# Patient Record
Sex: FEMALE | Race: WHITE | NOT HISPANIC OR LATINO | Employment: UNEMPLOYED | URBAN - NONMETROPOLITAN AREA
[De-identification: names, ages, dates, MRNs, and addresses within clinical notes are randomized per-mention and may not be internally consistent; named-entity substitution may affect disease eponyms.]

---

## 2018-05-24 ENCOUNTER — TRANSCRIBE ORDERS (OUTPATIENT)
Dept: ADMINISTRATIVE | Facility: HOSPITAL | Age: 73
End: 2018-05-24

## 2018-05-24 DIAGNOSIS — R80.9 PROTEINURIA, UNSPECIFIED TYPE: ICD-10-CM

## 2018-05-24 DIAGNOSIS — R06.09 DYSPNEA ON EXERTION: Primary | ICD-10-CM

## 2019-10-02 ENCOUNTER — APPOINTMENT (OUTPATIENT)
Dept: GENERAL RADIOLOGY | Facility: HOSPITAL | Age: 74
End: 2019-10-02

## 2019-10-02 ENCOUNTER — HOSPITAL ENCOUNTER (INPATIENT)
Facility: HOSPITAL | Age: 74
LOS: 9 days | Discharge: HOME | End: 2019-10-11
Attending: FAMILY MEDICINE | Admitting: INTERNAL MEDICINE

## 2019-10-02 ENCOUNTER — APPOINTMENT (OUTPATIENT)
Dept: CT IMAGING | Facility: HOSPITAL | Age: 74
End: 2019-10-02

## 2019-10-02 DIAGNOSIS — R00.1 SYMPTOMATIC BRADYCARDIA: Primary | ICD-10-CM

## 2019-10-02 DIAGNOSIS — R55 SYNCOPE, UNSPECIFIED SYNCOPE TYPE: ICD-10-CM

## 2019-10-02 DIAGNOSIS — R53.1 WEAKNESS: ICD-10-CM

## 2019-10-02 PROBLEM — E83.42 HYPOMAGNESEMIA: Status: ACTIVE | Noted: 2019-10-02

## 2019-10-02 LAB
ALBUMIN SERPL-MCNC: 4 G/DL (ref 3.5–5.2)
ALBUMIN/GLOB SERPL: 1.5 G/DL
ALP SERPL-CCNC: 59 U/L (ref 39–117)
ALT SERPL W P-5'-P-CCNC: 6 U/L (ref 1–33)
ANION GAP SERPL CALCULATED.3IONS-SCNC: 14.1 MMOL/L (ref 5–15)
AST SERPL-CCNC: 10 U/L (ref 1–32)
BASOPHILS # BLD AUTO: 0.02 10*3/MM3 (ref 0–0.2)
BASOPHILS NFR BLD AUTO: 0.2 % (ref 0–1.5)
BILIRUB SERPL-MCNC: 0.3 MG/DL (ref 0.2–1.2)
BUN BLD-MCNC: 11 MG/DL (ref 8–23)
BUN/CREAT SERPL: 13.1 (ref 7–25)
CALCIUM SPEC-SCNC: 9.5 MG/DL (ref 8.6–10.5)
CHLORIDE SERPL-SCNC: 97 MMOL/L (ref 98–107)
CO2 SERPL-SCNC: 24.9 MMOL/L (ref 22–29)
CREAT BLD-MCNC: 0.84 MG/DL (ref 0.57–1)
DEPRECATED RDW RBC AUTO: 43.1 FL (ref 37–54)
EOSINOPHIL # BLD AUTO: 0.09 10*3/MM3 (ref 0–0.4)
EOSINOPHIL NFR BLD AUTO: 1 % (ref 0.3–6.2)
ERYTHROCYTE [DISTWIDTH] IN BLOOD BY AUTOMATED COUNT: 14.1 % (ref 12.3–15.4)
GFR SERPL CREATININE-BSD FRML MDRD: 66 ML/MIN/1.73
GLOBULIN UR ELPH-MCNC: 2.7 GM/DL
GLUCOSE BLD-MCNC: 138 MG/DL (ref 65–99)
GLUCOSE BLDC GLUCOMTR-MCNC: 132 MG/DL (ref 70–130)
HBA1C MFR BLD: 5.9 % (ref 4.8–5.6)
HCT VFR BLD AUTO: 38.1 % (ref 34–46.6)
HGB BLD-MCNC: 12.2 G/DL (ref 12–15.9)
HOLD SPECIMEN: NORMAL
HOLD SPECIMEN: NORMAL
IMM GRANULOCYTES # BLD AUTO: 0.01 10*3/MM3 (ref 0–0.05)
IMM GRANULOCYTES NFR BLD AUTO: 0.1 % (ref 0–0.5)
LYMPHOCYTES # BLD AUTO: 2.58 10*3/MM3 (ref 0.7–3.1)
LYMPHOCYTES NFR BLD AUTO: 27.3 % (ref 19.6–45.3)
MAGNESIUM SERPL-MCNC: 1.4 MG/DL (ref 1.6–2.4)
MCH RBC QN AUTO: 27.2 PG (ref 26.6–33)
MCHC RBC AUTO-ENTMCNC: 32 G/DL (ref 31.5–35.7)
MCV RBC AUTO: 84.9 FL (ref 79–97)
MONOCYTES # BLD AUTO: 0.63 10*3/MM3 (ref 0.1–0.9)
MONOCYTES NFR BLD AUTO: 6.7 % (ref 5–12)
NEUTROPHILS # BLD AUTO: 6.13 10*3/MM3 (ref 1.7–7)
NEUTROPHILS NFR BLD AUTO: 64.7 % (ref 42.7–76)
PLATELET # BLD AUTO: 234 10*3/MM3 (ref 140–450)
PMV BLD AUTO: 10.3 FL (ref 6–12)
POTASSIUM BLD-SCNC: 3.9 MMOL/L (ref 3.5–5.2)
PROT SERPL-MCNC: 6.7 G/DL (ref 6–8.5)
RBC # BLD AUTO: 4.49 10*6/MM3 (ref 3.77–5.28)
SODIUM BLD-SCNC: 136 MMOL/L (ref 136–145)
TROPONIN T SERPL-MCNC: <0.01 NG/ML (ref 0–0.03)
TROPONIN T SERPL-MCNC: <0.01 NG/ML (ref 0–0.03)
TSH SERPL DL<=0.05 MIU/L-ACNC: 1.7 UIU/ML (ref 0.27–4.2)
WBC NRBC COR # BLD: 9.46 10*3/MM3 (ref 3.4–10.8)
WHOLE BLOOD HOLD SPECIMEN: NORMAL
WHOLE BLOOD HOLD SPECIMEN: NORMAL

## 2019-10-02 PROCEDURE — 36415 COLL VENOUS BLD VENIPUNCTURE: CPT

## 2019-10-02 PROCEDURE — 83036 HEMOGLOBIN GLYCOSYLATED A1C: CPT | Performed by: PHYSICIAN ASSISTANT

## 2019-10-02 PROCEDURE — 70450 CT HEAD/BRAIN W/O DYE: CPT | Performed by: RADIOLOGY

## 2019-10-02 PROCEDURE — 25010000002 HYDRALAZINE PER 20 MG: Performed by: PHYSICIAN ASSISTANT

## 2019-10-02 PROCEDURE — 25010000002 HEPARIN (PORCINE) PER 1000 UNITS: Performed by: HOSPITALIST

## 2019-10-02 PROCEDURE — 84484 ASSAY OF TROPONIN QUANT: CPT | Performed by: FAMILY MEDICINE

## 2019-10-02 PROCEDURE — 82962 GLUCOSE BLOOD TEST: CPT

## 2019-10-02 PROCEDURE — 99223 1ST HOSP IP/OBS HIGH 75: CPT | Performed by: PHYSICIAN ASSISTANT

## 2019-10-02 PROCEDURE — 84443 ASSAY THYROID STIM HORMONE: CPT | Performed by: HOSPITALIST

## 2019-10-02 PROCEDURE — 80053 COMPREHEN METABOLIC PANEL: CPT | Performed by: FAMILY MEDICINE

## 2019-10-02 PROCEDURE — 93005 ELECTROCARDIOGRAM TRACING: CPT | Performed by: FAMILY MEDICINE

## 2019-10-02 PROCEDURE — 85025 COMPLETE CBC W/AUTO DIFF WBC: CPT | Performed by: FAMILY MEDICINE

## 2019-10-02 PROCEDURE — 71045 X-RAY EXAM CHEST 1 VIEW: CPT

## 2019-10-02 PROCEDURE — 99285 EMERGENCY DEPT VISIT HI MDM: CPT

## 2019-10-02 PROCEDURE — 70450 CT HEAD/BRAIN W/O DYE: CPT

## 2019-10-02 PROCEDURE — 94799 UNLISTED PULMONARY SVC/PX: CPT

## 2019-10-02 PROCEDURE — 25010000002 MAGNESIUM SULFATE IN D5W 1G/100ML (PREMIX) 1-5 GM/100ML-% SOLUTION: Performed by: HOSPITALIST

## 2019-10-02 PROCEDURE — 71045 X-RAY EXAM CHEST 1 VIEW: CPT | Performed by: RADIOLOGY

## 2019-10-02 PROCEDURE — 84484 ASSAY OF TROPONIN QUANT: CPT | Performed by: HOSPITALIST

## 2019-10-02 PROCEDURE — 83735 ASSAY OF MAGNESIUM: CPT | Performed by: FAMILY MEDICINE

## 2019-10-02 RX ORDER — MAGNESIUM SULFATE 1 G/100ML
1 INJECTION INTRAVENOUS AS NEEDED
Status: DISCONTINUED | OUTPATIENT
Start: 2019-10-02 | End: 2019-10-11 | Stop reason: HOSPADM

## 2019-10-02 RX ORDER — TRAZODONE HYDROCHLORIDE 50 MG/1
50 TABLET ORAL NIGHTLY
Status: ON HOLD | COMMUNITY
End: 2019-10-11 | Stop reason: SDUPTHER

## 2019-10-02 RX ORDER — METOPROLOL TARTRATE 50 MG/1
50 TABLET, FILM COATED ORAL 2 TIMES DAILY
Status: CANCELLED | OUTPATIENT
Start: 2019-10-02

## 2019-10-02 RX ORDER — NICOTINE POLACRILEX 4 MG
15 LOZENGE BUCCAL
Status: DISCONTINUED | OUTPATIENT
Start: 2019-10-02 | End: 2019-10-11 | Stop reason: HOSPADM

## 2019-10-02 RX ORDER — SODIUM CHLORIDE 9 MG/ML
75 INJECTION, SOLUTION INTRAVENOUS CONTINUOUS
Status: DISCONTINUED | OUTPATIENT
Start: 2019-10-02 | End: 2019-10-07

## 2019-10-02 RX ORDER — AMLODIPINE BESYLATE 10 MG/1
10 TABLET ORAL DAILY
Status: ON HOLD | COMMUNITY
End: 2019-10-11 | Stop reason: SDUPTHER

## 2019-10-02 RX ORDER — DEXTROSE MONOHYDRATE 25 G/50ML
25 INJECTION, SOLUTION INTRAVENOUS
Status: DISCONTINUED | OUTPATIENT
Start: 2019-10-02 | End: 2019-10-11 | Stop reason: HOSPADM

## 2019-10-02 RX ORDER — SODIUM CHLORIDE 0.9 % (FLUSH) 0.9 %
10 SYRINGE (ML) INJECTION EVERY 12 HOURS SCHEDULED
Status: DISCONTINUED | OUTPATIENT
Start: 2019-10-02 | End: 2019-10-11 | Stop reason: HOSPADM

## 2019-10-02 RX ORDER — HEPARIN SODIUM 5000 [USP'U]/ML
5000 INJECTION, SOLUTION INTRAVENOUS; SUBCUTANEOUS EVERY 12 HOURS SCHEDULED
Status: DISCONTINUED | OUTPATIENT
Start: 2019-10-02 | End: 2019-10-11 | Stop reason: HOSPADM

## 2019-10-02 RX ORDER — AMLODIPINE BESYLATE 10 MG/1
10 TABLET ORAL DAILY
Status: CANCELLED | OUTPATIENT
Start: 2019-10-03

## 2019-10-02 RX ORDER — ASPIRIN 81 MG/1
81 TABLET ORAL DAILY
Status: CANCELLED | OUTPATIENT
Start: 2019-10-03

## 2019-10-02 RX ORDER — CLONIDINE HYDROCHLORIDE 0.1 MG/1
0.3 TABLET ORAL 3 TIMES DAILY PRN
Status: CANCELLED | OUTPATIENT
Start: 2019-10-02

## 2019-10-02 RX ORDER — ASPIRIN 81 MG/1
81 TABLET ORAL DAILY
Status: ON HOLD | COMMUNITY
End: 2019-10-11 | Stop reason: SDUPTHER

## 2019-10-02 RX ORDER — MAGNESIUM SULFATE 1 G/100ML
1 INJECTION INTRAVENOUS
Status: DISPENSED | OUTPATIENT
Start: 2019-10-02 | End: 2019-10-02

## 2019-10-02 RX ORDER — BUSPIRONE HYDROCHLORIDE 5 MG/1
5 TABLET ORAL 2 TIMES DAILY
Status: ON HOLD | COMMUNITY
End: 2019-10-11 | Stop reason: SDUPTHER

## 2019-10-02 RX ORDER — SODIUM CHLORIDE 0.9 % (FLUSH) 0.9 %
10 SYRINGE (ML) INJECTION AS NEEDED
Status: DISCONTINUED | OUTPATIENT
Start: 2019-10-02 | End: 2019-10-11 | Stop reason: HOSPADM

## 2019-10-02 RX ORDER — HYDRALAZINE HYDROCHLORIDE 20 MG/ML
10 INJECTION INTRAMUSCULAR; INTRAVENOUS EVERY 6 HOURS PRN
Status: DISCONTINUED | OUTPATIENT
Start: 2019-10-02 | End: 2019-10-10

## 2019-10-02 RX ORDER — PAROXETINE HYDROCHLORIDE 20 MG/1
20 TABLET, FILM COATED ORAL EVERY MORNING
Status: ON HOLD | COMMUNITY
End: 2019-10-11 | Stop reason: SDUPTHER

## 2019-10-02 RX ORDER — HYDRALAZINE HYDROCHLORIDE 20 MG/ML
10 INJECTION INTRAMUSCULAR; INTRAVENOUS ONCE
Status: DISCONTINUED | OUTPATIENT
Start: 2019-10-02 | End: 2019-10-02

## 2019-10-02 RX ORDER — MAGNESIUM SULFATE HEPTAHYDRATE 40 MG/ML
4 INJECTION, SOLUTION INTRAVENOUS AS NEEDED
Status: DISCONTINUED | OUTPATIENT
Start: 2019-10-02 | End: 2019-10-11 | Stop reason: HOSPADM

## 2019-10-02 RX ORDER — METOPROLOL TARTRATE 50 MG/1
50 TABLET, FILM COATED ORAL 2 TIMES DAILY
COMMUNITY
End: 2019-10-11 | Stop reason: HOSPADM

## 2019-10-02 RX ORDER — MAGNESIUM SULFATE HEPTAHYDRATE 40 MG/ML
2 INJECTION, SOLUTION INTRAVENOUS AS NEEDED
Status: DISCONTINUED | OUTPATIENT
Start: 2019-10-02 | End: 2019-10-11 | Stop reason: HOSPADM

## 2019-10-02 RX ORDER — CLONIDINE HYDROCHLORIDE 0.3 MG/1
0.3 TABLET ORAL 3 TIMES DAILY PRN
COMMUNITY
End: 2019-10-11 | Stop reason: HOSPADM

## 2019-10-02 RX ADMIN — HYDRALAZINE HYDROCHLORIDE 10 MG: 20 INJECTION INTRAMUSCULAR; INTRAVENOUS at 21:30

## 2019-10-02 RX ADMIN — MAGNESIUM SULFATE IN DEXTROSE 1 G: 10 INJECTION, SOLUTION INTRAVENOUS at 23:59

## 2019-10-02 RX ADMIN — SODIUM CHLORIDE 75 ML/HR: 9 INJECTION, SOLUTION INTRAVENOUS at 19:56

## 2019-10-02 RX ADMIN — HEPARIN SODIUM 5000 UNITS: 5000 INJECTION INTRAVENOUS; SUBCUTANEOUS at 22:52

## 2019-10-02 RX ADMIN — MAGNESIUM GLUCONATE 500 MG ORAL TABLET 400 MG: 500 TABLET ORAL at 18:42

## 2019-10-02 RX ADMIN — MAGNESIUM SULFATE IN DEXTROSE 1 G: 10 INJECTION, SOLUTION INTRAVENOUS at 22:52

## 2019-10-02 RX ADMIN — MAGNESIUM SULFATE IN DEXTROSE 1 G: 10 INJECTION, SOLUTION INTRAVENOUS at 19:56

## 2019-10-02 NOTE — ED NOTES
Patient has been dizzy today with a history of High blood pressure. Patient gets dizzy and weak if she sits upright. Patient on monitor and laying in bed with blankets applied. Patient has even and unlabored respirations. POC agreed to by family and no questions at this time.     Jose Fatima, RN  10/02/19 [id number]

## 2019-10-02 NOTE — ED PROVIDER NOTES
Subjective   74-year-old female with a history of hypertension diabetes presents the emergency room complaints of weakness and dizziness for 1 year.  Patient states she had intermittent episodes of dizziness.  She states symptoms come and go in the room.  She states that there are no exacerbating factors but she does have relieving factors with laying flat.  She states that her last episode occurred today.  She states that dizziness is described as being off balance and weak.  She denies focal weakness or numbness.  She denies chest pain but does report heart palpitations when dizziness occurs.  She states she went to her family doctor today for routine evaluation and was noted to be bradycardic she therefore instructed to come to the emergency room in light of her symptoms.        Dizziness   Quality:  Lightheadedness and imbalance  Severity:  Moderate  Duration:  12 months  Timing:  Intermittent  Progression:  Waxing and waning  Chronicity:  Recurrent  Relieved by:  Lying down  Worsened by:  Nothing  Associated symptoms: nausea, palpitations and weakness    Associated symptoms: no blood in stool, no chest pain, no diarrhea, no headaches, no hearing loss, no shortness of breath and no vomiting        Review of Systems   Constitutional: Negative for chills, fatigue and fever.   HENT: Negative for hearing loss.    Respiratory: Negative for cough, shortness of breath and wheezing.    Cardiovascular: Positive for palpitations. Negative for chest pain.   Gastrointestinal: Positive for nausea. Negative for blood in stool, diarrhea and vomiting.   Genitourinary: Negative for flank pain.   Neurological: Positive for dizziness and weakness. Negative for headaches.   All other systems reviewed and are negative.      No past medical history on file.    No Known Allergies    No past surgical history on file.    No family history on file.    Social History     Socioeconomic History   • Marital status: Unknown     Spouse name:  Not on file   • Number of children: Not on file   • Years of education: Not on file   • Highest education level: Not on file           Objective   Physical Exam   Constitutional: She is oriented to person, place, and time. No distress.   HENT:   Head: Normocephalic and atraumatic.   Mouth/Throat: Oropharynx is clear and moist.   Eyes: Conjunctivae and EOM are normal. Pupils are equal, round, and reactive to light.   Neck: Neck supple. No JVD present.   No carotid bruit.  No thyromegaly.   Cardiovascular:   No murmur heard.  Bradycardic.  2+ radial pulses.  2+ dorsalis pedis pulses   Pulmonary/Chest: Effort normal and breath sounds normal. She has no wheezes. She has no rales.   No accessory muscle use.  Symmetric chest wall movement.   Abdominal: Soft. Bowel sounds are normal. There is no tenderness. There is no rebound and no guarding.   No abdominal bruit   Musculoskeletal: She exhibits no edema.   Neurological: She is alert and oriented to person, place, and time.   No nystagmus.  No dysarthria.  Symmetric sensation light touch proximal and distal.   Skin: Skin is warm. Capillary refill takes less than 2 seconds.   Psychiatric: She has a normal mood and affect.   Nursing note and vitals reviewed.      Procedures           ED Course  ED Course as of Oct 02 1943   Wed Oct 02, 2019   1753 Patient is noted to have low magnesium 1.4.  Have ordered oral replacement.  [BB]   1827 Patient neurovascular intact.  Awaiting CT scan of head.  We will continue to monitor patient.  [BB]   1921 Patient currently stable.  Patient CT scan of head is unremarkable.  Have spoken to hospitalist who is agreeable to the patient  [BB]   1933 EKG shows ST depression inferior lateral leads.  [BB]   1942 Have spoken to Dr. Ac and made aware of findings  [BB]      ED Course User Index  [BB] Yoandy Tong MD                  MDM  Number of Diagnoses or Management Options  Symptomatic bradycardia: new and requires workup     Amount  and/or Complexity of Data Reviewed  Clinical lab tests: reviewed and ordered  Tests in the radiology section of CPT®: reviewed and ordered  Discuss the patient with other providers: yes    Risk of Complications, Morbidity, and/or Mortality  Presenting problems: moderate  Diagnostic procedures: moderate  Management options: moderate        Final diagnoses:   None              Yoandy Tong MD  10/02/19 1923       Yoandy Tong MD  10/02/19 1943

## 2019-10-02 NOTE — ED NOTES
Patient resting on stretcher, family member in room with patient. Patient updated regarding wait times and POC. Patient verbalizes understanding and agreeable to POC. No further concerns voiced at this time. Patient feels less dizzy as long as she is not sitting upright. Will continue to monitor.      Jose Fatima, RN  10/02/19 [id number]

## 2019-10-03 ENCOUNTER — APPOINTMENT (OUTPATIENT)
Dept: ULTRASOUND IMAGING | Facility: HOSPITAL | Age: 74
End: 2019-10-03

## 2019-10-03 LAB
ALBUMIN SERPL-MCNC: 3.78 G/DL (ref 3.5–5.2)
ALBUMIN/GLOB SERPL: 1.3 G/DL
ALP SERPL-CCNC: 62 U/L (ref 39–117)
ALT SERPL W P-5'-P-CCNC: 6 U/L (ref 1–33)
ANION GAP SERPL CALCULATED.3IONS-SCNC: 13.6 MMOL/L (ref 5–15)
AST SERPL-CCNC: 9 U/L (ref 1–32)
BASOPHILS # BLD AUTO: 0.02 10*3/MM3 (ref 0–0.2)
BASOPHILS NFR BLD AUTO: 0.2 % (ref 0–1.5)
BILIRUB SERPL-MCNC: 0.2 MG/DL (ref 0.2–1.2)
BUN BLD-MCNC: 12 MG/DL (ref 8–23)
BUN/CREAT SERPL: 16 (ref 7–25)
CALCIUM SPEC-SCNC: 9.4 MG/DL (ref 8.6–10.5)
CHLORIDE SERPL-SCNC: 96 MMOL/L (ref 98–107)
CO2 SERPL-SCNC: 26.4 MMOL/L (ref 22–29)
CREAT BLD-MCNC: 0.75 MG/DL (ref 0.57–1)
D DIMER PPP FEU-MCNC: 0.44 MCGFEU/ML (ref 0–0.5)
DEPRECATED RDW RBC AUTO: 42.2 FL (ref 37–54)
EOSINOPHIL # BLD AUTO: 0.16 10*3/MM3 (ref 0–0.4)
EOSINOPHIL NFR BLD AUTO: 1.9 % (ref 0.3–6.2)
ERYTHROCYTE [DISTWIDTH] IN BLOOD BY AUTOMATED COUNT: 13.9 % (ref 12.3–15.4)
GFR SERPL CREATININE-BSD FRML MDRD: 76 ML/MIN/1.73
GLOBULIN UR ELPH-MCNC: 2.8 GM/DL
GLUCOSE BLD-MCNC: 131 MG/DL (ref 65–99)
GLUCOSE BLDC GLUCOMTR-MCNC: 106 MG/DL (ref 70–130)
GLUCOSE BLDC GLUCOMTR-MCNC: 107 MG/DL (ref 70–130)
GLUCOSE BLDC GLUCOMTR-MCNC: 121 MG/DL (ref 70–130)
GLUCOSE BLDC GLUCOMTR-MCNC: 141 MG/DL (ref 70–130)
HCT VFR BLD AUTO: 36.3 % (ref 34–46.6)
HGB BLD-MCNC: 11.7 G/DL (ref 12–15.9)
IMM GRANULOCYTES # BLD AUTO: 0.01 10*3/MM3 (ref 0–0.05)
IMM GRANULOCYTES NFR BLD AUTO: 0.1 % (ref 0–0.5)
LYMPHOCYTES # BLD AUTO: 3.11 10*3/MM3 (ref 0.7–3.1)
LYMPHOCYTES NFR BLD AUTO: 36.8 % (ref 19.6–45.3)
MAGNESIUM SERPL-MCNC: 2 MG/DL (ref 1.6–2.4)
MCH RBC QN AUTO: 27.2 PG (ref 26.6–33)
MCHC RBC AUTO-ENTMCNC: 32.2 G/DL (ref 31.5–35.7)
MCV RBC AUTO: 84.4 FL (ref 79–97)
MONOCYTES # BLD AUTO: 0.81 10*3/MM3 (ref 0.1–0.9)
MONOCYTES NFR BLD AUTO: 9.6 % (ref 5–12)
NEUTROPHILS # BLD AUTO: 4.34 10*3/MM3 (ref 1.7–7)
NEUTROPHILS NFR BLD AUTO: 51.4 % (ref 42.7–76)
PLATELET # BLD AUTO: 230 10*3/MM3 (ref 140–450)
PMV BLD AUTO: 10.4 FL (ref 6–12)
POTASSIUM BLD-SCNC: 3.5 MMOL/L (ref 3.5–5.2)
PROT SERPL-MCNC: 6.6 G/DL (ref 6–8.5)
RBC # BLD AUTO: 4.3 10*6/MM3 (ref 3.77–5.28)
SODIUM BLD-SCNC: 136 MMOL/L (ref 136–145)
TROPONIN T SERPL-MCNC: <0.01 NG/ML (ref 0–0.03)
WBC NRBC COR # BLD: 8.45 10*3/MM3 (ref 3.4–10.8)

## 2019-10-03 PROCEDURE — 93880 EXTRACRANIAL BILAT STUDY: CPT

## 2019-10-03 PROCEDURE — 80053 COMPREHEN METABOLIC PANEL: CPT | Performed by: HOSPITALIST

## 2019-10-03 PROCEDURE — 94799 UNLISTED PULMONARY SVC/PX: CPT

## 2019-10-03 PROCEDURE — 25010000002 HYDRALAZINE PER 20 MG: Performed by: PHYSICIAN ASSISTANT

## 2019-10-03 PROCEDURE — 90674 CCIIV4 VAC NO PRSV 0.5 ML IM: CPT | Performed by: HOSPITALIST

## 2019-10-03 PROCEDURE — 82962 GLUCOSE BLOOD TEST: CPT

## 2019-10-03 PROCEDURE — G0008 ADMIN INFLUENZA VIRUS VAC: HCPCS | Performed by: HOSPITALIST

## 2019-10-03 PROCEDURE — 85379 FIBRIN DEGRADATION QUANT: CPT | Performed by: PHYSICIAN ASSISTANT

## 2019-10-03 PROCEDURE — 85025 COMPLETE CBC W/AUTO DIFF WBC: CPT | Performed by: HOSPITALIST

## 2019-10-03 PROCEDURE — 83735 ASSAY OF MAGNESIUM: CPT | Performed by: HOSPITALIST

## 2019-10-03 PROCEDURE — 84484 ASSAY OF TROPONIN QUANT: CPT | Performed by: HOSPITALIST

## 2019-10-03 PROCEDURE — 25010000002 HEPARIN (PORCINE) PER 1000 UNITS: Performed by: HOSPITALIST

## 2019-10-03 PROCEDURE — 93880 EXTRACRANIAL BILAT STUDY: CPT | Performed by: RADIOLOGY

## 2019-10-03 PROCEDURE — 99232 SBSQ HOSP IP/OBS MODERATE 35: CPT | Performed by: FAMILY MEDICINE

## 2019-10-03 PROCEDURE — 25010000002 INFLUENZA VAC SUBUNIT QUAD 0.5 ML SUSPENSION PREFILLED SYRINGE: Performed by: HOSPITALIST

## 2019-10-03 RX ORDER — PAROXETINE HYDROCHLORIDE 20 MG/1
20 TABLET, FILM COATED ORAL DAILY
Status: DISCONTINUED | OUTPATIENT
Start: 2019-10-03 | End: 2019-10-11 | Stop reason: HOSPADM

## 2019-10-03 RX ORDER — ATORVASTATIN CALCIUM 20 MG/1
20 TABLET, FILM COATED ORAL NIGHTLY
Status: DISCONTINUED | OUTPATIENT
Start: 2019-10-03 | End: 2019-10-11 | Stop reason: HOSPADM

## 2019-10-03 RX ORDER — MIDODRINE HYDROCHLORIDE 2.5 MG/1
2.5 TABLET ORAL
Status: DISCONTINUED | OUTPATIENT
Start: 2019-10-03 | End: 2019-10-05

## 2019-10-03 RX ORDER — AMLODIPINE BESYLATE 5 MG/1
2.5 TABLET ORAL
Status: DISCONTINUED | OUTPATIENT
Start: 2019-10-04 | End: 2019-10-06

## 2019-10-03 RX ORDER — BUSPIRONE HYDROCHLORIDE 5 MG/1
5 TABLET ORAL 2 TIMES DAILY
Status: DISCONTINUED | OUTPATIENT
Start: 2019-10-03 | End: 2019-10-11 | Stop reason: HOSPADM

## 2019-10-03 RX ORDER — AMLODIPINE BESYLATE 10 MG/1
10 TABLET ORAL
Status: DISCONTINUED | OUTPATIENT
Start: 2019-10-03 | End: 2019-10-03

## 2019-10-03 RX ORDER — ASPIRIN 81 MG/1
81 TABLET ORAL DAILY
Status: DISCONTINUED | OUTPATIENT
Start: 2019-10-03 | End: 2019-10-11 | Stop reason: HOSPADM

## 2019-10-03 RX ORDER — MIDODRINE HYDROCHLORIDE 2.5 MG/1
5 TABLET ORAL
Status: DISCONTINUED | OUTPATIENT
Start: 2019-10-03 | End: 2019-10-03

## 2019-10-03 RX ORDER — LOSARTAN POTASSIUM 50 MG/1
50 TABLET ORAL
Status: DISCONTINUED | OUTPATIENT
Start: 2019-10-03 | End: 2019-10-04

## 2019-10-03 RX ORDER — TRAZODONE HYDROCHLORIDE 50 MG/1
50 TABLET ORAL NIGHTLY
Status: DISCONTINUED | OUTPATIENT
Start: 2019-10-03 | End: 2019-10-11 | Stop reason: HOSPADM

## 2019-10-03 RX ADMIN — AMLODIPINE BESYLATE 10 MG: 10 TABLET ORAL at 06:03

## 2019-10-03 RX ADMIN — MIDODRINE HYDROCHLORIDE 2.5 MG: 2.5 TABLET ORAL at 17:35

## 2019-10-03 RX ADMIN — BUSPIRONE HYDROCHLORIDE 5 MG: 5 TABLET ORAL at 20:59

## 2019-10-03 RX ADMIN — HEPARIN SODIUM 5000 UNITS: 5000 INJECTION INTRAVENOUS; SUBCUTANEOUS at 20:59

## 2019-10-03 RX ADMIN — HEPARIN SODIUM 5000 UNITS: 5000 INJECTION INTRAVENOUS; SUBCUTANEOUS at 08:20

## 2019-10-03 RX ADMIN — SODIUM CHLORIDE, PRESERVATIVE FREE 10 ML: 5 INJECTION INTRAVENOUS at 20:59

## 2019-10-03 RX ADMIN — ASPIRIN 81 MG: 81 TABLET, COATED ORAL at 11:57

## 2019-10-03 RX ADMIN — LOSARTAN POTASSIUM 50 MG: 50 TABLET ORAL at 12:13

## 2019-10-03 RX ADMIN — MIDODRINE HYDROCHLORIDE 2.5 MG: 2.5 TABLET ORAL at 11:57

## 2019-10-03 RX ADMIN — TRAZODONE HYDROCHLORIDE 50 MG: 50 TABLET ORAL at 20:59

## 2019-10-03 RX ADMIN — METFORMIN HYDROCHLORIDE 1000 MG: 500 TABLET ORAL at 17:35

## 2019-10-03 RX ADMIN — PAROXETINE HYDROCHLORIDE 20 MG: 20 TABLET, FILM COATED ORAL at 17:35

## 2019-10-03 RX ADMIN — HYDRALAZINE HYDROCHLORIDE 10 MG: 20 INJECTION INTRAMUSCULAR; INTRAVENOUS at 18:48

## 2019-10-03 RX ADMIN — INFLUENZA A VIRUS A/IDAHO/07/2018 (H1N1) ANTIGEN (MDCK CELL DERIVED, PROPIOLACTONE INACTIVATED, INFLUENZA A VIRUS A/INDIANA/08/2018 (H3N2) ANTIGEN (MDCK CELL DERIVED, PROPIOLACTONE INACTIVATED), INFLUENZA B VIRUS B/SINGAPORE/INFTT-16-0610/2016 ANTIGEN (MDCK CELL DERIVED, PROPIOLACTONE INACTIVATED), INFLUENZA B VIRUS B/IOWA/06/2017 ANTIGEN (MDCK CELL DERIVED, PROPIOLACTONE INACTIVATED) 0.5 ML: 15; 15; 15; 15 INJECTION, SUSPENSION INTRAMUSCULAR at 11:58

## 2019-10-03 RX ADMIN — ATORVASTATIN CALCIUM 20 MG: 20 TABLET, FILM COATED ORAL at 20:59

## 2019-10-03 RX ADMIN — HYDRALAZINE HYDROCHLORIDE 10 MG: 20 INJECTION INTRAMUSCULAR; INTRAVENOUS at 03:55

## 2019-10-03 RX ADMIN — MAGNESIUM GLUCONATE 500 MG ORAL TABLET 400 MG: 500 TABLET ORAL at 08:20

## 2019-10-03 RX ADMIN — SODIUM CHLORIDE, PRESERVATIVE FREE 10 ML: 5 INJECTION INTRAVENOUS at 08:22

## 2019-10-03 NOTE — PROGRESS NOTES
[facility] HOSPITALIST    PROGRESS NOTE    Name:  Mariluz Jenkins   Age:  74 y.o.  Sex:  female  :  1945  MRN:  [id number]   Visit Number:  [id number]  Admission Date:  10/2/2019  Date Of Service:  10/03/19  Primary Care Physician:  Delfin Mccarty APRN     LOS: 1 day :  Patient Care Team:  Delfin Mccarty APRN as PCP - General (Nurse Practitioner):    Chief Complaint:      Near sycope    Subjective / Interval History:     Patient seen and examined at bedside  No adverse events overnight  Reports improvement in alertness      Review of Systems:     General ROS: Patient denies any fevers, chills or loss of consciousness.  Respiratory ROS: Denies cough or shortness of breath.  Cardiovascular ROS: Denies chest pain or palpitations. No history of exertional chest pain.  Gastrointestinal ROS: Denies nausea and vomiting. Denies any abdominal pain. No diarrhea.  Neurological ROS: Denies any focal weakness. No loss of consciousness. Denies any numbness. Denies neck pain.  Dermatological ROS: Denies any redness or pruritis.    Vital Signs:    Temp:  [98 °F (36.7 °C)-98.3 °F (36.8 °C)] 98.3 °F (36.8 °C)  Heart Rate:  [52-67] 62  Resp:  [18] 18  BP: (123-206)/(45-96) 157/72    Intake and output:    I/O last 3 completed shifts:  In: 240 [P.O.:240]  Out: 2500 [Urine:2500]  I/O this shift:  In: -   Out: 1300 [Urine:1300]    Physical Examination:    General Appearance:  Alert and cooperative, not in any acute distress.   Head:  Atraumatic and normocephalic, without obvious abnormality.   Eyes:          PERRLA, conjunctivae and sclerae normal, no Icterus. No pallor. Extra-occular movements are within normal limits.   Neck: Supple, trachea midline, no thyromegaly, no carotid bruit.   Lungs:   Chest shape is normal. Breath sounds heard bilaterally equally.  No crackles or wheezing. No Pleural rub or bronchial breathing.   Heart:  Normal S1 and S2, no murmur, no gallop, no rub. No JVD   Abdomen:    Normal bowel sounds, no masses, no organomegaly. Soft       non-tender, non-distended, no guarding, no rebound tenderness.   Extremities: Moves all extremities well, no edema, no cyanosis, no            clubbing.   Skin: No bleeding, bruising or rash.   Neurologic: Awake, alert and oriented times 3. Moves all 4 extremities equally.   Laboratory results:    Results from last 7 days   Lab Units 10/03/19  0128 10/02/19  1651   SODIUM mmol/L 136 136   POTASSIUM mmol/L 3.5 3.9   CHLORIDE mmol/L 96* 97*   CO2 mmol/L 26.4 24.9   BUN mg/dL 12 11   CREATININE mg/dL 0.75 0.84   CALCIUM mg/dL 9.4 9.5   BILIRUBIN mg/dL 0.2 0.3   ALK PHOS U/L 62 59   ALT (SGPT) U/L 6 6   AST (SGOT) U/L 9 10   GLUCOSE mg/dL 131* 138*     Results from last 7 days   Lab Units 10/03/19  0128 10/02/19  1651   WBC 10*3/mm3 8.45 9.46   HEMOGLOBIN g/dL 11.7* 12.2   HEMATOCRIT % 36.3 38.1   PLATELETS 10*3/mm3 230 234         Results from last 7 days   Lab Units 10/03/19  0128 10/02/19  2015 10/02/19  1651   TROPONIN T ng/mL <0.010 <0.010 <0.010           I have reviewed the patient's laboratory results.    Radiology results:    Imaging Results (last 24 hours)     Procedure Component Value Units Date/Time    CT Head Without Contrast [[id number]] Collected:  10/02/19 1904     Updated:  10/02/19 1907    Narrative:       EXAMINATION: CT HEAD WO CONTRAST-      CLINICAL INDICATION:     dizziness/near syncope     COMPARISON:    None     Technique: Multiple CT axial images were obtained through the level of  the brain without IV contrast administration. Reformatted images in the  coronal and/or sagittal plane(s) were generated from the axial data set  to facilitate diagnostic accuracy and/or surgical planning.     Radiation dose reduction techniques were utilized per ALARA protocol.  Automated exposure control was initiated through either or [employer]. or  DoseRight software packages by  protocol.       DOSE (DLP mGy-cm):     FINDINGS:     Brain:  Unremarkable. No parenchymal hemorrhage or mass. No white matter  abnormality. No areas of mass effect. Mild chronic small vessel ischemic  disease.  Ventricles: Unremarkable. No hydrocephalus.  Extra-axial spaces: Unremarkable. No extra-axial hemorrhage. No  extra-axial masses.  Bones: Unremarkable. No acute fracture identified.  Sinuses: Unremarkable as visualized. No air-fluid levels.  Mastoids: Right greater than left mastoid effusions.  Soft Tissues: Unremarkable.          Impression:       1. No acute intracranial abnormality.  2. Bilateral mastoid effusions.     This report was finalized on 10/2/2019 7:05 PM by Dr. Pineda Yousif MD.       XR Chest 1 View [[id number]] Collected:  10/02/19 1856     Updated:  10/02/19 1859    Narrative:       EXAMINATION: XR CHEST 1 VW-      CLINICAL INDICATION:     Weak/Dizzy/AMS triage protocol     TECHNIQUE:  XR CHEST 1 VW-      COMPARISON: NONE      FINDINGS:      Lungs are aerated.   Heart size, mediastinum, and pulmonary vascularity are unremarkable.   No pneumothorax.   No effusions.   No acute osseous findings.          Impression:       No radiographic evidence of acute cardiac or pulmonary  disease.     This report was finalized on 10/2/2019 6:57 PM by Dr. Pineda Yousif MD.             I have reviewed the patient's radiology reports.    Medication Review:     I have reviewed the patients active and prn medications.       Symptomatic bradycardia    Hypomagnesemia      Assessment:   Plan:    Dr Ac doing stress test tomorrow    1)  Symptomatic bradycardia:  Fall precautions. Orthostatics ordered.  US carotid and echocardiogram ordered.  Will hold home Metoprolol and Clonidine, as these are likely impacting bradycardia.       2)  Near syncope with prior syncopal episodes: Will hydrate and continue fall precautions.  CT head unremarkable. Cardiology consultation placed. Will monitor telemetry. D-dimer has been added. Cardiac diet added.  TSH WNL.      3)  Abnormal EKG  with T-wave inversion:  Troponin x3 q6 and telemetry monitoring ordered.  Cardiology consultation placed.      4)  Hypomagnesemia: Electrolyte replacement added per protocol.      5)  Uncontrolled hypertension, improved:  IV Hydralazine 10mg ordered for SBP>160mmHg.      6)  Diabetes mellitus type 2:  FSBG ACHS.  SSI PRN.  Hemoglobin a1c added. She is on Metformin at home.         ----------  -DVT prophylaxis: SQ Heparin  -Activity: Up with assist/Fall precautions  -Expected length of stay: INPATIENT status due to the need for care which can only be reasonably provided in an hospital setting such as aggressive/expedited ancillary services and/or consultation services        Kaiden Rausch DO  10/03/19  10:42 AM

## 2019-10-03 NOTE — PLAN OF CARE
Problem: Patient Care Overview  Goal: Plan of Care Review  Outcome: Ongoing (interventions implemented as appropriate)    Goal: Individualization and Mutuality  Outcome: Ongoing (interventions implemented as appropriate)    Goal: Discharge Needs Assessment  Outcome: Ongoing (interventions implemented as appropriate)    Goal: Interprofessional Rounds/Family Conf  Outcome: Ongoing (interventions implemented as appropriate)      Problem: Fall Risk (Adult)  Goal: Identify Related Risk Factors and Signs and Symptoms  Outcome: Ongoing (interventions implemented as appropriate)    Goal: Absence of Fall  Outcome: Ongoing (interventions implemented as appropriate)      Problem: Skin Injury Risk (Adult)  Goal: Identify Related Risk Factors and Signs and Symptoms  Outcome: Ongoing (interventions implemented as appropriate)    Goal: Skin Health and Integrity  Outcome: Ongoing (interventions implemented as appropriate)      Problem: Diabetes, Type 2 (Adult)  Goal: Signs and Symptoms of Listed Potential Problems Will be Absent, Minimized or Managed (Diabetes, Type 2)  Outcome: Ongoing (interventions implemented as appropriate)      Problem: Arrhythmia/Dysrhythmia (Symptomatic) (Adult)  Goal: Signs and Symptoms of Listed Potential Problems Will be Absent, Minimized or Managed (Arrhythmia/Dysrhythmia)  Outcome: Ongoing (interventions implemented as appropriate)

## 2019-10-03 NOTE — H&P
[facility] Medicine Services  History & Physical    Patient Identification:  Name:  Mariluz Jenkins  Age:  74 y.o.  Sex:  female  :  1945  MRN:  [id number]   Visit Number:  [id number]  Primary Care Physician:  Delfin Mccarty APRN    I have seen the patient in conjunction with Valentina Roth PA-C and I agree with the following statements:    Subjective     Chief complaint: Sent from PCP for worsening dizziness and near syncope today with recent syncope as well    History of presenting illness:      Mariluz Jenkins is a 74 y.o. female with past medical history significant for essential hypertension, diabetes mellitus, insomnia. She presented to the emergency department of [facility] on 2019 after visiting her primary care provider for regular visit.  She has been experiencing alleged dizziness that worsens when she is standing with feelings of near syncope and recent syncopal episodes that though she has not had any today.    Mrs. Jenkins reports that for greater than 6 months she has been having episodes of near syncope with ambulation and change in position.  She reports that she begins to feel dizzy and has to lie down to avoid passing out. She tells me nearly 7 months ago she did fully pass out when dizzy while ambulating and has had some falls. She denies chest pain or known CAD; however, she reports her siblings have recently received stents.   She reports she had an unremarkable stress test within the last 5 years.  She denies fever and chills. She reports she was last hospitalized over one years ago for electrolyte abnormalities    Upon arrival to the ED, vital signs were temperature 98.3 °F, pulse 52, respiration rate 18, and reported initial blood pressure 126/45 with room air oxygen saturation 98%.  Magnesium level of 1.4.  CT head without acute  abnormality.    ---------------------------------------------------------------------------------------------------------------------   Review of Systems   Constitutional: Positive for fatigue. Negative for chills and fever.   HENT: Negative for congestion and drooling.    Eyes: Negative for pain.   Respiratory: Negative for shortness of breath and wheezing.    Cardiovascular: Negative for chest pain and leg swelling.   Gastrointestinal: Negative for abdominal distention, diarrhea, nausea and vomiting.   Endocrine: Negative for cold intolerance and heat intolerance.   Genitourinary: Negative for difficulty urinating and dysuria.   Musculoskeletal: Negative for arthralgias and gait problem.   Skin: Negative for color change and rash.   Allergic/Immunologic: Negative for environmental allergies and food allergies.   Neurological: Positive for syncope and weakness. Negative for headaches.        Near syncope   Hematological: Negative for adenopathy. Does not bruise/bleed easily.   Psychiatric/Behavioral: Negative for agitation and confusion.      ---------------------------------------------------------------------------------------------------------------------   Past Medical History:   Diagnosis Date   • Diabetes (CMS/[facility])    • Hypertension      History reviewed. No pertinent surgical history.  Family History   Problem Relation Age of Onset   • Heart disease Sister      Social History     Socioeconomic History   • Marital status: Unknown     Spouse name: Not on file   • Number of children: Not on file   • Years of education: Not on file   • Highest education level: Not on file   Tobacco Use   • Smoking status: Current Every Day Smoker   Substance and Sexual Activity   • Alcohol use: No     Frequency: Never   • Drug use: No   • Sexual activity: Defer     ---------------------------------------------------------------------------------------------------------------------   Allergies:  Patient has no known  allergies.  ---------------------------------------------------------------------------------------------------------------------   Home medications:    Medications below are reported home medications pulling from within the system; at this time, these medications have not been reconciled unless otherwise specified and are in the verification process for further verifcation as current home medications.  Medications Prior to Admission   Medication Sig Dispense Refill Last Dose   • amLODIPine (NORVASC) 10 MG tablet Take 10 mg by mouth Daily.   10/2/2019 at 1000   • aspirin 81 MG EC tablet Take 81 mg by mouth Daily.   10/2/2019 at 1000   • busPIRone (BUSPAR) 5 MG tablet Take 5 mg by mouth 2 (Two) Times a Day.   10/2/2019 at 1000   • cloNIDine (CATAPRES) 0.3 MG tablet Take 0.3 mg by mouth 3 (Three) Times a Day As Needed for High Blood Pressure.   10/2/2019 at 1000   • metFORMIN (GLUCOPHAGE) 1000 MG tablet Take 1,000 mg by mouth 2 (Two) Times a Day With Meals.   10/2/2019 at 1000   • metoprolol tartrate (LOPRESSOR) 50 MG tablet Take 50 mg by mouth 2 (Two) Times a Day.   10/2/2019 at 1000   • PARoxetine (PAXIL) 20 MG tablet Take 20 mg by mouth Every Morning.   10/2/2019 at 1000   • traZODone (DESYREL) 50 MG tablet Take 50 mg by mouth Every Night.   10/1/2019 at Unknown time       Hospital Scheduled Meds:    heparin (porcine) 5,000 Units Subcutaneous Q12H   [START ON 10/3/2019] influenza vaccine 0.5 mL Intramuscular Once   insulin aspart 0-7 Units Subcutaneous 4x Daily AC & at Bedtime   magnesium oxide 400 mg Oral Daily   magnesium sulfate 1 g Intravenous Q1H   sodium chloride 10 mL Intravenous Q12H       sodium chloride 75 mL/hr Last Rate: 75 mL/hr (10/02/19 1956)       Current listed hospital scheduled medications may not yet reflect those currently placed in orders that are signed and held awaiting patient's arrival to floor.    ---------------------------------------------------------------------------------------------------------------------     Objective     Vital Signs:  Temp:  [98.3 °F (36.8 °C)] 98.3 °F (36.8 °C)  Heart Rate:  [52-67] 61  Resp:  [18] 18  BP: (123-206)/(45-96) 197/59      10/02/19  1640 10/02/19  2056   Weight: 59 kg (130 lb) 61.5 kg (135 lb 8 oz)     Body mass index is 21.87 kg/m².  ---------------------------------------------------------------------------------------------------------------------       Physical Exam   Constitutional: She is oriented to person, place, and time. Vital signs are normal.   HENT:   Head: Normocephalic and atraumatic.   Neck: Normal range of motion. Neck supple.   Cardiovascular: Normal rate, regular rhythm, S1 normal and S2 normal.   Pulmonary/Chest: Effort normal and breath sounds normal. She has no decreased breath sounds. She has no wheezes. She has no rhonchi. She has no rales.   Abdominal: Soft. Bowel sounds are normal. There is no tenderness.   Musculoskeletal:   No significant edema/erythema   Neurological: She is alert and oriented to person, place, and time.   Skin: Skin is warm and dry.   Psychiatric: Memory, affect and judgment normal.           ---------------------------------------------------------------------------------------------------------------------  EKG:                    I have personally looked at both the EKG and the telemetry strips.  ---------------------------------------------------------------------------------------------------------------------   Results from last 7 days   Lab Units 10/02/19  1651   WBC 10*3/mm3 9.46   HEMOGLOBIN g/dL 12.2   HEMATOCRIT % 38.1   MCV fL 84.9   MCHC g/dL 32.0   PLATELETS 10*3/mm3 234         Results from last 7 days   Lab Units 10/02/19  1651   SODIUM mmol/L 136   POTASSIUM mmol/L 3.9   MAGNESIUM mg/dL 1.4*   CHLORIDE mmol/L 97*   CO2 mmol/L 24.9   BUN mg/dL 11   CREATININE mg/dL 0.84   EGFR IF NONAFRICN AM mL/min/1.73  66   CALCIUM mg/dL 9.5   GLUCOSE mg/dL 138*   ALBUMIN g/dL 4.00   BILIRUBIN mg/dL 0.3   ALK PHOS U/L 59   AST (SGOT) U/L 10   ALT (SGPT) U/L 6   Estimated Creatinine Clearance: 57.9 mL/min (by C-G formula based on SCr of 0.84 mg/dL).  No results found for: AMMONIA  Results from last 7 days   Lab Units 10/02/19  2015 10/02/19  1651   TROPONIN T ng/mL <0.010 <0.010         No results found for: HGBA1C  Lab Results   Component Value Date    TSH 1.700 10/02/2019     No results found for: PREGTESTUR, PREGSERUM, HCG, HCGQUANT  Pain Management Panel     There is no flowsheet data to display.                        ---------------------------------------------------------------------------------------------------------------------  Imaging Results (last 7 days)     Procedure Component Value Units Date/Time    CT Head Without Contrast [[id number]] Collected:  10/02/19 1904     Updated:  10/02/19 1907    Narrative:       EXAMINATION: CT HEAD WO CONTRAST-      CLINICAL INDICATION:     dizziness/near syncope     COMPARISON:    None     Technique: Multiple CT axial images were obtained through the level of  the brain without IV contrast administration. Reformatted images in the  coronal and/or sagittal plane(s) were generated from the axial data set  to facilitate diagnostic accuracy and/or surgical planning.     Radiation dose reduction techniques were utilized per ALARA protocol.  Automated exposure control was initiated through either or Diversion or  Critical Media software packages by  protocol.       DOSE (DLP mGy-cm):     FINDINGS:     Brain: Unremarkable. No parenchymal hemorrhage or mass. No white matter  abnormality. No areas of mass effect. Mild chronic small vessel ischemic  disease.  Ventricles: Unremarkable. No hydrocephalus.  Extra-axial spaces: Unremarkable. No extra-axial hemorrhage. No  extra-axial masses.  Bones: Unremarkable. No acute fracture identified.  Sinuses: Unremarkable as visualized. No  air-fluid levels.  Mastoids: Right greater than left mastoid effusions.  Soft Tissues: Unremarkable.          Impression:       1. No acute intracranial abnormality.  2. Bilateral mastoid effusions.     This report was finalized on 10/2/2019 7:05 PM by Dr. Pineda Yousif MD.       XR Chest 1 View [[id number]] Collected:  10/02/19 1856     Updated:  10/02/19 1859    Narrative:       EXAMINATION: XR CHEST 1 VW-      CLINICAL INDICATION:     Weak/Dizzy/AMS triage protocol     TECHNIQUE:  XR CHEST 1 VW-      COMPARISON: NONE      FINDINGS:      Lungs are aerated.   Heart size, mediastinum, and pulmonary vascularity are unremarkable.   No pneumothorax.   No effusions.   No acute osseous findings.          Impression:       No radiographic evidence of acute cardiac or pulmonary  disease.     This report was finalized on 10/2/2019 6:57 PM by Dr. Pineda Yousif MD.               I have personally reviewed the radiology images and read the final radiology report.  ---------------------------------------------------------------------------------------------------------------------  Assessment / Plan     Active Hospital Problems    Diagnosis POA   • Symptomatic bradycardia [R00.1] Yes   • Hypomagnesemia [E83.42] Yes       ASSESSMENT/PLAN:  · Symptomatic bradycardia:  Fall precautions. Orthostatics ordered.  US carotid and echocardiogram ordered.  Will hold home Metoprolol and Clonidine, as these are likely impacting bradycardia.      · Near syncope with prior syncopal episodes: Will hydrate and continue fall precautions.  CT head unremarkable. Cardiology consultation placed. Will monitor telemetry. D-dimer has been added. Cardiac diet added.  TSH WNL.     · Abnormal EKG with T-wave inversion:  Troponin x3 q6 and telemetry monitoring ordered.  Cardiology consultation placed.     · Hypomagnesemia: Electrolyte replacement added per protocol.     · Uncontrolled hypertension, improved:  IV Hydralazine 10mg ordered for SBP>160mmHg.      · Diabetes mellitus type 2:  FSBG ACHS.  SSI PRN.  Hemoglobin a1c added. She is on Metformin at home.       ----------  -DVT prophylaxis: SQ Heparin  -Activity: Up with assist/Fall precautions  -Expected length of stay: INPATIENT status due to the need for care which can only be reasonably provided in an hospital setting such as aggressive/expedited ancillary services and/or consultation services, the necessity for IV medications, close physician monitoring and/or the possible need for procedures.  In such, I feel patient’s risk for adverse outcomes and need for care warrant INPATIENT evaluation and predict the patient’s care encounter to likely last beyond 2 midnights.      Valentina Roth PA-C  10/02/19  10:00 PM  Pager # [phone]  ---------------------------------------------------------------------------------------------------------------------

## 2019-10-03 NOTE — CONSULTS
Cardiology  CONSULT NOTE    Consults    Patient Identification:  Name:  Mariluz Jenkins  Age:  74 y.o.  Sex:  female  :  1945  MRN:  [id number]  Visit Number:  [id number]  Primary care provider:  Delfin Mccarty APRN    Subjective     Referring MD- hospitalist on-call    Reason for the consult:  Orthostatic dizziness, orthostatic drop in BP, dizziness and bradycardia    Chief complaints:  Orthostatic dizziness      History of presenting illness:    74-year-old woman with history of long-standing DM type II and hypertension presented to the emergency room with chief complaint of dizziness and presyncope associated with nausea when she gets up from supine position and start walking for a distance.  The symptom gets relieved upon sitting and in supine position.  Symptom is going on for the last 6 months.    On arrival to ED, she was found to have orthostatic drop in blood pressure.  She has supine hypertension.  ECG showed sinus bradycardia with a heart rate 53 bpm and ST-T changes suggestive of LVH.    She denied history of chest pain or angina.  Dyspnea on exertion functional class II.  Smoker.  No history of leg edema.    She has history of positive calcium score in the coronaries when she had a CT exam several years ago.  Subsequently she had a nuclear stress test and it showed no ischemia.  No history of established coronary artery disease, prior MI, CHF or cardiac arrhythmia.    Patient was taking metoprolol and clonidine-possible reason for her bradycardia.    Cardiac risk factors- DM type II, smoking, hypertension and hyperlipidemia.      --------------------------------------------------------------------------------------------------------------------  Review of Systems:    Constitutional- fatigue  ENT- slightly diminished hearing  Cardiovascular-as above  Respiratory-dyspnea upon exertion  GI- nausea  Endocrine-lipid disorder and diabetes mellitus  Other organ system  involvement-negative    ---------------------------------------------------------------------------------------------------------------------   Past History:  Family History   Problem Relation Age of Onset   • Heart disease Sister      Past Medical History:   Diagnosis Date   • Diabetes (CMS/[facility])    • Hypertension      History reviewed. No pertinent surgical history.  Social History     Socioeconomic History   • Marital status: Unknown     Spouse name: Not on file   • Number of children: Not on file   • Years of education: Not on file   • Highest education level: Not on file   Tobacco Use   • Smoking status: Current Every Day Smoker   Substance and Sexual Activity   • Alcohol use: No     Frequency: Never   • Drug use: No   • Sexual activity: Defer     ---------------------------------------------------------------------------------------------------------------------   Allergies:  Patient has no known allergies.  ---------------------------------------------------------------------------------------------------------------------   Prior to Admission Medications     Prescriptions Last Dose Informant Patient Reported? Taking?    amLODIPine (NORVASC) 10 MG tablet 10/2/2019 Medication Bottle Yes Yes    Take 10 mg by mouth Daily.    aspirin 81 MG EC tablet 10/2/2019 Medication Bottle Yes Yes    Take 81 mg by mouth Daily.    busPIRone (BUSPAR) 5 MG tablet 10/2/2019 Medication Bottle Yes Yes    Take 5 mg by mouth 2 (Two) Times a Day.    cloNIDine (CATAPRES) 0.3 MG tablet 10/2/2019 Medication Bottle Yes Yes    Take 0.3 mg by mouth 3 (Three) Times a Day As Needed for High Blood Pressure.    metFORMIN (GLUCOPHAGE) 1000 MG tablet 10/2/2019 Medication Bottle Yes Yes    Take 1,000 mg by mouth 2 (Two) Times a Day With Meals.    metoprolol tartrate (LOPRESSOR) 50 MG tablet 10/2/2019 Medication Bottle Yes Yes    Take 50 mg by mouth 2 (Two) Times a Day.    PARoxetine (PAXIL) 20 MG tablet 10/2/2019 Medication Bottle Yes Yes    Take  20 mg by mouth Every Morning.    traZODone (DESYREL) 50 MG tablet 10/1/2019 Medication Bottle Yes Yes    Take 50 mg by mouth Every Night.        [facility] Meds:    [START ON 10/4/2019] amLODIPine 2.5 mg Oral Q24H   heparin (porcine) 5,000 Units Subcutaneous Q12H   influenza vaccine 0.5 mL Intramuscular Once   insulin aspart 0-7 Units Subcutaneous 4x Daily AC & at Bedtime   losartan 50 mg Oral Q24H   magnesium oxide 400 mg Oral Daily   midodrine 5 mg Oral TID AC   sodium chloride 10 mL Intravenous Q12H       sodium chloride 75 mL/hr Last Rate: 75 mL/hr (10/02/19 1956)     ---------------------------------------------------------------------------------------------------------------------     Objective     Vital Signs:  Temp:  [98 °F (36.7 °C)-98.3 °F (36.8 °C)] 98.3 °F (36.8 °C)  Heart Rate:  [52-67] 62  Resp:  [18] 18  BP: (123-206)/(45-96) 157/72      10/02/19  1640 10/02/19  2056 10/03/19  0500   Weight: 59 kg (130 lb) 61.5 kg (135 lb 8 oz) 59.4 kg (131 lb)     Body mass index is 21.14 kg/m².  ---------------------------------------------------------------------------------------------------------------------   Physical exam:  General Appearance:    Alert, cooperative, in no acute distress   Head:    Normocephalic, without obvious abnormality, atraumatic   Eyes:            Lids and lashes normal, conjunctivae and sclerae normal, no   icterus, no pallor, corneas clear, PERRLA   Ears:   Bilateral partial deafness.  Uses hearing aid.   Throat:   No oral lesions, no thrush, oral mucosa moist   Neck:   No adenopathy, supple, trachea midline, no thyromegaly, no   carotid bruit, no JVD   Back:     No kyphosis present, no scoliosis present, no skin lesions,      erythema or scars, no tenderness to percussion or                   palpation,   range of motion normal   Lungs:     Clear to auscultation,respirations regular, even and                  unlabored    Heart:   Regular rhythm.  Bradycardia.  No murmurs.   Chest Wall:     No abnormalities observed   Abdomen:     Normal bowel sounds, no masses, no organomegaly, soft        non-tender, non-distended, no guarding, no rebound                tenderness   Rectal:     Deferred   Extremities:  No pedal edema   Pulses:   Pulses palpable and equal bilaterally   Skin:   No bleeding, bruising or rash       Neurologic:  No focal neurological deficit         Telemetry:  Sinus bradycardia.  Heart rate in 50s    Orthostatic BP was checked this morning.  There was 20mmHg drop in BP from supine to standing position.    ---------------------------------------------------------------------------------------------------------------------   EKG:   Sinus bradycardia with voltage criteria for LVH with repolarization abnormality.    I   ---------------------------------------------------------------------------------------------------------------------   Results from last 7 days   Lab Units 10/03/19  0128 10/02/19  1651   WBC 10*3/mm3 8.45 9.46   HEMOGLOBIN g/dL 11.7* 12.2   HEMATOCRIT % 36.3 38.1   MCV fL 84.4 84.9   MCHC g/dL 32.2 32.0   PLATELETS 10*3/mm3 230 234         Results from last 7 days   Lab Units 10/03/19  0128 10/02/19  1651   SODIUM mmol/L 136 136   POTASSIUM mmol/L 3.5 3.9   MAGNESIUM mg/dL  --  1.4*   CHLORIDE mmol/L 96* 97*   CO2 mmol/L 26.4 24.9   BUN mg/dL 12 11   CREATININE mg/dL 0.75 0.84   EGFR IF NONAFRICN AM mL/min/1.73 76 66   CALCIUM mg/dL 9.4 9.5   GLUCOSE mg/dL 131* 138*   ALBUMIN g/dL 3.78 4.00   BILIRUBIN mg/dL 0.2 0.3   ALK PHOS U/L 62 59   AST (SGOT) U/L 9 10   ALT (SGPT) U/L 6 6   Estimated Creatinine Clearance: 58.7 mL/min (by C-G formula based on SCr of 0.75 mg/dL).  No results found for: AMMONIA  Results from last 7 days   Lab Units 10/03/19  0128 10/02/19  2015 10/02/19  1651   TROPONIN T ng/mL <0.010 <0.010 <0.010         Lab Results   Component Value Date    HGBA1C 5.90 (H) 10/02/2019     Lab Results   Component Value Date    TSH 1.700 10/02/2019     No results  found for: PREGTESTUR, PREGSERUM, HCG, HCGQUANT  Pain Management Panel     There is no flowsheet data to display.                        ---------------------------------------------------------------------------------------------------------------------   Imaging Results (last 7 days)     Procedure Component Value Units Date/Time    CT Head Without Contrast [[id number]] Collected:  10/02/19 1904     Updated:  10/02/19 1907    Narrative:       EXAMINATION: CT HEAD WO CONTRAST-      CLINICAL INDICATION:     dizziness/near syncope     COMPARISON:    None     Technique: Multiple CT axial images were obtained through the level of  the brain without IV contrast administration. Reformatted images in the  coronal and/or sagittal plane(s) were generated from the axial data set  to facilitate diagnostic accuracy and/or surgical planning.     Radiation dose reduction techniques were utilized per ALARA protocol.  Automated exposure control was initiated through either or [employer] or  [employer] software packages by  protocol.       DOSE (DLP mGy-cm):     FINDINGS:     Brain: Unremarkable. No parenchymal hemorrhage or mass. No white matter  abnormality. No areas of mass effect. Mild chronic small vessel ischemic  disease.  Ventricles: Unremarkable. No hydrocephalus.  Extra-axial spaces: Unremarkable. No extra-axial hemorrhage. No  extra-axial masses.  Bones: Unremarkable. No acute fracture identified.  Sinuses: Unremarkable as visualized. No air-fluid levels.  Mastoids: Right greater than left mastoid effusions.  Soft Tissues: Unremarkable.          Impression:       1. No acute intracranial abnormality.  2. Bilateral mastoid effusions.     This report was finalized on 10/2/2019 7:05 PM by Dr. Pineda Yousif MD.       XR Chest 1 View [[id number]] Collected:  10/02/19 1856     Updated:  10/02/19 1859    Narrative:       EXAMINATION: XR CHEST 1 VW-      CLINICAL INDICATION:     Weak/Dizzy/AMS triage protocol      TECHNIQUE:  XR CHEST 1 VW-      COMPARISON: NONE      FINDINGS:      Lungs are aerated.   Heart size, mediastinum, and pulmonary vascularity are unremarkable.   No pneumothorax.   No effusions.   No acute osseous findings.          Impression:       No radiographic evidence of acute cardiac or pulmonary  disease.     This report was finalized on 10/2/2019 6:57 PM by Dr. Pineda Yousif MD.             I have personally reviewed the radiology images and read the final radiology report.        Assessment      1.  Orthostatic dizziness secondary to orthostatic drop in BP.         Autonomic neuropathy cannot be ruled out in this patient with long-standing DM       Worsened by antihypertensive medications  2.  Sinus bradycardia due to medications- metoprolol and clonidine  3.  Abnormal ECG.  Showing LVH with repolarization abnormality.  Cannot rule out cardiac ischemia.  4.  Previous history of presence of calcium in the coronaries on CT exam  5.  Multiple cardiac risk factors- DM type II, smoking, hypertension and hyperlipidemia    Recommendations     1.  For orthostatic hypotension-    amlodipine dose was decreased from 10 mg p.o. daily to 5 mg p.o. daily as it is a direct arteriolar dilator which might worsen her symptoms.  She will take amlodipine in the a.m.  Started losartan 50 mg p.o. daily at p.m.  Metoprolol is discontinued due to bradycardia.  Clonidine was not discontinued but decreased the dose and frequency to 0.1 mg p.o. twice daily to avoid rebound hypertension.  Added Midodrin 2.5 mg p.o. 3 times daily.  Educated to do some leg exercise before getting up from sitting or supine position.    2.  History of presence of calcium in the coronaries and has abnormal ECG and multiple cardiac risk factors.  Started aspirin 81 mg p.o. daily and atorvastatin 20 mg p.o. daily.  Scheduled her for a nuclear stress test in a.m.  Cardiac risk factors modifications were discussed and she was strongly instructed to stop  smoking.    Thank you for the opportunity to participate in the care of your patient. Please do not hesitate to call with any questions or concerns.     Sharri Ac MD, FACC  10/03/19  10:23 AM

## 2019-10-04 ENCOUNTER — APPOINTMENT (OUTPATIENT)
Dept: CARDIOLOGY | Facility: HOSPITAL | Age: 74
End: 2019-10-04

## 2019-10-04 ENCOUNTER — APPOINTMENT (OUTPATIENT)
Dept: NUCLEAR MEDICINE | Facility: HOSPITAL | Age: 74
End: 2019-10-04

## 2019-10-04 LAB
ANION GAP SERPL CALCULATED.3IONS-SCNC: 11.1 MMOL/L (ref 5–15)
BH CV ECHO MEAS - % IVS THICK: 8.9 %
BH CV ECHO MEAS - % LVPW THICK: 25.2 %
BH CV ECHO MEAS - ACS: 1.5 CM
BH CV ECHO MEAS - AO MAX PG: 8.9 MMHG
BH CV ECHO MEAS - AO MEAN PG: 6 MMHG
BH CV ECHO MEAS - AO ROOT AREA (BSA CORRECTED): 1.8
BH CV ECHO MEAS - AO ROOT AREA: 7.3 CM^2
BH CV ECHO MEAS - AO ROOT DIAM: 3.1 CM
BH CV ECHO MEAS - AO V2 MAX: 149 CM/SEC
BH CV ECHO MEAS - AO V2 MEAN: 112 CM/SEC
BH CV ECHO MEAS - AO V2 VTI: 31.6 CM
BH CV ECHO MEAS - BSA(HAYCOCK): 1.7 M^2
BH CV ECHO MEAS - BSA: 1.7 M^2
BH CV ECHO MEAS - BZI_BMI: 21.1 KILOGRAMS/M^2
BH CV ECHO MEAS - BZI_METRIC_HEIGHT: 167.6 CM
BH CV ECHO MEAS - BZI_METRIC_WEIGHT: 59.4 KG
BH CV ECHO MEAS - EDV(CUBED): 112.7 ML
BH CV ECHO MEAS - EDV(MOD-SP4): 53 ML
BH CV ECHO MEAS - EDV(TEICH): 109.1 ML
BH CV ECHO MEAS - EF(CUBED): 80.4 %
BH CV ECHO MEAS - EF(MOD-SP4): 69.8 %
BH CV ECHO MEAS - EF(TEICH): 72.8 %
BH CV ECHO MEAS - ESV(CUBED): 22.1 ML
BH CV ECHO MEAS - ESV(MOD-SP4): 16 ML
BH CV ECHO MEAS - ESV(TEICH): 29.7 ML
BH CV ECHO MEAS - FS: 41.9 %
BH CV ECHO MEAS - IVS/LVPW: 0.85
BH CV ECHO MEAS - IVSD: 1.3 CM
BH CV ECHO MEAS - IVSS: 1.4 CM
BH CV ECHO MEAS - LA DIMENSION: 3.5 CM
BH CV ECHO MEAS - LA/AO: 1.1
BH CV ECHO MEAS - LV DIASTOLIC VOL/BSA (35-75): 31.7 ML/M^2
BH CV ECHO MEAS - LV MASS(C)D: 275.8 GRAMS
BH CV ECHO MEAS - LV MASS(C)DI: 165.1 GRAMS/M^2
BH CV ECHO MEAS - LV MASS(C)S: 170.6 GRAMS
BH CV ECHO MEAS - LV MASS(C)SI: 102.1 GRAMS/M^2
BH CV ECHO MEAS - LV SYSTOLIC VOL/BSA (12-30): 9.6 ML/M^2
BH CV ECHO MEAS - LVIDD: 4.8 CM
BH CV ECHO MEAS - LVIDS: 2.8 CM
BH CV ECHO MEAS - LVLD AP4: 6.9 CM
BH CV ECHO MEAS - LVLS AP4: 5.6 CM
BH CV ECHO MEAS - LVOT AREA (M): 2.8 CM^2
BH CV ECHO MEAS - LVOT AREA: 2.8 CM^2
BH CV ECHO MEAS - LVOT DIAM: 1.9 CM
BH CV ECHO MEAS - LVPWD: 1.5 CM
BH CV ECHO MEAS - LVPWS: 1.9 CM
BH CV ECHO MEAS - MV A MAX VEL: 104 CM/SEC
BH CV ECHO MEAS - MV E MAX VEL: 82.9 CM/SEC
BH CV ECHO MEAS - MV E/A: 0.8
BH CV ECHO MEAS - PA ACC SLOPE: 1289 CM/SEC^2
BH CV ECHO MEAS - PA ACC TIME: 0.11 SEC
BH CV ECHO MEAS - PA PR(ACCEL): 28.2 MMHG
BH CV ECHO MEAS - RAP SYSTOLE: 10 MMHG
BH CV ECHO MEAS - RVSP: 33.8 MMHG
BH CV ECHO MEAS - SI(AO): 138.2 ML/M^2
BH CV ECHO MEAS - SI(CUBED): 54.2 ML/M^2
BH CV ECHO MEAS - SI(MOD-SP4): 22.1 ML/M^2
BH CV ECHO MEAS - SI(TEICH): 47.5 ML/M^2
BH CV ECHO MEAS - SV(AO): 230.9 ML
BH CV ECHO MEAS - SV(CUBED): 90.6 ML
BH CV ECHO MEAS - SV(MOD-SP4): 37 ML
BH CV ECHO MEAS - SV(TEICH): 79.4 ML
BH CV ECHO MEAS - TR MAX VEL: 244 CM/SEC
BH CV NUCLEAR PRIOR STUDY: 3
BH CV STRESS BP STAGE 1: NORMAL
BH CV STRESS BP STAGE 2: NORMAL
BH CV STRESS COMMENTS STAGE 1: NORMAL
BH CV STRESS COMMENTS STAGE 2: NORMAL
BH CV STRESS DOSE REGADENOSON STAGE 1: 0.4
BH CV STRESS DURATION MIN STAGE 1: 0
BH CV STRESS DURATION MIN STAGE 2: 4
BH CV STRESS DURATION SEC STAGE 1: 10
BH CV STRESS DURATION SEC STAGE 2: 0
BH CV STRESS HR STAGE 1: 110
BH CV STRESS HR STAGE 2: 83
BH CV STRESS PROTOCOL 1: NORMAL
BH CV STRESS RECOVERY BP: NORMAL MMHG
BH CV STRESS RECOVERY HR: 83 BPM
BH CV STRESS STAGE 1: 1
BH CV STRESS STAGE 2: 2
BILIRUB UR QL STRIP: NEGATIVE
BUN BLD-MCNC: 9 MG/DL (ref 8–23)
BUN/CREAT SERPL: 13 (ref 7–25)
CALCIUM SPEC-SCNC: 9.3 MG/DL (ref 8.6–10.5)
CHLORIDE SERPL-SCNC: 101 MMOL/L (ref 98–107)
CLARITY UR: CLEAR
CO2 SERPL-SCNC: 25.9 MMOL/L (ref 22–29)
COLOR UR: YELLOW
CREAT BLD-MCNC: 0.69 MG/DL (ref 0.57–1)
GFR SERPL CREATININE-BSD FRML MDRD: 83 ML/MIN/1.73
GLUCOSE BLD-MCNC: 93 MG/DL (ref 65–99)
GLUCOSE BLDC GLUCOMTR-MCNC: 126 MG/DL (ref 70–130)
GLUCOSE BLDC GLUCOMTR-MCNC: 171 MG/DL (ref 70–130)
GLUCOSE BLDC GLUCOMTR-MCNC: 209 MG/DL (ref 70–130)
GLUCOSE UR STRIP-MCNC: NEGATIVE MG/DL
HGB UR QL STRIP.AUTO: NEGATIVE
KETONES UR QL STRIP: NEGATIVE
LEUKOCYTE ESTERASE UR QL STRIP.AUTO: NEGATIVE
LV EF NUC BP: 61 %
MAGNESIUM SERPL-MCNC: 1.7 MG/DL (ref 1.6–2.4)
MAXIMAL PREDICTED HEART RATE: 147 BPM
MAXIMAL PREDICTED HEART RATE: 147 BPM
NITRITE UR QL STRIP: NEGATIVE
PERCENT MAX PREDICTED HR: 74.83 %
PH UR STRIP.AUTO: 7 [PH] (ref 5–8)
POTASSIUM BLD-SCNC: 3.4 MMOL/L (ref 3.5–5.2)
PROT UR QL STRIP: NEGATIVE
SODIUM BLD-SCNC: 138 MMOL/L (ref 136–145)
SP GR UR STRIP: 1.01 (ref 1–1.03)
STRESS BASELINE BP: NORMAL MMHG
STRESS BASELINE HR: 68 BPM
STRESS PERCENT HR: 88 %
STRESS POST PEAK BP: NORMAL MMHG
STRESS POST PEAK HR: 110 BPM
STRESS TARGET HR: 125 BPM
STRESS TARGET HR: 125 BPM
UROBILINOGEN UR QL STRIP: NORMAL

## 2019-10-04 PROCEDURE — 25010000002 DIPHENHYDRAMINE PER 50 MG: Performed by: FAMILY MEDICINE

## 2019-10-04 PROCEDURE — 78452 HT MUSCLE IMAGE SPECT MULT: CPT | Performed by: INTERNAL MEDICINE

## 2019-10-04 PROCEDURE — 93017 CV STRESS TEST TRACING ONLY: CPT

## 2019-10-04 PROCEDURE — 82962 GLUCOSE BLOOD TEST: CPT

## 2019-10-04 PROCEDURE — 25010000002 MAGNESIUM SULFATE 2 GM/50ML SOLUTION: Performed by: FAMILY MEDICINE

## 2019-10-04 PROCEDURE — 78452 HT MUSCLE IMAGE SPECT MULT: CPT

## 2019-10-04 PROCEDURE — 94799 UNLISTED PULMONARY SVC/PX: CPT

## 2019-10-04 PROCEDURE — 63710000001 INSULIN ASPART PER 5 UNITS: Performed by: PHYSICIAN ASSISTANT

## 2019-10-04 PROCEDURE — 25010000002 METHYLPREDNISOLONE PER 40 MG: Performed by: FAMILY MEDICINE

## 2019-10-04 PROCEDURE — 93306 TTE W/DOPPLER COMPLETE: CPT

## 2019-10-04 PROCEDURE — 0 TECHNETIUM SESTAMIBI: Performed by: FAMILY MEDICINE

## 2019-10-04 PROCEDURE — A9500 TC99M SESTAMIBI: HCPCS | Performed by: FAMILY MEDICINE

## 2019-10-04 PROCEDURE — 93018 CV STRESS TEST I&R ONLY: CPT | Performed by: INTERNAL MEDICINE

## 2019-10-04 PROCEDURE — 81003 URINALYSIS AUTO W/O SCOPE: CPT | Performed by: FAMILY MEDICINE

## 2019-10-04 PROCEDURE — 80048 BASIC METABOLIC PNL TOTAL CA: CPT | Performed by: FAMILY MEDICINE

## 2019-10-04 PROCEDURE — 25010000002 HEPARIN (PORCINE) PER 1000 UNITS: Performed by: HOSPITALIST

## 2019-10-04 PROCEDURE — 99232 SBSQ HOSP IP/OBS MODERATE 35: CPT | Performed by: FAMILY MEDICINE

## 2019-10-04 PROCEDURE — 25010000002 REGADENOSON 0.4 MG/5ML SOLUTION: Performed by: INTERNAL MEDICINE

## 2019-10-04 PROCEDURE — 25010000002 HYDRALAZINE PER 20 MG: Performed by: PHYSICIAN ASSISTANT

## 2019-10-04 PROCEDURE — 25010000002 AMINOPHYLLINE PER 250 MG: Performed by: NURSE PRACTITIONER

## 2019-10-04 PROCEDURE — 83735 ASSAY OF MAGNESIUM: CPT | Performed by: FAMILY MEDICINE

## 2019-10-04 RX ORDER — LOSARTAN POTASSIUM 50 MG/1
100 TABLET ORAL NIGHTLY
Status: DISCONTINUED | OUTPATIENT
Start: 2019-10-04 | End: 2019-10-11 | Stop reason: HOSPADM

## 2019-10-04 RX ORDER — LOSARTAN POTASSIUM 50 MG/1
100 TABLET ORAL
Status: DISCONTINUED | OUTPATIENT
Start: 2019-10-04 | End: 2019-10-04

## 2019-10-04 RX ORDER — DIPHENHYDRAMINE HYDROCHLORIDE 50 MG/ML
25 INJECTION INTRAMUSCULAR; INTRAVENOUS EVERY 6 HOURS PRN
Status: DISCONTINUED | OUTPATIENT
Start: 2019-10-04 | End: 2019-10-11 | Stop reason: HOSPADM

## 2019-10-04 RX ORDER — FAMOTIDINE 20 MG/1
20 TABLET, FILM COATED ORAL
Status: DISCONTINUED | OUTPATIENT
Start: 2019-10-04 | End: 2019-10-11 | Stop reason: HOSPADM

## 2019-10-04 RX ORDER — MAGNESIUM SULFATE HEPTAHYDRATE 40 MG/ML
2 INJECTION, SOLUTION INTRAVENOUS ONCE
Status: COMPLETED | OUTPATIENT
Start: 2019-10-04 | End: 2019-10-04

## 2019-10-04 RX ORDER — AMINOPHYLLINE DIHYDRATE 25 MG/ML
100 INJECTION, SOLUTION INTRAVENOUS
Status: COMPLETED | OUTPATIENT
Start: 2019-10-04 | End: 2019-10-04

## 2019-10-04 RX ORDER — METHYLPREDNISOLONE SODIUM SUCCINATE 40 MG/ML
40 INJECTION, POWDER, LYOPHILIZED, FOR SOLUTION INTRAMUSCULAR; INTRAVENOUS EVERY 12 HOURS
Status: DISCONTINUED | OUTPATIENT
Start: 2019-10-04 | End: 2019-10-05

## 2019-10-04 RX ADMIN — TECHNETIUM TC 99M SESTAMIBI 1 DOSE: 1 INJECTION INTRAVENOUS at 11:40

## 2019-10-04 RX ADMIN — HEPARIN SODIUM 5000 UNITS: 5000 INJECTION INTRAVENOUS; SUBCUTANEOUS at 20:38

## 2019-10-04 RX ADMIN — ATORVASTATIN CALCIUM 20 MG: 20 TABLET, FILM COATED ORAL at 20:38

## 2019-10-04 RX ADMIN — METHYLPREDNISOLONE SODIUM SUCCINATE 40 MG: 40 INJECTION, POWDER, FOR SOLUTION INTRAMUSCULAR; INTRAVENOUS at 16:39

## 2019-10-04 RX ADMIN — INSULIN ASPART 3 UNITS: 100 INJECTION, SOLUTION INTRAVENOUS; SUBCUTANEOUS at 20:38

## 2019-10-04 RX ADMIN — HYDRALAZINE HYDROCHLORIDE 10 MG: 20 INJECTION INTRAMUSCULAR; INTRAVENOUS at 18:21

## 2019-10-04 RX ADMIN — TECHNETIUM TC 99M SESTAMIBI 1 DOSE: 1 INJECTION INTRAVENOUS at 08:50

## 2019-10-04 RX ADMIN — SODIUM CHLORIDE, PRESERVATIVE FREE 10 ML: 5 INJECTION INTRAVENOUS at 20:45

## 2019-10-04 RX ADMIN — SODIUM CHLORIDE 75 ML/HR: 9 INJECTION, SOLUTION INTRAVENOUS at 14:24

## 2019-10-04 RX ADMIN — INSULIN ASPART 2 UNITS: 100 INJECTION, SOLUTION INTRAVENOUS; SUBCUTANEOUS at 16:39

## 2019-10-04 RX ADMIN — MAGNESIUM SULFATE IN WATER 2 G: 40 INJECTION, SOLUTION INTRAVENOUS at 08:12

## 2019-10-04 RX ADMIN — BUSPIRONE HYDROCHLORIDE 5 MG: 5 TABLET ORAL at 20:38

## 2019-10-04 RX ADMIN — FAMOTIDINE 20 MG: 20 TABLET, FILM COATED ORAL at 16:39

## 2019-10-04 RX ADMIN — LOSARTAN POTASSIUM 100 MG: 50 TABLET, FILM COATED ORAL at 20:38

## 2019-10-04 RX ADMIN — REGADENOSON 0.4 MG: 0.08 INJECTION, SOLUTION INTRAVENOUS at 11:40

## 2019-10-04 RX ADMIN — TRAZODONE HYDROCHLORIDE 50 MG: 50 TABLET ORAL at 20:38

## 2019-10-04 RX ADMIN — HEPARIN SODIUM 5000 UNITS: 5000 INJECTION INTRAVENOUS; SUBCUTANEOUS at 08:12

## 2019-10-04 RX ADMIN — AMINOPHYLLINE 100 MG: 25 INJECTION, SOLUTION INTRAVENOUS at 11:48

## 2019-10-04 RX ADMIN — HYDRALAZINE HYDROCHLORIDE 10 MG: 20 INJECTION INTRAMUSCULAR; INTRAVENOUS at 12:16

## 2019-10-04 RX ADMIN — SODIUM CHLORIDE, PRESERVATIVE FREE 10 ML: 5 INJECTION INTRAVENOUS at 08:12

## 2019-10-04 RX ADMIN — DIPHENHYDRAMINE HYDROCHLORIDE 25 MG: 50 INJECTION INTRAMUSCULAR; INTRAVENOUS at 19:00

## 2019-10-04 RX ADMIN — METFORMIN HYDROCHLORIDE 1000 MG: 500 TABLET ORAL at 16:39

## 2019-10-04 NOTE — PROGRESS NOTES
"  Patient Identification:  Name:  Mariluz Jenkins  Age:  74 y.o.  Sex:  female  :  1945  MRN:  [id number]  Visit Number:  [id number]  Primary care provider:  Delfin Mccarty APRN    Reason for follow-up:  Orthostatic dizziness secondary to orthostatic drop in BP.  Bradycardia-resolved.    Subjective     Patient has history of dizziness when she gets up and walk.  Associated with orthostatic drop in BP.  Her heart rate has improved to normal range.  BP is still high.-In supine position.      Medication Review:     amLODIPine 2.5 mg Oral Q24H   aspirin 81 mg Oral Daily   atorvastatin 20 mg Oral Nightly   busPIRone 5 mg Oral BID   heparin (porcine) 5,000 Units Subcutaneous Q12H   insulin aspart 0-7 Units Subcutaneous 4x Daily AC & at Bedtime   losartan 100 mg Oral Q24H   magnesium oxide 400 mg Oral Daily   magnesium sulfate 2 g Intravenous Once   metFORMIN 1,000 mg Oral BID With Meals   midodrine 2.5 mg Oral TID AC   PARoxetine 20 mg Oral Daily   sodium chloride 10 mL Intravenous Q12H   traZODone 50 mg Oral Nightly         Vital Sign Min/Max for last 24 hours  Temp  Min: 98 °F (36.7 °C)  Max: 98.7 °F (37.1 °C)   BP  Min: 106/61  Max: 199/93   Pulse  Min: 57  Max: 72   Resp  Min: 18  Max: 18   SpO2  Min: 94 %  Max: 98 %   No Data Recorded   Weight  Min: 59.6 kg (131 lb 8 oz)  Max: 59.6 kg (131 lb 8 oz)     Flowsheet Rows      First Filed Value   Admission Height  167.6 cm (66\") Documented at 10/02/2019 1640   Admission Weight  59 kg (130 lb) Documented at 10/02/2019 1640          Objective       Physical Exam:     General Appearance:    Alert, cooperative, in no acute distress   Head:    Normocephalic, without obvious abnormality, atraumatic   Eyes:            Lids and lashes normal, conjunctivae and sclerae normal, no   icterus, no pallor, corneas clear, PERRLA   Ears:    Bilateral partial deafness   Throat:   No oral lesions, no thrush, oral mucosa moist   Neck:   No adenopathy, supple, trachea " midline, no thyromegaly, no   carotid bruit, no JVD   Back:     No kyphosis present, no scoliosis present, no skin lesions,      erythema or scars, no tenderness to percussion or                   palpation,   range of motion normal   Lungs:     Clear to auscultation,respirations regular, even and                  unlabored    Heart:   Regular rhythm.  Bradycardia.  No murmurs.   Chest Wall:    No abnormalities observed   Abdomen:     Normal bowel sounds, no masses, no organomegaly, soft        non-tender, non-distended, no guarding, no rebound                tenderness   Rectal:     Deferred   Extremities:  No pedal edema          Telemetry:  Normal sinus rhythm.  Heart rate 60s.        Labs  Results from last 7 days   Lab Units 10/03/19  0128 10/02/19  1651   WBC 10*3/mm3 8.45 9.46   HEMOGLOBIN g/dL 11.7* 12.2   HEMATOCRIT % 36.3 38.1   PLATELETS 10*3/mm3 230 234     Results from last 7 days   Lab Units 10/04/19  0429 10/03/19  0128 10/02/19  1651   SODIUM mmol/L 138 136 136   POTASSIUM mmol/L 3.4* 3.5 3.9   CHLORIDE mmol/L 101 96* 97*   CO2 mmol/L 25.9 26.4 24.9   BUN mg/dL 9 12 11   CREATININE mg/dL 0.69 0.75 0.84   CALCIUM mg/dL 9.3 9.4 9.5   GLUCOSE mg/dL 93 131* 138*     Results from last 7 days   Lab Units 10/03/19  0128 10/02/19  1651   BILIRUBIN mg/dL 0.2 0.3   ALK PHOS U/L 62 59   AST (SGOT) U/L 9 10   ALT (SGPT) U/L 6 6     Results from last 7 days   Lab Units 10/04/19  0429 10/03/19  1130 10/02/19  1651   MAGNESIUM mg/dL 1.7 2.0 1.4*             Results from last 7 days   Lab Units 10/03/19  0128 10/02/19  2015 10/02/19  1651   TROPONIN T ng/mL <0.010 <0.010 <0.010           Radiology: Ct Head Without Contrast    Result Date: 10/2/2019  1. No acute intracranial abnormality. 2. Bilateral mastoid effusions.  This report was finalized on 10/2/2019 7:05 PM by Dr. Pineda Yousif MD.      Xr Chest 1 View    Result Date: 10/2/2019  No radiographic evidence of acute cardiac or pulmonary disease.  This report  was finalized on 10/2/2019 6:57 PM by Dr. Pineda Yousif MD.      Us Carotid Bilateral    Result Date: 10/3/2019  1. Mild/moderate plaque right greater than left carotid systems. No occlusion. 2. No hemodynamically significant stenosis of either RIGHT or LEFT ICA. 3. Antegrade flow noted both vertebral arteries.  This report was finalized on 10/3/2019 6:42 PM by Dr. Pineda Yousif MD.        Assessment      1.  Orthostatic dizziness secondary to orthostatic drop in BP.         Autonomic neuropathy cannot be ruled out in this patient with long-standing D  2.  Sinus bradycardia due to medications- metoprolol and clonidine.  Resolved.  3.  Abnormal ECG.  Showing LVH with repolarization abnormality.  Cannot rule out cardiac ischemia.  4.  Previous history of presence of calcium in the coronaries on CT exam  5.  Multiple cardiac risk factors- DM type II, smoking, hypertension and hyperlipidemia  6.  Uncontrolled hypertension    Plan     1.  For uncontrolled hypertension- increase losartan 50 mg p.o. daily 200 mg p.o. daily.  Continue amlodipine.  2.  Continue Midodrin  3.  Waiting on echocardiographic study to evaluate left ventricular wall thickness and function  4.  Scheduled for a nuclear stress test for ischemic evaluation due to history of calcification in the coronaries and abnormal ECG.      Sharri Ac MD [facility]  10/04/19  7:52 AM

## 2019-10-04 NOTE — PLAN OF CARE
Problem: Patient Care Overview  Goal: Plan of Care Review  Outcome: Ongoing (interventions implemented as appropriate)    Goal: Discharge Needs Assessment  Outcome: Ongoing (interventions implemented as appropriate)      Problem: Fall Risk (Adult)  Goal: Identify Related Risk Factors and Signs and Symptoms  Outcome: Ongoing (interventions implemented as appropriate)    Goal: Absence of Fall  Outcome: Ongoing (interventions implemented as appropriate)      Problem: Skin Injury Risk (Adult)  Goal: Identify Related Risk Factors and Signs and Symptoms  Outcome: Ongoing (interventions implemented as appropriate)    Goal: Skin Health and Integrity  Outcome: Ongoing (interventions implemented as appropriate)      Problem: Diabetes, Type 2 (Adult)  Goal: Signs and Symptoms of Listed Potential Problems Will be Absent, Minimized or Managed (Diabetes, Type 2)  Outcome: Ongoing (interventions implemented as appropriate)      Problem: Arrhythmia/Dysrhythmia (Symptomatic) (Adult)  Goal: Signs and Symptoms of Listed Potential Problems Will be Absent, Minimized or Managed (Arrhythmia/Dysrhythmia)  Outcome: Ongoing (interventions implemented as appropriate)

## 2019-10-04 NOTE — PLAN OF CARE
Problem: Patient Care Overview  Goal: Plan of Care Review  Outcome: Ongoing (interventions implemented as appropriate)      Problem: Fall Risk (Adult)  Goal: Identify Related Risk Factors and Signs and Symptoms  Outcome: Outcome(s) achieved Date Met: 10/04/19    Goal: Absence of Fall  Outcome: Ongoing (interventions implemented as appropriate)      Problem: Skin Injury Risk (Adult)  Goal: Identify Related Risk Factors and Signs and Symptoms  Outcome: Outcome(s) achieved Date Met: 10/04/19    Goal: Skin Health and Integrity  Outcome: Ongoing (interventions implemented as appropriate)      Problem: Diabetes, Type 2 (Adult)  Goal: Signs and Symptoms of Listed Potential Problems Will be Absent, Minimized or Managed (Diabetes, Type 2)  Outcome: Ongoing (interventions implemented as appropriate)      Problem: Arrhythmia/Dysrhythmia (Symptomatic) (Adult)  Goal: Signs and Symptoms of Listed Potential Problems Will be Absent, Minimized or Managed (Arrhythmia/Dysrhythmia)  Outcome: Ongoing (interventions implemented as appropriate)

## 2019-10-04 NOTE — PROGRESS NOTES
Discharge Planning Assessment   Luis Fernando     Patient Name: Mariluz Jenkins  MRN: [id number]  Today's Date: 10/4/2019    Admit Date: 10/2/2019      Discharge Plan     Row Name 10/04/19 1548       Plan    Plan  Pt admitted on 10/2/19.  Pt lives at home alone and plans to return home at discharge.  Pt currently does not utilize home health services.  Pt currently utilizes cane via unknown provider.  SS will follow and assist with discharge needs.                 HELENA NegreteW

## 2019-10-05 LAB
GLUCOSE BLDC GLUCOMTR-MCNC: 124 MG/DL (ref 70–130)
GLUCOSE BLDC GLUCOMTR-MCNC: 166 MG/DL (ref 70–130)
GLUCOSE BLDC GLUCOMTR-MCNC: 167 MG/DL (ref 70–130)
GLUCOSE BLDC GLUCOMTR-MCNC: 181 MG/DL (ref 70–130)
MAGNESIUM SERPL-MCNC: 1.9 MG/DL (ref 1.6–2.4)
POTASSIUM BLD-SCNC: 3.7 MMOL/L (ref 3.5–5.2)

## 2019-10-05 PROCEDURE — 25010000002 HYDRALAZINE PER 20 MG: Performed by: PHYSICIAN ASSISTANT

## 2019-10-05 PROCEDURE — 99232 SBSQ HOSP IP/OBS MODERATE 35: CPT | Performed by: FAMILY MEDICINE

## 2019-10-05 PROCEDURE — 25010000002 MAGNESIUM SULFATE 2 GM/50ML SOLUTION: Performed by: FAMILY MEDICINE

## 2019-10-05 PROCEDURE — 83735 ASSAY OF MAGNESIUM: CPT | Performed by: FAMILY MEDICINE

## 2019-10-05 PROCEDURE — 25010000002 METHYLPREDNISOLONE PER 40 MG: Performed by: FAMILY MEDICINE

## 2019-10-05 PROCEDURE — 82962 GLUCOSE BLOOD TEST: CPT

## 2019-10-05 PROCEDURE — 99232 SBSQ HOSP IP/OBS MODERATE 35: CPT | Performed by: INTERNAL MEDICINE

## 2019-10-05 PROCEDURE — 25010000002 HEPARIN (PORCINE) PER 1000 UNITS: Performed by: HOSPITALIST

## 2019-10-05 PROCEDURE — 84132 ASSAY OF SERUM POTASSIUM: CPT | Performed by: FAMILY MEDICINE

## 2019-10-05 PROCEDURE — 63710000001 INSULIN ASPART PER 5 UNITS: Performed by: PHYSICIAN ASSISTANT

## 2019-10-05 RX ORDER — CETIRIZINE HYDROCHLORIDE 10 MG/1
5 TABLET ORAL DAILY
Status: DISCONTINUED | OUTPATIENT
Start: 2019-10-05 | End: 2019-10-09

## 2019-10-05 RX ORDER — CLONIDINE HYDROCHLORIDE 0.1 MG/1
0.1 TABLET ORAL ONCE
Status: COMPLETED | OUTPATIENT
Start: 2019-10-05 | End: 2019-10-05

## 2019-10-05 RX ORDER — PSEUDOEPHEDRINE HCL 30 MG
60 TABLET ORAL 3 TIMES DAILY
Status: COMPLETED | OUTPATIENT
Start: 2019-10-05 | End: 2019-10-06

## 2019-10-05 RX ORDER — MAGNESIUM SULFATE HEPTAHYDRATE 40 MG/ML
2 INJECTION, SOLUTION INTRAVENOUS ONCE
Status: COMPLETED | OUTPATIENT
Start: 2019-10-05 | End: 2019-10-05

## 2019-10-05 RX ADMIN — FAMOTIDINE 20 MG: 20 TABLET, FILM COATED ORAL at 08:34

## 2019-10-05 RX ADMIN — MAGNESIUM SULFATE IN WATER 2 G: 40 INJECTION, SOLUTION INTRAVENOUS at 06:17

## 2019-10-05 RX ADMIN — BUSPIRONE HYDROCHLORIDE 5 MG: 5 TABLET ORAL at 08:34

## 2019-10-05 RX ADMIN — INSULIN ASPART 2 UNITS: 100 INJECTION, SOLUTION INTRAVENOUS; SUBCUTANEOUS at 11:30

## 2019-10-05 RX ADMIN — HEPARIN SODIUM 5000 UNITS: 5000 INJECTION INTRAVENOUS; SUBCUTANEOUS at 20:56

## 2019-10-05 RX ADMIN — METHYLPREDNISOLONE SODIUM SUCCINATE 40 MG: 40 INJECTION, POWDER, FOR SOLUTION INTRAMUSCULAR; INTRAVENOUS at 04:32

## 2019-10-05 RX ADMIN — ATORVASTATIN CALCIUM 20 MG: 20 TABLET, FILM COATED ORAL at 20:55

## 2019-10-05 RX ADMIN — ASPIRIN 81 MG: 81 TABLET, COATED ORAL at 08:34

## 2019-10-05 RX ADMIN — SODIUM CHLORIDE, PRESERVATIVE FREE 10 ML: 5 INJECTION INTRAVENOUS at 08:35

## 2019-10-05 RX ADMIN — CLONIDINE HYDROCHLORIDE 0.1 MG: 0.1 TABLET ORAL at 06:16

## 2019-10-05 RX ADMIN — HYDRALAZINE HYDROCHLORIDE 10 MG: 20 INJECTION INTRAMUSCULAR; INTRAVENOUS at 19:25

## 2019-10-05 RX ADMIN — PAROXETINE HYDROCHLORIDE 20 MG: 20 TABLET, FILM COATED ORAL at 08:34

## 2019-10-05 RX ADMIN — MAGNESIUM GLUCONATE 500 MG ORAL TABLET 400 MG: 500 TABLET ORAL at 08:34

## 2019-10-05 RX ADMIN — INSULIN ASPART 2 UNITS: 100 INJECTION, SOLUTION INTRAVENOUS; SUBCUTANEOUS at 20:56

## 2019-10-05 RX ADMIN — PSEUDOEPHEDRINE HCL 60 MG: 30 TABLET, FILM COATED ORAL at 20:55

## 2019-10-05 RX ADMIN — FAMOTIDINE 20 MG: 20 TABLET, FILM COATED ORAL at 17:47

## 2019-10-05 RX ADMIN — HYDRALAZINE HYDROCHLORIDE 10 MG: 20 INJECTION INTRAMUSCULAR; INTRAVENOUS at 04:08

## 2019-10-05 RX ADMIN — METFORMIN HYDROCHLORIDE 1000 MG: 500 TABLET ORAL at 08:34

## 2019-10-05 RX ADMIN — SODIUM CHLORIDE, PRESERVATIVE FREE 10 ML: 5 INJECTION INTRAVENOUS at 20:55

## 2019-10-05 RX ADMIN — METFORMIN HYDROCHLORIDE 1000 MG: 500 TABLET ORAL at 17:47

## 2019-10-05 RX ADMIN — SODIUM CHLORIDE 75 ML/HR: 9 INJECTION, SOLUTION INTRAVENOUS at 02:38

## 2019-10-05 RX ADMIN — PSEUDOEPHEDRINE HCL 60 MG: 30 TABLET, FILM COATED ORAL at 17:47

## 2019-10-05 RX ADMIN — TRAZODONE HYDROCHLORIDE 50 MG: 50 TABLET ORAL at 20:55

## 2019-10-05 RX ADMIN — LOSARTAN POTASSIUM 100 MG: 50 TABLET, FILM COATED ORAL at 20:55

## 2019-10-05 RX ADMIN — HEPARIN SODIUM 5000 UNITS: 5000 INJECTION INTRAVENOUS; SUBCUTANEOUS at 08:33

## 2019-10-05 RX ADMIN — BUSPIRONE HYDROCHLORIDE 5 MG: 5 TABLET ORAL at 20:55

## 2019-10-05 RX ADMIN — PSEUDOEPHEDRINE HCL 60 MG: 30 TABLET, FILM COATED ORAL at 11:30

## 2019-10-05 RX ADMIN — AMLODIPINE BESYLATE 2.5 MG: 5 TABLET ORAL at 08:34

## 2019-10-05 RX ADMIN — CETIRIZINE HYDROCHLORIDE 5 MG: 10 TABLET, FILM COATED ORAL at 11:30

## 2019-10-05 RX ADMIN — INSULIN ASPART 2 UNITS: 100 INJECTION, SOLUTION INTRAVENOUS; SUBCUTANEOUS at 08:35

## 2019-10-05 NOTE — PLAN OF CARE
Problem: Patient Care Overview  Goal: Plan of Care Review  Outcome: Ongoing (interventions implemented as appropriate)      Problem: Fall Risk (Adult)  Goal: Identify Related Risk Factors and Signs and Symptoms  Outcome: Outcome(s) achieved Date Met: 10/05/19    Goal: Absence of Fall  Outcome: Ongoing (interventions implemented as appropriate)      Problem: Skin Injury Risk (Adult)  Goal: Identify Related Risk Factors and Signs and Symptoms  Outcome: Outcome(s) achieved Date Met: 10/05/19    Goal: Skin Health and Integrity  Outcome: Ongoing (interventions implemented as appropriate)      Problem: Diabetes, Type 2 (Adult)  Goal: Signs and Symptoms of Listed Potential Problems Will be Absent, Minimized or Managed (Diabetes, Type 2)  Outcome: Ongoing (interventions implemented as appropriate)      Problem: Arrhythmia/Dysrhythmia (Symptomatic) (Adult)  Goal: Signs and Symptoms of Listed Potential Problems Will be Absent, Minimized or Managed (Arrhythmia/Dysrhythmia)  Outcome: Ongoing (interventions implemented as appropriate)

## 2019-10-05 NOTE — PROGRESS NOTES
[facility] HOSPITALIST    PROGRESS NOTE    Name:  Mariluz Jenkins   Age:  74 y.o.  Sex:  female  :  1945  MRN:  [id number]   Visit Number:  [id number]  Admission Date:  10/2/2019  Date Of Service:  10/05/19  Primary Care Physician:  Delfin Mccarty APRN     LOS: 3 days :  Patient Care Team:  Delfin Mccarty APRN as PCP - General (Nurse Practitioner):    Chief Complaint:      Syncope    Subjective / Interval History:     Patient seen and examined at bedside  Finished with stress test  No adverse events overnight  Results of stress test    Review of Systems:     General ROS: Patient denies any fevers, chills but endorses loss of consciousness.  Respiratory ROS: Denies cough or shortness of breath.  Cardiovascular ROS: Denies chest pain or palpitations. No history of exertional chest pain.  Gastrointestinal ROS: Denies nausea and vomiting. Denies any abdominal pain. No diarrhea.  Neurological ROS: Denies any focal weakness. No loss of consciousness. Denies any numbness. Denies neck pain.  Dermatological ROS: Denies any redness or pruritis.    Vital Signs:    Temp:  [97.5 °F (36.4 °C)-98.4 °F (36.9 °C)] 98.4 °F (36.9 °C)  Heart Rate:  [74-93] 85  Resp:  [18-20] 20  BP: (152-222)/() 210/86    Intake and output:    I/O last 3 completed shifts:  In: 1880 [P.O.:840; I.V.:1040]  Out: 4900 [Urine:4900]  No intake/output data recorded.    Physical Examination:    General Appearance:  Alert and cooperative, not in any acute distress.   Head:  Atraumatic and normocephalic, without obvious abnormality.   Eyes:          PERRLA, conjunctivae and sclerae normal, no Icterus. No pallor. Extra-occular movements are within normal limits.   Neck: Supple, trachea midline, no thyromegaly, no carotid bruit.   Lungs:   Chest shape is normal. Breath sounds heard bilaterally equally.  No crackles or wheezing. No Pleural rub or bronchial breathing.   Heart:  Normal S1 and S2, no murmur, no gallop, no  rub. No JVD   Abdomen:   Normal bowel sounds, no masses, no organomegaly. Soft       non-tender, non-distended, no guarding, no rebound tenderness.   Extremities: Moves all extremities well, no edema, no cyanosis, no            clubbing.   Skin: No bleeding, bruising or rash.   Neurologic: Awake, alert and oriented times 3. Moves all 4 extremities equally.   Laboratory results:    Results from last 7 days   Lab Units 10/05/19  0408 10/04/19  0429 10/03/19  0128 10/02/19  1651   SODIUM mmol/L  --  138 136 136   POTASSIUM mmol/L 3.7 3.4* 3.5 3.9   CHLORIDE mmol/L  --  101 96* 97*   CO2 mmol/L  --  25.9 26.4 24.9   BUN mg/dL  --  9 12 11   CREATININE mg/dL  --  0.69 0.75 0.84   CALCIUM mg/dL  --  9.3 9.4 9.5   BILIRUBIN mg/dL  --   --  0.2 0.3   ALK PHOS U/L  --   --  62 59   ALT (SGPT) U/L  --   --  6 6   AST (SGOT) U/L  --   --  9 10   GLUCOSE mg/dL  --  93 131* 138*     Results from last 7 days   Lab Units 10/03/19  0128 10/02/19  1651   WBC 10*3/mm3 8.45 9.46   HEMOGLOBIN g/dL 11.7* 12.2   HEMATOCRIT % 36.3 38.1   PLATELETS 10*3/mm3 230 234         Results from last 7 days   Lab Units 10/03/19  0128 10/02/19  2015 10/02/19  1651   TROPONIN T ng/mL <0.010 <0.010 <0.010           I have reviewed the patient's laboratory results.    Radiology results:    Imaging Results (last 24 hours)     ** No results found for the last 24 hours. **          I have reviewed the patient's radiology reports.    Medication Review:     I have reviewed the patients active and prn medications.       Symptomatic bradycardia    Hypomagnesemia      Assessment:    Plan:    1)  Symptomatic bradycardia:  Fall precautions. Orthostatics ordered.  US carotid and echocardiogram ordered.  Will hold home Metoprolol and Clonidine, as these are likely impacting bradycardia.       2)  Near syncope with prior syncopal episodes: Will hydrate and continue fall precautions.  CT head unremarkable. Cardiology consultation placed. Will monitor telemetry.  D-dimer has been added. Cardiac diet added.  TSH WNL.      3)  Abnormal EKG with T-wave inversion:  Troponin x3 q6 and telemetry monitoring ordered.  Cardiology consultation placed.      4)  Hypomagnesemia: Electrolyte replacement added per protocol.      5)  Uncontrolled hypertension, improved:  IV Hydralazine 10mg ordered for SBP>160mmHg.      6)  Diabetes mellitus type 2:  FSBG ACHS.  SSI PRN.  Hemoglobin a1c added. She is on Metformin at home.     Awaiting results of stress testing Dr. Ac ordered  We will discuss with family when available      Kaiden Rausch DO  10/05/19  10:24 AM

## 2019-10-05 NOTE — PROGRESS NOTES
"  Patient Identification:  Name:  Mariluz Jenkins  Age:  74 y.o.  Sex:  female  :  1945  MRN:  [id number]  Visit Number:  [id number]  Primary care provider:  Delfin Mccarty APRN    Reason for follow-up:  Orthostatic dizziness secondary to orthostatic drop in BP.  Bradycardia-resolved.    Subjective     Patient has no complaints of orthostasis.  Blood pressure this morning 210 systolic.  Orthostatics checked yesterday dropped only to 155 systolic which is improved over the previous days measurement.      Medication Review:     amLODIPine 2.5 mg Oral Q24H   aspirin 81 mg Oral Daily   atorvastatin 20 mg Oral Nightly   busPIRone 5 mg Oral BID   cetirizine 5 mg Oral Daily   famotidine 20 mg Oral BID AC   heparin (porcine) 5,000 Units Subcutaneous Q12H   insulin aspart 0-7 Units Subcutaneous 4x Daily AC & at Bedtime   losartan 100 mg Oral Nightly   magnesium oxide 400 mg Oral Daily   metFORMIN 1,000 mg Oral BID With Meals   PARoxetine 20 mg Oral Daily   pseudoephedrine 60 mg Oral TID   sodium chloride 10 mL Intravenous Q12H   traZODone 50 mg Oral Nightly         Vital Sign Min/Max for last 24 hours  Temp  Min: 97.5 °F (36.4 °C)  Max: 98.4 °F (36.9 °C)   BP  Min: 152/84  Max: 222/96   Pulse  Min: 74  Max: 93   Resp  Min: 18  Max: 20   SpO2  Min: 96 %  Max: 98 %   No Data Recorded   Weight  Min: 60.9 kg (134 lb 3.2 oz)  Max: 60.9 kg (134 lb 3.2 oz)     Flowsheet Rows      First Filed Value   Admission Height  167.6 cm (66\") Documented at 10/02/2019 1640   Admission Weight  59 kg (130 lb) Documented at 10/02/2019 1640          Objective       Physical Exam:     General Appearance:    Alert, cooperative, in no acute distress   Head:    Normocephalic, without obvious abnormality, atraumatic           Throat:   No oral lesions, no thrush, oral mucosa moist   Neck:   No adenopathy, supple, trachea midline, no thyromegaly, no   carotid bruit, no JVD   Back:     No kyphosis present, no scoliosis present, no skin " lesions,      erythema or scars, no tenderness to percussion or                   palpation,   range of motion normal   Lungs:     Clear to auscultation,respirations regular, even and                  unlabored    Heart:   Regular rhythm.  Bradycardia.  No murmurs.   Chest Wall:    No abnormalities observed   Abdomen:     Normal bowel sounds, no masses, no organomegaly, soft        non-tender, non-distended, no guarding, no rebound                tenderness       Extremities:  No pedal edema          Telemetry:  Normal sinus rhythm.  Heart rate 60s.        Labs  Results from last 7 days   Lab Units 10/03/19  0128 10/02/19  1651   WBC 10*3/mm3 8.45 9.46   HEMOGLOBIN g/dL 11.7* 12.2   HEMATOCRIT % 36.3 38.1   PLATELETS 10*3/mm3 230 234     Results from last 7 days   Lab Units 10/05/19  0408 10/04/19  0429 10/03/19  0128 10/02/19  1651   SODIUM mmol/L  --  138 136 136   POTASSIUM mmol/L 3.7 3.4* 3.5 3.9   CHLORIDE mmol/L  --  101 96* 97*   CO2 mmol/L  --  25.9 26.4 24.9   BUN mg/dL  --  9 12 11   CREATININE mg/dL  --  0.69 0.75 0.84   CALCIUM mg/dL  --  9.3 9.4 9.5   GLUCOSE mg/dL  --  93 131* 138*     Results from last 7 days   Lab Units 10/03/19  0128 10/02/19  1651   BILIRUBIN mg/dL 0.2 0.3   ALK PHOS U/L 62 59   AST (SGOT) U/L 9 10   ALT (SGPT) U/L 6 6     Results from last 7 days   Lab Units 10/05/19  0408 10/04/19  0429 10/03/19  1130 10/02/19  1651   MAGNESIUM mg/dL 1.9 1.7 2.0 1.4*             Results from last 7 days   Lab Units 10/03/19  0128 10/02/19  2015 10/02/19  1651   TROPONIN T ng/mL <0.010 <0.010 <0.010           Radiology: Us Carotid Bilateral    Result Date: 10/3/2019  1. Mild/moderate plaque right greater than left carotid systems. No occlusion. 2. No hemodynamically significant stenosis of either RIGHT or LEFT ICA. 3. Antegrade flow noted both vertebral arteries.  This report was finalized on 10/3/2019 6:42 PM by Dr. Pineda Yousif MD.        Assessment      1.  Orthostatic dizziness secondary to  orthostatic drop in BP.         Autonomic neuropathy cannot be ruled out in this patient with long-standing D  2.  Sinus bradycardia due to medications- metoprolol and clonidine.  Resolved.  3.  Abnormal ECG.  Showing LVH with repolarization abnormality.  Cannot rule out cardiac ischemia.  4.  Previous history of presence of calcium in the coronaries on CT exam  5.  Multiple cardiac risk factors- DM type II, smoking, hypertension and hyperlipidemia  6.  Uncontrolled hypertension    Plan     I think the patient has likely autonomic dysfunction secondary to diabetes.  Her symptoms are consistent mostly with episodes of somewhat random orthostasis.  This is going to take a multifactorial approach to control and as I have discussed with the patient we do not have a therapy that is going to eliminate this problem.    Continue current blood pressure management, may need to increase amlodipine later  I instructed the patient to stop drinking caffeine after her morning coffee as this may be a diuretic and exacerbate orthostatic hypotension.  Additionally she needs to drink increased amounts of water so that she is producing dilute urine to improve her overall blood volume.  Additionally she needs to wear compression stockings to reduce venous pooling and exacerbating orthostasis.  The patient and family understands these recommendations.      Danilo Arriaga MD    10/05/19  12:10 PM

## 2019-10-05 NOTE — PROGRESS NOTES
[facility] HOSPITALIST    PROGRESS NOTE    Name:  Mariluz Jenkins   Age:  74 y.o.  Sex:  female  :  1945  MRN:  [id number]   Visit Number:  [id number]  Admission Date:  10/2/2019  Date Of Service:  10/05/19  Primary Care Physician:  Delfin Mccarty APRN     LOS: 3 days :  Patient Care Team:  Delfin Mccarty APRN as PCP - General (Nurse Practitioner):    Chief Complaint:      Syncope    Subjective / Interval History:     Patient seen and examined at bedside  No adverse events overnight  Patient's daughter was very upset this morning regarding inadequate explanation of testing results I am comforted the patient and her daughter telling them that I could not provide testing results of the stress test as it was unavailable until late yesterday evening when I apologized for the delay in the testing results they were satisfied with my extensive discussion for 30 minutes    Review of Systems:     General ROS: Patient denies any fevers, chills or loss of consciousness.  Respiratory ROS: Denies cough or shortness of breath.  Cardiovascular ROS: Denies chest pain or palpitations. No history of exertional chest pain.  Gastrointestinal ROS: Denies nausea and vomiting. Denies any abdominal pain. No diarrhea.  Neurological ROS: Denies any focal weakness. No loss of consciousness. Denies any numbness. Denies neck pain.  Dermatological ROS: Denies any redness or pruritis.    Vital Signs:    Temp:  [97.5 °F (36.4 °C)-98.4 °F (36.9 °C)] 98.4 °F (36.9 °C)  Heart Rate:  [74-93] 85  Resp:  [18-20] 20  BP: (152-222)/() 210/86    Intake and output:    I/O last 3 completed shifts:  In: 1880 [P.O.:840; I.V.:1040]  Out: 4900 [Urine:4900]  No intake/output data recorded.    Physical Examination:    General Appearance:  Alert and cooperative, not in any acute distress.   Head:  Atraumatic and normocephalic, without obvious abnormality.   Eyes:          PERRLA, conjunctivae and sclerae normal, no  Icterus. No pallor. Extra-occular movements are within normal limits.   Neck: Supple, trachea midline, no thyromegaly, no carotid bruit.   Lungs:   Chest shape is normal. Breath sounds heard bilaterally equally.  No crackles or wheezing. No Pleural rub or bronchial breathing.   Heart:  Normal S1 and S2, no murmur, no gallop, no rub. No JVD   Abdomen:   Normal bowel sounds, no masses, no organomegaly. Soft       non-tender, non-distended, no guarding, no rebound tenderness.   Extremities: Moves all extremities well, no edema, no cyanosis, no            clubbing.   Skin: No bleeding, bruising or rash.   Neurologic: Awake, alert and oriented times 3. Moves all 4 extremities equally.   Laboratory results:    Results from last 7 days   Lab Units 10/05/19  0408 10/04/19  0429 10/03/19  0128 10/02/19  1651   SODIUM mmol/L  --  138 136 136   POTASSIUM mmol/L 3.7 3.4* 3.5 3.9   CHLORIDE mmol/L  --  101 96* 97*   CO2 mmol/L  --  25.9 26.4 24.9   BUN mg/dL  --  9 12 11   CREATININE mg/dL  --  0.69 0.75 0.84   CALCIUM mg/dL  --  9.3 9.4 9.5   BILIRUBIN mg/dL  --   --  0.2 0.3   ALK PHOS U/L  --   --  62 59   ALT (SGPT) U/L  --   --  6 6   AST (SGOT) U/L  --   --  9 10   GLUCOSE mg/dL  --  93 131* 138*     Results from last 7 days   Lab Units 10/03/19  0128 10/02/19  1651   WBC 10*3/mm3 8.45 9.46   HEMOGLOBIN g/dL 11.7* 12.2   HEMATOCRIT % 36.3 38.1   PLATELETS 10*3/mm3 230 234         Results from last 7 days   Lab Units 10/03/19  0128 10/02/19  2015 10/02/19  1651   TROPONIN T ng/mL <0.010 <0.010 <0.010           I have reviewed the patient's laboratory results.    Radiology results:    Imaging Results (last 24 hours)     ** No results found for the last 24 hours. **          I have reviewed the patient's radiology reports.    Medication Review:     I have reviewed the patients active and prn medications.       Symptomatic bradycardia    Hypomagnesemia      Assessment:Plan:    Spent 30 minutes with patient and daughter  explaining the echocardiogram results carotid ultrasound results stress testing results laboratory studies results the patient is very hard of hearing she wears a hearing aid on the right side has wound for the left side but does not use it says over the past 4 years her hearing has been getting worse.  Over the past 6 months the patient has not been participating in activities of daily living at baseline she has poor ambulation and has passed out recently in an outside public business according to the daughter.    After reviewing the work-up for cardiac cause of syncope it appears as though the patient does not have any signs of ischemia secondary to the nuclear stress test her echocardiogram looks good with good ejection fraction with mild filling defect ejection fraction of 61% the ultrasound of the carotid arteries is normal no obstruction of flow in the left and right internal carotid arteries CT of the head however reveals mastoid air cell fluid greater on the right side which is where her hearing is worse.  We will consult ear nose and throat obtain an MRI of the brain with and without contrast likely will not occur till Monday morning due to availability of services we will use some allergy medicines and Sudafed to try and reduce the amount of fluid present in the sinuses order physical therapy occupational therapy to evaluate for disposition at discharge and place the patient on some antiallergy medications.    Although the patient has been a lifelong cigarette smoker she does not require oxygen at home she has not required oxygen while in the hospital I do not think this syncopal causes her due to hypoxemia whatsoever and since the cardiac source has been ruled out this is likely going to be an intracranial source of syncope.      Thyroid testing has proved normal we will test a cortisol level in the morning    Had been receiving Solu-Medrol while in the hospital we will discontinue this as there is no  current indication she is not short of breath not wheezing does not have a COPD exacerbation and is breathing fine saturations are in the mid 90s without oxygen supplementation which is her baseline        1)  Symptomatic bradycardia:  Fall precautions. Orthostatics ordered.  US carotid and echocardiogram ordered.       2)  Near syncope with prior syncopal episodes: Will hydrate and continue fall precautions.  CT head unremarkable. Cardiology consultation placed. Will monitor telemetry. D-dimer has been added. Cardiac diet added.  TSH WNL.      3)  Abnormal EKG with T-wave inversion:  Troponin x3 q6 and telemetry monitoring ordered.  Cardiology consultation placed.      4)  Hypomagnesemia: Electrolyte replacement added per protocol.      5)  Uncontrolled hypertension, improved:  IV Hydralazine 10mg ordered for SBP>160mmHg.      6)  Diabetes mellitus type 2:  FSBG ACHS.  SSI PRN.  Hemoglobin a1c added. She is on Metformin at home.      Anticipate discharge Monday after further testing is completed      Kaiden Rausch DO  10/05/19  10:27 AM

## 2019-10-06 LAB
CORTIS SERPL-MCNC: 2.39 MCG/DL
GLUCOSE BLDC GLUCOMTR-MCNC: 110 MG/DL (ref 70–130)
GLUCOSE BLDC GLUCOMTR-MCNC: 111 MG/DL (ref 70–130)
GLUCOSE BLDC GLUCOMTR-MCNC: 95 MG/DL (ref 70–130)
GLUCOSE BLDC GLUCOMTR-MCNC: 98 MG/DL (ref 70–130)

## 2019-10-06 PROCEDURE — 99232 SBSQ HOSP IP/OBS MODERATE 35: CPT | Performed by: FAMILY MEDICINE

## 2019-10-06 PROCEDURE — 99232 SBSQ HOSP IP/OBS MODERATE 35: CPT | Performed by: INTERNAL MEDICINE

## 2019-10-06 PROCEDURE — 25010000002 HEPARIN (PORCINE) PER 1000 UNITS: Performed by: HOSPITALIST

## 2019-10-06 PROCEDURE — 82962 GLUCOSE BLOOD TEST: CPT

## 2019-10-06 PROCEDURE — 82533 TOTAL CORTISOL: CPT | Performed by: FAMILY MEDICINE

## 2019-10-06 PROCEDURE — 25010000002 HYDRALAZINE PER 20 MG: Performed by: PHYSICIAN ASSISTANT

## 2019-10-06 PROCEDURE — 94799 UNLISTED PULMONARY SVC/PX: CPT

## 2019-10-06 RX ORDER — SCOLOPAMINE TRANSDERMAL SYSTEM 1 MG/1
1 PATCH, EXTENDED RELEASE TRANSDERMAL
Status: DISCONTINUED | OUTPATIENT
Start: 2019-10-06 | End: 2019-10-11 | Stop reason: HOSPADM

## 2019-10-06 RX ORDER — AMLODIPINE BESYLATE 5 MG/1
5 TABLET ORAL
Status: DISCONTINUED | OUTPATIENT
Start: 2019-10-07 | End: 2019-10-07

## 2019-10-06 RX ADMIN — SODIUM CHLORIDE, PRESERVATIVE FREE 10 ML: 5 INJECTION INTRAVENOUS at 08:31

## 2019-10-06 RX ADMIN — MAGNESIUM GLUCONATE 500 MG ORAL TABLET 400 MG: 500 TABLET ORAL at 08:30

## 2019-10-06 RX ADMIN — LOSARTAN POTASSIUM 100 MG: 50 TABLET, FILM COATED ORAL at 20:47

## 2019-10-06 RX ADMIN — HYDRALAZINE HYDROCHLORIDE 10 MG: 20 INJECTION INTRAMUSCULAR; INTRAVENOUS at 05:45

## 2019-10-06 RX ADMIN — ATORVASTATIN CALCIUM 20 MG: 20 TABLET, FILM COATED ORAL at 20:47

## 2019-10-06 RX ADMIN — HEPARIN SODIUM 5000 UNITS: 5000 INJECTION INTRAVENOUS; SUBCUTANEOUS at 20:47

## 2019-10-06 RX ADMIN — METFORMIN HYDROCHLORIDE 1000 MG: 500 TABLET ORAL at 08:30

## 2019-10-06 RX ADMIN — HEPARIN SODIUM 5000 UNITS: 5000 INJECTION INTRAVENOUS; SUBCUTANEOUS at 08:31

## 2019-10-06 RX ADMIN — FAMOTIDINE 20 MG: 20 TABLET, FILM COATED ORAL at 08:30

## 2019-10-06 RX ADMIN — BUSPIRONE HYDROCHLORIDE 5 MG: 5 TABLET ORAL at 08:31

## 2019-10-06 RX ADMIN — SODIUM CHLORIDE, PRESERVATIVE FREE 10 ML: 5 INJECTION INTRAVENOUS at 20:47

## 2019-10-06 RX ADMIN — TRAZODONE HYDROCHLORIDE 50 MG: 50 TABLET ORAL at 20:47

## 2019-10-06 RX ADMIN — FAMOTIDINE 20 MG: 20 TABLET, FILM COATED ORAL at 16:57

## 2019-10-06 RX ADMIN — AMLODIPINE BESYLATE 2.5 MG: 5 TABLET ORAL at 08:31

## 2019-10-06 RX ADMIN — PSEUDOEPHEDRINE HCL 60 MG: 30 TABLET, FILM COATED ORAL at 08:31

## 2019-10-06 RX ADMIN — PSEUDOEPHEDRINE HCL 60 MG: 30 TABLET, FILM COATED ORAL at 20:47

## 2019-10-06 RX ADMIN — METFORMIN HYDROCHLORIDE 1000 MG: 500 TABLET ORAL at 16:57

## 2019-10-06 RX ADMIN — PAROXETINE HYDROCHLORIDE 20 MG: 20 TABLET, FILM COATED ORAL at 08:31

## 2019-10-06 RX ADMIN — ASPIRIN 81 MG: 81 TABLET, COATED ORAL at 08:31

## 2019-10-06 RX ADMIN — SCOPALAMINE 1 PATCH: 1 PATCH, EXTENDED RELEASE TRANSDERMAL at 16:58

## 2019-10-06 RX ADMIN — PSEUDOEPHEDRINE HCL 60 MG: 30 TABLET, FILM COATED ORAL at 16:57

## 2019-10-06 RX ADMIN — CETIRIZINE HYDROCHLORIDE 5 MG: 10 TABLET, FILM COATED ORAL at 08:31

## 2019-10-06 RX ADMIN — BUSPIRONE HYDROCHLORIDE 5 MG: 5 TABLET ORAL at 20:47

## 2019-10-06 NOTE — PROGRESS NOTES
"  Patient Identification:  Name:  Mariluz Jenkins  Age:  74 y.o.  Sex:  female  :  1945  MRN:  [id number]  Visit Number:  [id number]  Primary care provider:  Delfin Mccarty APRN    Reason for follow-up:  Orthostatic dizziness secondary to orthostatic drop in BP.  Bradycardia-resolved.    Subjective     Patient has a mild headache.  No hypotension since yesterday, systolic hypertension fairly consistent      Medication Review:     [START ON 10/7/2019] amLODIPine 5 mg Oral Q24H   aspirin 81 mg Oral Daily   atorvastatin 20 mg Oral Nightly   busPIRone 5 mg Oral BID   cetirizine 5 mg Oral Daily   famotidine 20 mg Oral BID AC   heparin (porcine) 5,000 Units Subcutaneous Q12H   insulin aspart 0-7 Units Subcutaneous 4x Daily AC & at Bedtime   losartan 100 mg Oral Nightly   magnesium oxide 400 mg Oral Daily   metFORMIN 1,000 mg Oral BID With Meals   PARoxetine 20 mg Oral Daily   pseudoephedrine 60 mg Oral TID   sodium chloride 10 mL Intravenous Q12H   traZODone 50 mg Oral Nightly         Vital Sign Min/Max for last 24 hours  Temp  Min: 98.1 °F (36.7 °C)  Max: 98.6 °F (37 °C)   BP  Min: 143/62  Max: 201/81   Pulse  Min: 83  Max: 91   Resp  Min: 20  Max: 20   SpO2  Min: 96 %  Max: 99 %   No Data Recorded   Weight  Min: 61 kg (134 lb 8 oz)  Max: 61 kg (134 lb 8 oz)     Flowsheet Rows      First Filed Value   Admission Height  167.6 cm (66\") Documented at 10/02/2019 1640   Admission Weight  59 kg (130 lb) Documented at 10/02/2019 1640          Objective       Physical Exam:     General Appearance:    Alert, cooperative, in no acute distress   Head:    Normocephalic, without obvious abnormality, atraumatic           Throat:   No oral lesions, no thrush, oral mucosa moist   Neck:   No adenopathy, supple, trachea midline, no thyromegaly, no   carotid bruit, no JVD   Back:     No kyphosis present, no scoliosis present, no skin lesions,      erythema or scars, no tenderness to percussion or                   " palpation,   range of motion normal   Lungs:     Clear to auscultation,respirations regular, even and                  unlabored    Heart:   Regular rhythm.  Bradycardia.  No murmurs.   Chest Wall:    No abnormalities observed   Abdomen:     Normal bowel sounds, no masses, no organomegaly, soft        non-tender, non-distended, no guarding, no rebound                tenderness       Extremities:  No pedal edema          Telemetry:  Normal sinus rhythm.  Heart rate 60s.        Labs  Results from last 7 days   Lab Units 10/03/19  0128 10/02/19  1651   WBC 10*3/mm3 8.45 9.46   HEMOGLOBIN g/dL 11.7* 12.2   HEMATOCRIT % 36.3 38.1   PLATELETS 10*3/mm3 230 234     Results from last 7 days   Lab Units 10/05/19  0408 10/04/19  0429 10/03/19  0128 10/02/19  1651   SODIUM mmol/L  --  138 136 136   POTASSIUM mmol/L 3.7 3.4* 3.5 3.9   CHLORIDE mmol/L  --  101 96* 97*   CO2 mmol/L  --  25.9 26.4 24.9   BUN mg/dL  --  9 12 11   CREATININE mg/dL  --  0.69 0.75 0.84   CALCIUM mg/dL  --  9.3 9.4 9.5   GLUCOSE mg/dL  --  93 131* 138*     Results from last 7 days   Lab Units 10/03/19  0128 10/02/19  1651   BILIRUBIN mg/dL 0.2 0.3   ALK PHOS U/L 62 59   AST (SGOT) U/L 9 10   ALT (SGPT) U/L 6 6     Results from last 7 days   Lab Units 10/05/19  0408 10/04/19  0429 10/03/19  1130 10/02/19  1651   MAGNESIUM mg/dL 1.9 1.7 2.0 1.4*             Results from last 7 days   Lab Units 10/03/19  0128 10/02/19  2015 10/02/19  1651   TROPONIN T ng/mL <0.010 <0.010 <0.010           Radiology: No radiology results for the last day    Assessment      1.  Orthostatic dizziness secondary to orthostatic drop in BP.         Autonomic neuropathy cannot be ruled out in this patient with long-standing D  2.  Sinus bradycardia due to medications- metoprolol and clonidine.  Resolved.  3.  Abnormal ECG.  Showing LVH with repolarization abnormality.  Cannot rule out cardiac ischemia.  4.  Previous history of presence of calcium in the coronaries on CT exam  5.   Multiple cardiac risk factors- DM type II, smoking, hypertension and hyperlipidemia  6.  Uncontrolled hypertension    Plan     I think the patient has likely autonomic dysfunction secondary to diabetes.  Her symptoms are consistent mostly with episodes of somewhat random orthostasis.  This is going to take a multifactorial approach to control and as I have discussed with the patient we do not have a therapy that is going to eliminate this problem.    Increase Norvasc to 5 mg daily    I recommend not using clonidine any further in this patient    Increase water intake to support venous volume and reduce chance of orthostasis    I ordered compression stockings but they do not place these actually in the hospital anymore, this will need to be ordered on her outpatient orders      Danilo Arriaga MD    10/06/19  10:10 AM

## 2019-10-06 NOTE — PLAN OF CARE
Problem: Patient Care Overview  Goal: Plan of Care Review  Outcome: Ongoing (interventions implemented as appropriate)      Problem: Fall Risk (Adult)  Goal: Identify Related Risk Factors and Signs and Symptoms  Outcome: Outcome(s) achieved Date Met: 10/06/19    Goal: Absence of Fall  Outcome: Ongoing (interventions implemented as appropriate)      Problem: Skin Injury Risk (Adult)  Goal: Identify Related Risk Factors and Signs and Symptoms  Outcome: Outcome(s) achieved Date Met: 10/06/19    Goal: Skin Health and Integrity  Outcome: Ongoing (interventions implemented as appropriate)      Problem: Diabetes, Type 2 (Adult)  Goal: Signs and Symptoms of Listed Potential Problems Will be Absent, Minimized or Managed (Diabetes, Type 2)  Outcome: Outcome(s) achieved Date Met: 10/06/19      Problem: Arrhythmia/Dysrhythmia (Symptomatic) (Adult)  Goal: Signs and Symptoms of Listed Potential Problems Will be Absent, Minimized or Managed (Arrhythmia/Dysrhythmia)  Outcome: Outcome(s) achieved Date Met: 10/06/19

## 2019-10-06 NOTE — PROGRESS NOTES
[facility] HOSPITALIST    PROGRESS NOTE    Name:  Mariluz Jenkins   Age:  74 y.o.  Sex:  female  :  1945  MRN:  [id number]   Visit Number:  [id number]  Admission Date:  10/2/2019  Date Of Service:  10/06/19  Primary Care Physician:  Delfin Mccarty APRN     LOS: 4 days :  Patient Care Team:  Delfin Mccarty APRN as PCP - General (Nurse Practitioner):    Chief Complaint:      Balance problems/falls  Syncope  ADLs diminished well below baseline      Subjective / Interval History:     Patient seen and examined at bedside  No adverse events overnight  Daughter present daily and asks lots of appropriate questions  Patient complains of dizziness and nausea with movement    Review of Systems:     General ROS: Patient denies any fevers, chills does endorse loss of consciousness.  Respiratory ROS: Denies cough or shortness of breath.  Cardiovascular ROS: Denies chest pain or palpitations. No history of exertional chest pain.  Gastrointestinal ROS: Denies nausea and vomiting. Denies any abdominal pain. No diarrhea.  Neurological ROS: Denies any focal weakness. No loss of consciousness. Denies any numbness. Denies neck pain.  Dermatological ROS: Denies any redness or pruritis.    Vital Signs:    Temp:  [98.1 °F (36.7 °C)-98.6 °F (37 °C)] 98.4 °F (36.9 °C)  Heart Rate:  [] 109  Resp:  [20] 20  BP: (137-201)/() 137/80    Intake and output:    I/O last 3 completed shifts:  In: 1760 [P.O.:720; I.V.:1040]  Out: 5750 [Urine:5750]  I/O this shift:  In: 240 [P.O.:240]  Out: -     Physical Examination:    General Appearance:  Alert and cooperative, not in any acute distress.   Head:  Atraumatic and normocephalic, without obvious abnormality. Otoscope exam right ear reveals dark black fluid vs clot vs melanoma behind tympanum  CT HEAD mastoid fluid    Eyes:          PERRLA, conjunctivae and sclerae normal, no Icterus. No pallor. Extra-occular movements are within normal limits.   Neck:  Supple, trachea midline, no thyromegaly, no carotid bruit.   Lungs:   Chest shape is normal. Breath sounds heard bilaterally equally.  No crackles or wheezing. No Pleural rub or bronchial breathing.   Heart:  Normal S1 and S2, no murmur, no gallop, no rub. No JVD   Abdomen:   Normal bowel sounds, no masses, no organomegaly. Soft       non-tender, non-distended, no guarding, no rebound tenderness.   Extremities: Moves all extremities well, no edema, no cyanosis, no            clubbing.   Skin: No bleeding, bruising or rash.   Neurologic: Awake, alert and oriented times 3. Moves all 4 extremities equally.  + Romberg   Laboratory results:    Results from last 7 days   Lab Units 10/05/19  0408 10/04/19  0429 10/03/19  0128 10/02/19  1651   SODIUM mmol/L  --  138 136 136   POTASSIUM mmol/L 3.7 3.4* 3.5 3.9   CHLORIDE mmol/L  --  101 96* 97*   CO2 mmol/L  --  25.9 26.4 24.9   BUN mg/dL  --  9 12 11   CREATININE mg/dL  --  0.69 0.75 0.84   CALCIUM mg/dL  --  9.3 9.4 9.5   BILIRUBIN mg/dL  --   --  0.2 0.3   ALK PHOS U/L  --   --  62 59   ALT (SGPT) U/L  --   --  6 6   AST (SGOT) U/L  --   --  9 10   GLUCOSE mg/dL  --  93 131* 138*     Results from last 7 days   Lab Units 10/03/19  0128 10/02/19  1651   WBC 10*3/mm3 8.45 9.46   HEMOGLOBIN g/dL 11.7* 12.2   HEMATOCRIT % 36.3 38.1   PLATELETS 10*3/mm3 230 234         Results from last 7 days   Lab Units 10/03/19  0128 10/02/19  2015 10/02/19  1651   TROPONIN T ng/mL <0.010 <0.010 <0.010           I have reviewed the patient's laboratory results.    Radiology results:    Imaging Results (last 24 hours)     ** No results found for the last 24 hours. **          I have reviewed the patient's radiology reports.    Medication Review:     I have reviewed the patients active and prn medications.       Symptomatic bradycardia    Hypomagnesemia      Assessment:   Plan:    Otoscope exam reveals black dark fluid vs clot behind the tympanum right side  Could help explain her  "symptoms  She gets \"carsickness\" often from simple movements  CT HEAD reveals mastoid fluid more prominent on the right side  Romberg + (nearly fell, got sick, needed to lie down)  ENT consult for the morning at 0700  MRI BRAIN with and without ordered but let ENT see her first   For now scopolamine patch      Spent 30 minutes with patient and daughter explaining the echocardiogram results carotid ultrasound results stress testing results laboratory studies results the patient is very hard of hearing she wears a hearing aid on the right side has wound for the left side but does not use it says over the past 4 years her hearing has been getting worse.  Over the past 6 months the patient has not been participating in activities of daily living at baseline she has poor ambulation and has passed out recently in an outside public business according to the daughter.     After reviewing the work-up for cardiac cause of syncope it appears as though the patient does not have any signs of ischemia secondary to the nuclear stress test her echocardiogram looks good with good ejection fraction with mild filling defect ejection fraction of 61% the ultrasound of the carotid arteries is normal no obstruction of flow in the left and right internal carotid arteries CT of the head however reveals mastoid air cell fluid greater on the right side which is where her hearing is worse.  We will consult ear nose and throat obtain an MRI of the brain with and without contrast likely will not occur till Monday morning due to availability of services we will use some allergy medicines and Sudafed to try and reduce the amount of fluid present in the sinuses order physical therapy occupational therapy to evaluate for disposition at discharge and place the patient on some antiallergy medications.     Although the patient has been a lifelong cigarette smoker she does not require oxygen at home she has not required oxygen while in the hospital I do " not think this syncopal causes her due to hypoxemia whatsoever and since the cardiac source has been ruled out this is likely going to be an intracranial source of syncope.       Thyroid testing has proved normal we will test a cortisol level in the morning     Had been receiving Solu-Medrol while in the hospital we will discontinue this as there is no current indication she is not short of breath not wheezing does not have a COPD exacerbation and is breathing fine saturations are in the mid 90s without oxygen supplementation which is her baseline           1)  Symptomatic bradycardia:  Fall precautions. Orthostatics ordered.  US carotid and echocardiogram ordered.        2)  Near syncope with prior syncopal episodes: Will hydrate and continue fall precautions.  CT head unremarkable. Cardiology consultation placed. Will monitor telemetry. D-dimer has been added. Cardiac diet added.  TSH WNL.      3)  Abnormal EKG with T-wave inversion:  Troponin x3 q6 and telemetry monitoring ordered.  Cardiology consultation placed.      4)  Hypomagnesemia: Electrolyte replacement added per protocol.      5)  Uncontrolled hypertension, improved:  IV Hydralazine 10mg ordered for SBP>160mmHg.      6)  Diabetes mellitus type 2:  FSBG ACHS.  SSI PRN.  Hemoglobin a1c added. She is on Metformin at home.           Kaiden Rausch DO  10/06/19  11:50 AM

## 2019-10-07 ENCOUNTER — APPOINTMENT (OUTPATIENT)
Dept: MRI IMAGING | Facility: HOSPITAL | Age: 74
End: 2019-10-07

## 2019-10-07 LAB
GLUCOSE BLDC GLUCOMTR-MCNC: 100 MG/DL (ref 70–130)
GLUCOSE BLDC GLUCOMTR-MCNC: 107 MG/DL (ref 70–130)
GLUCOSE BLDC GLUCOMTR-MCNC: 87 MG/DL (ref 70–130)
GLUCOSE BLDC GLUCOMTR-MCNC: 99 MG/DL (ref 70–130)
TROPONIN T SERPL-MCNC: <0.01 NG/ML (ref 0–0.03)
TROPONIN T SERPL-MCNC: <0.01 NG/ML (ref 0–0.03)

## 2019-10-07 PROCEDURE — 93010 ELECTROCARDIOGRAM REPORT: CPT | Performed by: INTERNAL MEDICINE

## 2019-10-07 PROCEDURE — 0 GADOBENATE DIMEGLUMINE 529 MG/ML SOLUTION: Performed by: INTERNAL MEDICINE

## 2019-10-07 PROCEDURE — A9577 INJ MULTIHANCE: HCPCS | Performed by: INTERNAL MEDICINE

## 2019-10-07 PROCEDURE — 84484 ASSAY OF TROPONIN QUANT: CPT | Performed by: INTERNAL MEDICINE

## 2019-10-07 PROCEDURE — 97166 OT EVAL MOD COMPLEX 45 MIN: CPT

## 2019-10-07 PROCEDURE — 99233 SBSQ HOSP IP/OBS HIGH 50: CPT | Performed by: INTERNAL MEDICINE

## 2019-10-07 PROCEDURE — 70553 MRI BRAIN STEM W/O & W/DYE: CPT

## 2019-10-07 PROCEDURE — 70553 MRI BRAIN STEM W/O & W/DYE: CPT | Performed by: RADIOLOGY

## 2019-10-07 PROCEDURE — 93005 ELECTROCARDIOGRAM TRACING: CPT | Performed by: INTERNAL MEDICINE

## 2019-10-07 PROCEDURE — 82962 GLUCOSE BLOOD TEST: CPT

## 2019-10-07 PROCEDURE — 25010000002 HYDRALAZINE PER 20 MG: Performed by: PHYSICIAN ASSISTANT

## 2019-10-07 PROCEDURE — 25010000002 HEPARIN (PORCINE) PER 1000 UNITS: Performed by: HOSPITALIST

## 2019-10-07 PROCEDURE — G0108 DIAB MANAGE TRN  PER INDIV: HCPCS

## 2019-10-07 PROCEDURE — 25010000002 LORAZEPAM PER 2 MG: Performed by: INTERNAL MEDICINE

## 2019-10-07 RX ORDER — AMLODIPINE BESYLATE 10 MG/1
10 TABLET ORAL
Status: DISCONTINUED | OUTPATIENT
Start: 2019-10-08 | End: 2019-10-11 | Stop reason: HOSPADM

## 2019-10-07 RX ORDER — CARVEDILOL 6.25 MG/1
6.25 TABLET ORAL 2 TIMES DAILY WITH MEALS
Status: DISCONTINUED | OUTPATIENT
Start: 2019-10-07 | End: 2019-10-10

## 2019-10-07 RX ORDER — CLONIDINE HYDROCHLORIDE 0.2 MG/1
0.2 TABLET ORAL EVERY 8 HOURS SCHEDULED
Status: DISCONTINUED | OUTPATIENT
Start: 2019-10-07 | End: 2019-10-08

## 2019-10-07 RX ORDER — CLONIDINE 0.2 MG/24H
1 PATCH, EXTENDED RELEASE TRANSDERMAL WEEKLY
Status: DISCONTINUED | OUTPATIENT
Start: 2019-10-07 | End: 2019-10-07

## 2019-10-07 RX ORDER — LORAZEPAM 2 MG/ML
1 INJECTION INTRAMUSCULAR ONCE
Status: COMPLETED | OUTPATIENT
Start: 2019-10-07 | End: 2019-10-07

## 2019-10-07 RX ADMIN — MAGNESIUM GLUCONATE 500 MG ORAL TABLET 400 MG: 500 TABLET ORAL at 09:05

## 2019-10-07 RX ADMIN — METFORMIN HYDROCHLORIDE 1000 MG: 500 TABLET ORAL at 16:01

## 2019-10-07 RX ADMIN — LOSARTAN POTASSIUM 100 MG: 50 TABLET, FILM COATED ORAL at 22:48

## 2019-10-07 RX ADMIN — AMLODIPINE BESYLATE 5 MG: 5 TABLET ORAL at 09:04

## 2019-10-07 RX ADMIN — HEPARIN SODIUM 5000 UNITS: 5000 INJECTION INTRAVENOUS; SUBCUTANEOUS at 20:10

## 2019-10-07 RX ADMIN — FAMOTIDINE 20 MG: 20 TABLET, FILM COATED ORAL at 16:01

## 2019-10-07 RX ADMIN — FAMOTIDINE 20 MG: 20 TABLET, FILM COATED ORAL at 09:05

## 2019-10-07 RX ADMIN — ASPIRIN 81 MG: 81 TABLET, COATED ORAL at 09:04

## 2019-10-07 RX ADMIN — CLONIDINE HYDROCHLORIDE 0.2 MG: 0.2 TABLET ORAL at 11:20

## 2019-10-07 RX ADMIN — CLONIDINE HYDROCHLORIDE 0.2 MG: 0.2 TABLET ORAL at 20:10

## 2019-10-07 RX ADMIN — TRAZODONE HYDROCHLORIDE 50 MG: 50 TABLET ORAL at 20:10

## 2019-10-07 RX ADMIN — PAROXETINE HYDROCHLORIDE 20 MG: 20 TABLET, FILM COATED ORAL at 09:04

## 2019-10-07 RX ADMIN — BUSPIRONE HYDROCHLORIDE 5 MG: 5 TABLET ORAL at 09:04

## 2019-10-07 RX ADMIN — GADOBENATE DIMEGLUMINE 12 ML: 529 INJECTION, SOLUTION INTRAVENOUS at 13:00

## 2019-10-07 RX ADMIN — METFORMIN HYDROCHLORIDE 1000 MG: 500 TABLET ORAL at 09:04

## 2019-10-07 RX ADMIN — CETIRIZINE HYDROCHLORIDE 5 MG: 10 TABLET, FILM COATED ORAL at 09:04

## 2019-10-07 RX ADMIN — CARVEDILOL 6.25 MG: 6.25 TABLET, FILM COATED ORAL at 13:40

## 2019-10-07 RX ADMIN — BUSPIRONE HYDROCHLORIDE 5 MG: 5 TABLET ORAL at 20:10

## 2019-10-07 RX ADMIN — LORAZEPAM 1 MG: 2 INJECTION INTRAMUSCULAR; INTRAVENOUS at 12:05

## 2019-10-07 RX ADMIN — HYDRALAZINE HYDROCHLORIDE 10 MG: 20 INJECTION INTRAMUSCULAR; INTRAVENOUS at 06:45

## 2019-10-07 RX ADMIN — SODIUM CHLORIDE, PRESERVATIVE FREE 10 ML: 5 INJECTION INTRAVENOUS at 09:05

## 2019-10-07 RX ADMIN — HEPARIN SODIUM 5000 UNITS: 5000 INJECTION INTRAVENOUS; SUBCUTANEOUS at 09:05

## 2019-10-07 RX ADMIN — ATORVASTATIN CALCIUM 20 MG: 20 TABLET, FILM COATED ORAL at 20:10

## 2019-10-07 NOTE — CONSULTS
Patient is a well controlled type 2 Diabetic.  Current A1C is 5.9.  Counseled patient on diabetes basic handout which discusses exactly what diabetes is and how it affects the body.  Counseled patient on complications of hyperglycemia and ways to prevent it.  Counseled patient on hypoglycemia and treatment by using the rule of 15.  Counseled patient on the importance of checking blood glucose levels frequently and keeping a log to take to all MD appointments.  Counseled patient on how to care for themselves during sick day.  Stressed the importance of healthy eating with a limit of carbohydrates to 180 per day.  Counseled on importance of complying with medications as ordered by provider.  Counseled on the importance of daily foot inspections.  Counseled patient to seek medical attention if blood glucose above 300.  Counseled on importance of daily exercise. Patient verbalizes understanding of all information given. Encouraged patient to attend an outpatient diabetes smart class or attend diabetes support  group.  Left time and dates of classes with patient along with my name and contact information, will continue to monitor patient as needed.

## 2019-10-07 NOTE — PLAN OF CARE
Problem: Patient Care Overview  Goal: Plan of Care Review  Outcome: Ongoing (interventions implemented as appropriate)    Goal: Discharge Needs Assessment  Outcome: Ongoing (interventions implemented as appropriate)    Goal: Interprofessional Rounds/Family Conf  Outcome: Ongoing (interventions implemented as appropriate)      Problem: Fall Risk (Adult)  Goal: Absence of Fall  Outcome: Ongoing (interventions implemented as appropriate)      Problem: Skin Injury Risk (Adult)  Goal: Skin Health and Integrity  Outcome: Ongoing (interventions implemented as appropriate)

## 2019-10-07 NOTE — PROGRESS NOTES
HOSPITALIST PROGRESS NOTE    Patient Identification:  Name:  Mariluz Jenkins  Age:  74 y.o.  Sex:  female  :  1945  MRN:  [id number]  Visit Number:  [id number]  Primary Care Provider:  Delfin Mccarty APRN    Length of stay:  5     HPI: 74 to female being seen in follow up for diziness and near syncope    Subjective:  The patient was seen this evening and she is resting in bed.  The patient states that she currently is without dizziness.  The patient reports that she had nausea earlier today but she thinks that this was related to her anxiety prior to her MRI.  The patient states that she has not had any syncopal events.  She does not report any dyspnea, potation's and/or chest pains.  No vomiting.  Patient states that she has been ambulatory in her room.    Has been hypertensive today.  The patient also had an episode of sinus tachycardia in the 140s per nursing report.    Present during exam: PRASHANTH Appiah and the patient's daughter    Current Hospital Meds:    [START ON 10/8/2019] amLODIPine 10 mg Oral Q24H   aspirin 81 mg Oral Daily   atorvastatin 20 mg Oral Nightly   busPIRone 5 mg Oral BID   carvedilol 6.25 mg Oral BID With Meals   cetirizine 5 mg Oral Daily   cloNIDine 0.2 mg Oral Q8H   famotidine 20 mg Oral BID AC   heparin (porcine) 5,000 Units Subcutaneous Q12H   insulin aspart 0-7 Units Subcutaneous 4x Daily AC & at Bedtime   losartan 100 mg Oral Nightly   magnesium oxide 400 mg Oral Daily   metFORMIN 1,000 mg Oral BID With Meals   PARoxetine 20 mg Oral Daily   Scopolamine 1 patch Transdermal Q72H   sodium chloride 10 mL Intravenous Q12H   traZODone 50 mg Oral Nightly       sodium chloride 75 mL/hr Last Rate: 75 mL/hr (10/05/19 0238)       Vital Signs  Temp:  [97.6 °F (36.4 °C)-98.3 °F (36.8 °C)] 97.6 °F (36.4 °C)  Heart Rate:  [82-96] 82  Resp:  [18-20] 18  BP: (143-212)/(84-96) 143/84      10/05/19  0510 10/06/19  0545 10/07/19  0500   Weight: 60.9 kg (134 lb 3.2 oz) 61 kg (134 lb 8 oz)  61.1 kg (134 lb 12.8 oz)     Body mass index is 21.76 kg/m².    Physical exam:  Physical Exam   Constitutional: She is oriented to person, place, and time. She appears well-developed and well-nourished. No distress.   HENT:   Head: Normocephalic and atraumatic.   Right Ear: There is mastoid tenderness. Decreased hearing is noted.   Left Ear: Decreased hearing is noted.   Mouth/Throat: Oropharynx is clear and moist.   Eyes: Conjunctivae and EOM are normal. Pupils are equal, round, and reactive to light.   Neck: Neck supple. No tracheal deviation present. No thyromegaly present.   Cardiovascular: Normal rate and regular rhythm. Exam reveals no gallop and no friction rub.   No murmur heard.  Pulmonary/Chest: Breath sounds normal. No respiratory distress. She has no wheezes. She has no rales.   Abdominal: Soft. Bowel sounds are normal. She exhibits no distension. There is no hepatomegaly. There is no tenderness. There is no guarding.   Musculoskeletal: Normal range of motion. She exhibits no tenderness.   Neurological: She is alert and oriented to person, place, and time. No cranial nerve deficit.   Skin: Skin is warm and dry. No rash noted. No erythema.   Psychiatric: She has a normal mood and affect.     Results Review:    Telemetry reviewed: Sinus rhythm with T wave inversion, (previously noted), current heart rate 70    Results from last 7 days   Lab Units 10/03/19  0128 10/02/19  1651   WBC 10*3/mm3 8.45 9.46   HEMOGLOBIN g/dL 11.7* 12.2   HEMATOCRIT % 36.3 38.1   PLATELETS 10*3/mm3 230 234     Results from last 7 days   Lab Units 10/05/19  0408 10/04/19  0429 10/03/19  0128 10/02/19  1651   SODIUM mmol/L  --  138 136 136   POTASSIUM mmol/L 3.7 3.4* 3.5 3.9   CHLORIDE mmol/L  --  101 96* 97*   CO2 mmol/L  --  25.9 26.4 24.9   BUN mg/dL  --  9 12 11   CREATININE mg/dL  --  0.69 0.75 0.84   CALCIUM mg/dL  --  9.3 9.4 9.5   GLUCOSE mg/dL  --  93 131* 138*     Results from last 7 days   Lab Units 10/03/19  0128  10/02/19  1651   BILIRUBIN mg/dL 0.2 0.3   ALK PHOS U/L 62 59   AST (SGOT) U/L 9 10   ALT (SGPT) U/L 6 6     Results from last 7 days   Lab Units 10/05/19  0408 10/04/19  0429 10/03/19  1130 10/02/19  1651   MAGNESIUM mg/dL 1.9 1.7 2.0 1.4*         Results from last 7 days   Lab Units 10/07/19  1157 10/03/19  0128 10/02/19  2015   TROPONIN T ng/mL <0.010 <0.010 <0.010     MRI brain with and without contrast:  MRI FINDINGS: Exam is technically limited. There is a moderate amount of  motion artifact on the study. The ventricular system and the  subarachnoid spaces show changes of cerebral atrophy. There is no mass  effect or midline shift in the brain. The diffusion-weighted scans show  no recent areas of ischemia. There is no evidence of subdural or  epidural hematoma. There is some gliosis throughout the white matter. On  the post contrasted scans, there were no areas of breakdown of  blood-brain barrier or abnormal enhancement within the brain parenchyma.  There is increased signal indicating inflammation in the mastoid air  cells of the temporal bone bilaterally, but more prominent on the right.     IMPRESSION:  Technically limited exam due to motion artifact. There were  no recent areas of ischemia. There were no focal brain parenchymal  abnormalities. There were inflammatory changes in the mastoid air cells  bilaterally but more prominent on the right.      Assessment/Plan     -Symptomatic bradycardia with near syncope in the setting of bilateral inflammatory changes of the mastoid air cells, right greater than left with mastoid tenderness upon palpation: Continue with fall precautions.  I have requested repeat orthostatic vital signs.  Her carotid ultrasound and echocardiogram results have been reviewed.  I discussed with the patient and her daughter regarding the MRI results.  The patient will hopefully be seen by ENT tomorrow as they were not present in the office today.  Continue with her scopolamine patch,  pseudoephedrine (for now while monitoring blood pressure) and cetirizine.    -Uncontrolled essential hypertension with an episode of tachycardia that may be rebound from holding her clonidine as patient presented with bradycardia: Cardiology is following and has resumed the patient's clonidine.  Also started her on carvedilol in addition to amlodipine.    -Abnormal EKG with T wave inversion: Patient has had several negative troponin T levels.  Patient had a stress test that revealed a normal ejection fraction, normal wall motion abnormality and no evidence of ischemia.  Continue to monitor on telemetry for now.    -Diabetes mellitus type II that is non-insulin-dependent with hemoglobin A1c 5.9%: Given patient's normoglycemia and the propensity for oral glycemic agents to cause prolonged hypoglycemia, will hold metformin for now.  Patient has as needed sliding scale insulin coverage.    The patient is high risk due to the following diagnoses/reasons: Presyncope of unclear etiology with MRI findings revealing inflammatory changes of the bilateral mastoid air cells, normal EKG and uncontrolled hypertension.    I discussed the patients findings and my recommendations with patient, family and nursing staff.        Disposition  Hopefully home in 1-2 days pending ENT evaluation and BP/hR readings.      Ekta Odom DO  10/07/19  4:46 PM

## 2019-10-07 NOTE — PROGRESS NOTES
"  Patient Identification:  Name:  Mariluz Jenkins  Age:  74 y.o.  Sex:  female  :  1945  MRN:  [id number]  Visit Number:  [id number]  Primary care provider:  Delfin Mccarty APRN    Reason for follow-up:  Uncontrolled hypertension.  Orthostatic dizziness due to orthostatic drop in BP.    Subjective     Orthostatic dizziness has improved to some extent.  Hypertension is still uncontrolled.  No history of chest pain or angina.  Dyspnea on exertion-stable.  Telemetry-sinus rhythm.  Nuclear stress test showed no evidence of ischemia.      Medication Review:     [START ON 10/8/2019] amLODIPine 10 mg Oral Q24H   aspirin 81 mg Oral Daily   atorvastatin 20 mg Oral Nightly   busPIRone 5 mg Oral BID   cetirizine 5 mg Oral Daily   cloNIDine 1 patch Transdermal Weekly   famotidine 20 mg Oral BID AC   heparin (porcine) 5,000 Units Subcutaneous Q12H   insulin aspart 0-7 Units Subcutaneous 4x Daily AC & at Bedtime   losartan 100 mg Oral Nightly   magnesium oxide 400 mg Oral Daily   metFORMIN 1,000 mg Oral BID With Meals   PARoxetine 20 mg Oral Daily   Scopolamine 1 patch Transdermal Q72H   sodium chloride 10 mL Intravenous Q12H   traZODone 50 mg Oral Nightly         Vital Sign Min/Max for last 24 hours  Temp  Min: 97.6 °F (36.4 °C)  Max: 98.4 °F (36.9 °C)   BP  Min: 137/80  Max: 212/90   Pulse  Min: 84  Max: 109   Resp  Min: 18  Max: 20   SpO2  Min: 95 %  Max: 98 %   No Data Recorded   Weight  Min: 61.1 kg (134 lb 12.8 oz)  Max: 61.1 kg (134 lb 12.8 oz)     Flowsheet Rows      First Filed Value   Admission Height  167.6 cm (66\") Documented at 10/02/2019 1640   Admission Weight  59 kg (130 lb) Documented at 10/02/2019 1640          Objective       Physical Exam:     General Appearance:    Alert, cooperative, in no acute distress   Head:    Normocephalic, without obvious abnormality, atraumatic   Eyes:           Lids and lashes normal, conjunctivae and sclerae normal, no   icterus, no pallor, corneas clear, PERRLA "   Ears:    Bilateral partial deafness   Throat:   No oral lesions, no thrush, oral mucosa moist   Neck:   No adenopathy, supple, trachea midline, no thyromegaly, no   carotid bruit, no JVD   Back:     No kyphosis present, no scoliosis present, no skin lesions,      erythema or scars, no tenderness to percussion or                   palpation,   range of motion normal   Lungs:     Clear to auscultation,respirations regular, even and                  unlabored    Heart:   Regular rhythm.  Bradycardia.  No murmurs.   Chest Wall:    No abnormalities observed   Abdomen:     Normal bowel sounds, no masses, no organomegaly, soft        non-tender, non-distended, no guarding, no rebound                tenderness   Rectal:     Deferred   Extremities:  No pedal edema            Telemetry:  Normal sinus rhythm.  Heart rate 70-80 bpm      Nuclear stress test done 10/4/2019  -No ischemia      Labs  Results from last 7 days   Lab Units 10/03/19  0128 10/02/19  1651   WBC 10*3/mm3 8.45 9.46   HEMOGLOBIN g/dL 11.7* 12.2   HEMATOCRIT % 36.3 38.1   PLATELETS 10*3/mm3 230 234     Results from last 7 days   Lab Units 10/05/19  0408 10/04/19  0429 10/03/19  0128 10/02/19  1651   SODIUM mmol/L  --  138 136 136   POTASSIUM mmol/L 3.7 3.4* 3.5 3.9   CHLORIDE mmol/L  --  101 96* 97*   CO2 mmol/L  --  25.9 26.4 24.9   BUN mg/dL  --  9 12 11   CREATININE mg/dL  --  0.69 0.75 0.84   CALCIUM mg/dL  --  9.3 9.4 9.5   GLUCOSE mg/dL  --  93 131* 138*     Results from last 7 days   Lab Units 10/03/19  0128 10/02/19  1651   BILIRUBIN mg/dL 0.2 0.3   ALK PHOS U/L 62 59   AST (SGOT) U/L 9 10   ALT (SGPT) U/L 6 6     Results from last 7 days   Lab Units 10/05/19  0408 10/04/19  0429 10/03/19  1130 10/02/19  1651   MAGNESIUM mg/dL 1.9 1.7 2.0 1.4*             Results from last 7 days   Lab Units 10/03/19  0128 10/02/19  2015 10/02/19  1651   TROPONIN T ng/mL <0.010 <0.010 <0.010           Radiology: No radiology results for the last day    Assessment       1.  Uncontrolled hypertension-partly due to rebound hypertension as clonidine has been discontinued due to bradycardia.  2.  Orthostatic dizziness due to orthostatic drop in BP as a result of possible autonomic neuropathy due to long standing DM.  3.  Abnormal ECG due to LVH with repolarization abnormality  4.  Sinus bradycardia- due to medication-resolved    Plan     1.  For uncontrolled hypertension-resume clonidine as bradycardia is resolved.  Increase amlodipine to 10 mg p.o. daily.  Continue losartan 100 mg p.o. Daily  2.  For orthostatic dizziness which is due to possible autonomic neuropathy, -she knows that it is difficult to manage.  Advised, when required, to get up from supine or sitting position slowly after some leg exercise and compression stockings.      Sharri Ac MD [facility]  10/07/19  9:14 AM

## 2019-10-07 NOTE — PROGRESS NOTES
Discharge Planning Assessment   Luis Fernando     Patient Name: Mariluz Jenkins  MRN: [id number]  Today's Date: 10/7/2019    Admit Date: 10/2/2019        Discharge Plan     Row Name 10/07/19 1336       Plan    Plan  Pt admitted on 10/2/19.  Pt lives at home alone and plans to return home at discharge.  Pt currently does not utilize home health services.  Pt currently utilizes cane via unknown provider.  SS will follow.               HELENA NegreteW

## 2019-10-07 NOTE — THERAPY EVALUATION
Acute Care - Occupational Therapy Initial Evaluation   [place]     Patient Name: Mariluz Jenkins  : 1945  MRN: [id number]  Today's Date: 10/7/2019             Admit Date: 10/2/2019       ICD-10-CM ICD-9-CM   1. Symptomatic bradycardia R00.1 427.89     Patient Active Problem List   Diagnosis   • Symptomatic bradycardia   • Hypomagnesemia     Past Medical History:   Diagnosis Date   • Diabetes (CMS/HCC)    • Hypertension      History reviewed. No pertinent surgical history.       OT ASSESSMENT FLOWSHEET (last 12 hours)      Occupational Therapy Evaluation     Row Name 10/07/19 1127                   OT Evaluation Time/Intention    Document Type  evaluation  -KR        Mode of Treatment  occupational therapy  -KR        Patient Effort  good  -KR           Cognitive Assessment/Intervention- PT/OT    Orientation Status (Cognition)  oriented x 3  -KR        Follows Commands (Cognition)  WFL  -KR           ADL Assessment/Intervention    BADL Assessment/Intervention  bathing;upper body dressing;lower body dressing;grooming;feeding;toileting  -KR           Bathing Assessment/Intervention    Bathing [place] Level  bathing skills;minimum assist (75% patient effort)  -KR           Upper Body Dressing Assessment/Training    Upper Body Dressing [place] Level  upper body dressing skills;minimum assist (75% patient effort)  -KR           Lower Body Dressing Assessment/Training    Lower Body Dressing [place] Level  lower body dressing skills;minimum assist (75% patient effort)  -KR           Grooming Assessment/Training    [place] Level (Grooming)  grooming skills;minimum assist (75% patient effort)  -KR           Self-Feeding Assessment/Training    [place] Level (Feeding)  feeding skills;set up  -KR           Toileting Assessment/Training    [place] Level (Toileting)  toileting skills;minimum assist (75% patient effort)  -KR           General ROM    GENERAL ROM COMMENTS  BUE WFL  -KR            MMT (Manual Muscle Testing)    General MMT Comments  BUE 3-/5  -KR           Clinical Impression (OT)    Criteria for Skilled Therapeutic Interventions Met (OT Eval)  yes  -KR        Rehab Potential (OT Eval)  good, to achieve stated therapy goals  -KR           Planned OT Interventions    Planned Therapy Interventions (OT Eval)  BADL retraining;strengthening exercise;ROM/therapeutic exercise  -KR           OT Goals    Dressing Goal Selection (OT)  dressing, OT goal 1  -KR           Dressing Goal 1 (OT)    Activity/Assistive Device (Dressing Goal 1, OT)  dressing skills, all  -KR        [place]/Cues Needed (Dressing Goal 1, OT)  set-up required  -KR        Time Frame (Dressing Goal 1, OT)  by discharge  -KR          User Key  (r) = Recorded By, (t) = Taken By, (c) = Cosigned By    Initials Name Effective Dates    KR Pineda Tatum OT 04/03/18 -                OT Recommendation and Plan  Planned Therapy Interventions (OT Eval): BADL retraining, strengthening exercise, ROM/therapeutic exercise           Time Calculation:     Therapy Charges for Today     Code Description Service Date Service Provider Modifiers Qty    [id number]  OT EVAL MOD COMPLEXITY 4 10/7/2019 Pineda Tatum OT GO 1               Pineda Tatum OT  10/7/2019

## 2019-10-08 LAB
ANION GAP SERPL CALCULATED.3IONS-SCNC: 11.6 MMOL/L (ref 5–15)
BASOPHILS # BLD AUTO: 0.01 10*3/MM3 (ref 0–0.2)
BASOPHILS NFR BLD AUTO: 0.2 % (ref 0–1.5)
BUN BLD-MCNC: 14 MG/DL (ref 8–23)
BUN/CREAT SERPL: 17.7 (ref 7–25)
CALCIUM SPEC-SCNC: 9.2 MG/DL (ref 8.6–10.5)
CHLORIDE SERPL-SCNC: 98 MMOL/L (ref 98–107)
CO2 SERPL-SCNC: 24.4 MMOL/L (ref 22–29)
CREAT BLD-MCNC: 0.79 MG/DL (ref 0.57–1)
DEPRECATED RDW RBC AUTO: 43.7 FL (ref 37–54)
EOSINOPHIL # BLD AUTO: 0.13 10*3/MM3 (ref 0–0.4)
EOSINOPHIL NFR BLD AUTO: 2 % (ref 0.3–6.2)
ERYTHROCYTE [DISTWIDTH] IN BLOOD BY AUTOMATED COUNT: 14.2 % (ref 12.3–15.4)
GFR SERPL CREATININE-BSD FRML MDRD: 71 ML/MIN/1.73
GLUCOSE BLD-MCNC: 100 MG/DL (ref 65–99)
GLUCOSE BLDC GLUCOMTR-MCNC: 109 MG/DL (ref 70–130)
GLUCOSE BLDC GLUCOMTR-MCNC: 115 MG/DL (ref 70–130)
GLUCOSE BLDC GLUCOMTR-MCNC: 194 MG/DL (ref 70–130)
GLUCOSE BLDC GLUCOMTR-MCNC: 222 MG/DL (ref 70–130)
HCT VFR BLD AUTO: 33.4 % (ref 34–46.6)
HGB BLD-MCNC: 10.5 G/DL (ref 12–15.9)
IMM GRANULOCYTES # BLD AUTO: 0.01 10*3/MM3 (ref 0–0.05)
IMM GRANULOCYTES NFR BLD AUTO: 0.2 % (ref 0–0.5)
LYMPHOCYTES # BLD AUTO: 3.09 10*3/MM3 (ref 0.7–3.1)
LYMPHOCYTES NFR BLD AUTO: 46.4 % (ref 19.6–45.3)
MCH RBC QN AUTO: 26.9 PG (ref 26.6–33)
MCHC RBC AUTO-ENTMCNC: 31.4 G/DL (ref 31.5–35.7)
MCV RBC AUTO: 85.6 FL (ref 79–97)
MONOCYTES # BLD AUTO: 0.65 10*3/MM3 (ref 0.1–0.9)
MONOCYTES NFR BLD AUTO: 9.8 % (ref 5–12)
NEUTROPHILS # BLD AUTO: 2.77 10*3/MM3 (ref 1.7–7)
NEUTROPHILS NFR BLD AUTO: 41.4 % (ref 42.7–76)
PLATELET # BLD AUTO: 213 10*3/MM3 (ref 140–450)
PMV BLD AUTO: 10.2 FL (ref 6–12)
POTASSIUM BLD-SCNC: 4.5 MMOL/L (ref 3.5–5.2)
RBC # BLD AUTO: 3.9 10*6/MM3 (ref 3.77–5.28)
SODIUM BLD-SCNC: 134 MMOL/L (ref 136–145)
TROPONIN T SERPL-MCNC: <0.01 NG/ML (ref 0–0.03)
WBC NRBC COR # BLD: 6.66 10*3/MM3 (ref 3.4–10.8)

## 2019-10-08 PROCEDURE — 80048 BASIC METABOLIC PNL TOTAL CA: CPT | Performed by: INTERNAL MEDICINE

## 2019-10-08 PROCEDURE — 99233 SBSQ HOSP IP/OBS HIGH 50: CPT | Performed by: INTERNAL MEDICINE

## 2019-10-08 PROCEDURE — 94799 UNLISTED PULMONARY SVC/PX: CPT

## 2019-10-08 PROCEDURE — 84484 ASSAY OF TROPONIN QUANT: CPT | Performed by: INTERNAL MEDICINE

## 2019-10-08 PROCEDURE — 85025 COMPLETE CBC W/AUTO DIFF WBC: CPT | Performed by: INTERNAL MEDICINE

## 2019-10-08 PROCEDURE — 82962 GLUCOSE BLOOD TEST: CPT

## 2019-10-08 PROCEDURE — 25010000002 HEPARIN (PORCINE) PER 1000 UNITS: Performed by: HOSPITALIST

## 2019-10-08 PROCEDURE — 63710000001 INSULIN ASPART PER 5 UNITS: Performed by: PHYSICIAN ASSISTANT

## 2019-10-08 RX ORDER — CLONIDINE HYDROCHLORIDE 0.1 MG/1
0.1 TABLET ORAL EVERY 8 HOURS SCHEDULED
Status: DISCONTINUED | OUTPATIENT
Start: 2019-10-08 | End: 2019-10-11 | Stop reason: HOSPADM

## 2019-10-08 RX ADMIN — CARVEDILOL 6.25 MG: 6.25 TABLET, FILM COATED ORAL at 16:04

## 2019-10-08 RX ADMIN — MAGNESIUM GLUCONATE 500 MG ORAL TABLET 400 MG: 500 TABLET ORAL at 08:25

## 2019-10-08 RX ADMIN — CETIRIZINE HYDROCHLORIDE 5 MG: 10 TABLET, FILM COATED ORAL at 08:25

## 2019-10-08 RX ADMIN — CLONIDINE HYDROCHLORIDE 0.1 MG: 0.1 TABLET ORAL at 20:20

## 2019-10-08 RX ADMIN — TRAZODONE HYDROCHLORIDE 50 MG: 50 TABLET ORAL at 20:20

## 2019-10-08 RX ADMIN — FAMOTIDINE 20 MG: 20 TABLET, FILM COATED ORAL at 16:04

## 2019-10-08 RX ADMIN — SODIUM CHLORIDE 500 ML: 9 INJECTION, SOLUTION INTRAVENOUS at 18:17

## 2019-10-08 RX ADMIN — CLONIDINE HYDROCHLORIDE 0.2 MG: 0.2 TABLET ORAL at 05:14

## 2019-10-08 RX ADMIN — FAMOTIDINE 20 MG: 20 TABLET, FILM COATED ORAL at 05:24

## 2019-10-08 RX ADMIN — BUSPIRONE HYDROCHLORIDE 5 MG: 5 TABLET ORAL at 20:19

## 2019-10-08 RX ADMIN — HEPARIN SODIUM 5000 UNITS: 5000 INJECTION INTRAVENOUS; SUBCUTANEOUS at 08:25

## 2019-10-08 RX ADMIN — ATORVASTATIN CALCIUM 20 MG: 20 TABLET, FILM COATED ORAL at 20:20

## 2019-10-08 RX ADMIN — HEPARIN SODIUM 5000 UNITS: 5000 INJECTION INTRAVENOUS; SUBCUTANEOUS at 20:19

## 2019-10-08 RX ADMIN — CLONIDINE HYDROCHLORIDE 0.1 MG: 0.1 TABLET ORAL at 16:04

## 2019-10-08 RX ADMIN — ASPIRIN 81 MG: 81 TABLET, COATED ORAL at 08:25

## 2019-10-08 RX ADMIN — PAROXETINE HYDROCHLORIDE 20 MG: 20 TABLET, FILM COATED ORAL at 08:25

## 2019-10-08 RX ADMIN — CARVEDILOL 6.25 MG: 6.25 TABLET, FILM COATED ORAL at 08:25

## 2019-10-08 RX ADMIN — AMLODIPINE BESYLATE 10 MG: 10 TABLET ORAL at 08:25

## 2019-10-08 RX ADMIN — INSULIN ASPART 3 UNITS: 100 INJECTION, SOLUTION INTRAVENOUS; SUBCUTANEOUS at 20:20

## 2019-10-08 RX ADMIN — SODIUM CHLORIDE, PRESERVATIVE FREE 10 ML: 5 INJECTION INTRAVENOUS at 08:25

## 2019-10-08 RX ADMIN — BUSPIRONE HYDROCHLORIDE 5 MG: 5 TABLET ORAL at 08:25

## 2019-10-08 RX ADMIN — LOSARTAN POTASSIUM 100 MG: 50 TABLET, FILM COATED ORAL at 20:19

## 2019-10-08 NOTE — PLAN OF CARE
Problem: Patient Care Overview  Goal: Plan of Care Review  Outcome: Outcome(s) achieved Date Met: 10/08/19      Problem: Fall Risk (Adult)  Goal: Absence of Fall  Outcome: Ongoing (interventions implemented as appropriate)      Problem: Skin Injury Risk (Adult)  Goal: Skin Health and Integrity  Outcome: Ongoing (interventions implemented as appropriate)

## 2019-10-08 NOTE — PROGRESS NOTES
"  Patient Identification:  Name:  Mariluz Jenkins  Age:  74 y.o.  Sex:  female  :  1945  MRN:  [id number]  Visit Number:  [id number]  Primary care provider:  Delfin Mccarty APRN    Reason for follow-up:  Uncontrolled hypertension.  Orthostatic dizziness due to autonomic neuropathy    Subjective     Patient still have orthostatic dizziness.  Yesterday, she had an episode of sinus tachycardia and hence carvedilol was started.  Today she is in sinus bradycardia with a heart rate in 50s.  No history of palpitations or chest discomfort.  BP is fairly controlled now.      Medication Review:     amLODIPine 10 mg Oral Q24H   aspirin 81 mg Oral Daily   atorvastatin 20 mg Oral Nightly   busPIRone 5 mg Oral BID   carvedilol 6.25 mg Oral BID With Meals   cetirizine 5 mg Oral Daily   cloNIDine 0.1 mg Oral Q8H   famotidine 20 mg Oral BID AC   heparin (porcine) 5,000 Units Subcutaneous Q12H   insulin aspart 0-7 Units Subcutaneous 4x Daily AC & at Bedtime   losartan 100 mg Oral Nightly   magnesium oxide 400 mg Oral Daily   PARoxetine 20 mg Oral Daily   Scopolamine 1 patch Transdermal Q72H   sodium chloride 10 mL Intravenous Q12H   traZODone 50 mg Oral Nightly         Vital Sign Min/Max for last 24 hours  Temp  Min: 97.6 °F (36.4 °C)  Max: 98.5 °F (36.9 °C)   BP  Min: 101/52  Max: 186/96   Pulse  Min: 62  Max: 96   Resp  Min: 18  Max: 20   SpO2  Min: 93 %  Max: 99 %   No Data Recorded   Weight  Min: 61.8 kg (136 lb 3.2 oz)  Max: 61.8 kg (136 lb 3.2 oz)     Flowsheet Rows      First Filed Value   Admission Height  167.6 cm (66\") Documented at 10/02/2019 1640   Admission Weight  59 kg (130 lb) Documented at 10/02/2019 1640          Objective       Physical Exam:     General Appearance:    Alert, cooperative, in no acute distress   Head:    Normocephalic, without obvious abnormality, atraumatic   Eyes:           Lids and lashes normal, conjunctivae and sclerae normal, no   icterus, no pallor, corneas clear, PERRLA "   Ears:    Bilateral partial deafness   Throat:   No oral lesions, no thrush, oral mucosa moist   Neck:   No adenopathy, supple, trachea midline, no thyromegaly, no   carotid bruit, no JVD   Back:     No kyphosis present, no scoliosis present, no skin lesions,      erythema or scars, no tenderness to percussion or                   palpation,   range of motion normal   Lungs:     Clear to auscultation,respirations regular, even and                  unlabored    Heart:   Bradycardia.  Regular rhythm.  No murmurs.   Chest Wall:    No abnormalities observed   Abdomen:     Normal bowel sounds, no masses, no organomegaly, soft        non-tender, non-distended, no guarding, no rebound                tenderness   Rectal:     Deferred   Extremities:  No pedal edema          Telemetry:  Sinus bradycardia.  Heart rate-50s    ECG:  Done yesterday is reviewed.  This showed sinus tachycardia.  LVH with repolarization abnormality.      Labs  Results from last 7 days   Lab Units 10/08/19  0105 10/03/19  0128 10/02/19  1651   WBC 10*3/mm3 6.66 8.45 9.46   HEMOGLOBIN g/dL 10.5* 11.7* 12.2   HEMATOCRIT % 33.4* 36.3 38.1   PLATELETS 10*3/mm3 213 230 234     Results from last 7 days   Lab Units 10/08/19  0105 10/05/19  0408 10/04/19  0429 10/03/19  0128 10/02/19  1651   SODIUM mmol/L 134*  --  138 136 136   POTASSIUM mmol/L 4.5 3.7 3.4* 3.5 3.9   CHLORIDE mmol/L 98  --  101 96* 97*   CO2 mmol/L 24.4  --  25.9 26.4 24.9   BUN mg/dL 14  --  9 12 11   CREATININE mg/dL 0.79  --  0.69 0.75 0.84   CALCIUM mg/dL 9.2  --  9.3 9.4 9.5   GLUCOSE mg/dL 100*  --  93 131* 138*     Results from last 7 days   Lab Units 10/03/19  0128 10/02/19  1651   BILIRUBIN mg/dL 0.2 0.3   ALK PHOS U/L 62 59   AST (SGOT) U/L 9 10   ALT (SGPT) U/L 6 6     Results from last 7 days   Lab Units 10/05/19  0408 10/04/19  0429 10/03/19  1130 10/02/19  1651   MAGNESIUM mg/dL 1.9 1.7 2.0 1.4*             Results from last 7 days   Lab Units 10/08/19  0105 10/07/19  174  10/07/19  1157   TROPONIN T ng/mL <0.010 <0.010 <0.010           Radiology: Mri Brain With & Without Contrast    Result Date: 10/7/2019  Technically limited exam due to motion artifact. There were no recent areas of ischemia. There were no focal brain parenchymal abnormalities. There were inflammatory changes in the mastoid air cells bilaterally but more prominent on the right.  This report was finalized on 10/7/2019 3:44 PM by Dr. Jose Miguel Navas II, MD.        Assessment      1.  Uncontrolled hypertension-now fairly controlled.  2.  Orthostatic dizziness due to orthostatic drop in BP as a result of possible autonomic neuropathy due to long standing DM.  3.  Abnormal ECG due to LVH with repolarization abnormality  4.  Sinus bradycardia- recurred due to medications  5.  Episode of sinus tachycardia-resolved      Plan     Because of occurrence of bradycardia, clonidine and carvedilol dose were reduced with a parameter to hold if heart rate less than 60 bpm.  Continue losartan and amlodipine.      Sharri Ac MD [facility]  10/08/19  7:44 AM

## 2019-10-08 NOTE — PROGRESS NOTES
HOSPITALIST PROGRESS NOTE    Patient Identification:  Name:  Mariluz Jenkins  Age:  74 y.o.  Sex:  female  :  1945  MRN:  [id number]  Visit Number:  [id number]  Primary Care Provider:  Delfin Mccarty APRN    Length of stay:  6     HPI: 74 to female being seen in follow up for diziness and near syncope    Subjective:  The patient was seen earlier today and was resting in bed.  Patient was preparing to eat her lunch but reports that it is difficult to eat as it like the flavor and needed salt.  The patient continues to report some dizziness but states that it is not quite as severe as it was upon presentation.  The patient denies any nausea and/or vomiting today.  She does not have any cough, chest pain and/or palpitations.    Patient had an episode of sinus bradycardia in the 50s.    Patient was evaluated by ENT today.    The patient's hypertension has improved today, however, she remains significantly orthostatic.    Present during exam: PRASHANTH Appiah and the patient's daughter    Current Hospital Meds:    amLODIPine 10 mg Oral Q24H   aspirin 81 mg Oral Daily   atorvastatin 20 mg Oral Nightly   busPIRone 5 mg Oral BID   carvedilol 6.25 mg Oral BID With Meals   cetirizine 5 mg Oral Daily   cloNIDine 0.1 mg Oral Q8H   famotidine 20 mg Oral BID AC   heparin (porcine) 5,000 Units Subcutaneous Q12H   insulin aspart 0-7 Units Subcutaneous 4x Daily AC & at Bedtime   losartan 100 mg Oral Nightly   magnesium oxide 400 mg Oral Daily   PARoxetine 20 mg Oral Daily   Scopolamine 1 patch Transdermal Q72H   sodium chloride 500 mL Intravenous Once   sodium chloride 10 mL Intravenous Q12H   traZODone 50 mg Oral Nightly       Vital Signs  Temp:  [97.9 °F (36.6 °C)-98.6 °F (37 °C)] 98.6 °F (37 °C)  Heart Rate:  [62-80] 64  Resp:  [18] 18  BP: (101-184)/(52-99) 150/72      10/06/19  0545 10/07/19  0500 10/08/19  0500   Weight: 61 kg (134 lb 8 oz) 61.1 kg (134 lb 12.8 oz) 61.8 kg (136 lb 3.2 oz)     Body mass index is  21.98 kg/m².    Physical exam:  Physical Exam   Constitutional: She is oriented to person, place, and time. She appears well-developed and well-nourished. No distress.   HENT:   Head: Normocephalic and atraumatic.   Mouth/Throat: Oropharynx is clear and moist.   Eyes: Conjunctivae and EOM are normal. Pupils are equal, round, and reactive to light.   Neck: Neck supple. No tracheal deviation present. No thyromegaly present.   Cardiovascular: Normal rate and regular rhythm. Exam reveals no gallop and no friction rub.   No murmur heard.  Pulmonary/Chest: Breath sounds normal. No respiratory distress. She has no wheezes. She has no rales.   Abdominal: Soft. Bowel sounds are normal. She exhibits no distension. There is no hepatomegaly. There is no tenderness. There is no guarding.   Musculoskeletal: Normal range of motion. She exhibits no tenderness.   Neurological: She is alert and oriented to person, place, and time. No cranial nerve deficit.   Skin: Skin is warm and dry. No rash noted. No erythema.   Psychiatric: She has a normal mood and affect.     Results Review:    Telemetry reviewed: Sinus rhythm with T wave inversion, (previously noted), current heart rate 62    Results from last 7 days   Lab Units 10/08/19  0105 10/03/19  0128 10/02/19  1651   WBC 10*3/mm3 6.66 8.45 9.46   HEMOGLOBIN g/dL 10.5* 11.7* 12.2   HEMATOCRIT % 33.4* 36.3 38.1   PLATELETS 10*3/mm3 213 230 234     Results from last 7 days   Lab Units 10/08/19  0105 10/05/19  0408 10/04/19  0429 10/03/19  0128 10/02/19  1651   SODIUM mmol/L 134*  --  138 136 136   POTASSIUM mmol/L 4.5 3.7 3.4* 3.5 3.9   CHLORIDE mmol/L 98  --  101 96* 97*   CO2 mmol/L 24.4  --  25.9 26.4 24.9   BUN mg/dL 14  --  9 12 11   CREATININE mg/dL 0.79  --  0.69 0.75 0.84   CALCIUM mg/dL 9.2  --  9.3 9.4 9.5   GLUCOSE mg/dL 100*  --  93 131* 138*     Results from last 7 days   Lab Units 10/03/19  0128 10/02/19  1651   BILIRUBIN mg/dL 0.2 0.3   ALK PHOS U/L 62 59   AST (SGOT) U/L 9  10   ALT (SGPT) U/L 6 6     Results from last 7 days   Lab Units 10/05/19  0408 10/04/19  0429 10/03/19  1130 10/02/19  1651   MAGNESIUM mg/dL 1.9 1.7 2.0 1.4*         Results from last 7 days   Lab Units 10/08/19  0105 10/07/19  1743 10/07/19  1157   TROPONIN T ng/mL <0.010 <0.010 <0.010     MRI brain with and without contrast:  MRI FINDINGS: Exam is technically limited. There is a moderate amount of  motion artifact on the study. The ventricular system and the  subarachnoid spaces show changes of cerebral atrophy. There is no mass  effect or midline shift in the brain. The diffusion-weighted scans show  no recent areas of ischemia. There is no evidence of subdural or  epidural hematoma. There is some gliosis throughout the white matter. On  the post contrasted scans, there were no areas of breakdown of  blood-brain barrier or abnormal enhancement within the brain parenchyma.  There is increased signal indicating inflammation in the mastoid air  cells of the temporal bone bilaterally, but more prominent on the right.     IMPRESSION:  Technically limited exam due to motion artifact. There were  no recent areas of ischemia. There were no focal brain parenchymal  abnormalities. There were inflammatory changes in the mastoid air cells  bilaterally but more prominent on the right.      Assessment/Plan     -Symptomatic bradycardia with near syncope in the setting of bilateral inflammatory changes of the mastoid air cells, right greater than left with mastoid tenderness upon palpation: Continue with fall precautions.  Patient was seen by ENT today who does not feel that the patient's symptoms are related to the inflammatory changes noted on MRI.      Patient will receive a 500 mL normal saline bolus today with repeat of her orthostatic vital signs. Her carotid ultrasound and echocardiogram results have been reviewed. Continue with her scopolamine patch, pseudoephedrine (for now while monitoring blood pressure) and  cetirizine. Patient's symptoms may also be related to autonomic dysfunction in the setting of DM.     -Uncontrolled essential hypertension with an episode of tachycardia that may be rebound from holding her clonidine as patient presented with bradycardia: Patient again had an episode of bradycardia this morning in the 50s. Dr. Ac placed holding parameters on her Coreg (new medication) and her clonidine.  Patient's home amlodipine has also been resumed.  Overall her blood pressure is improved today.    -Abnormal EKG with T wave inversion: Patient has had several negative troponin T levels.  Patient had a stress test that revealed a normal ejection fraction, normal wall motion abnormality and no evidence of ischemia.  Continue to monitor on telemetry for now.    -Diabetes mellitus type II that is non-insulin-dependent with hemoglobin A1c 5.9%: Given patient's normoglycemia and the propensity for oral glycemic agents to cause prolonged hypoglycemia, will hold metformin for now.  Patient has as needed sliding scale insulin coverage.    The patient is high risk due to the following diagnoses/reasons: Presyncope of unclear etiology, orthostatic hypotension and uncontrolled hypertension (improving).    I discussed the patients findings and my recommendations with patient, family and nursing staff.        Disposition  Hopefully home in 1-2 days pending BP/HR readings/ongoing orthostasis.      Ekta Odom DO  10/08/19  4:39 PM

## 2019-10-08 NOTE — SIGNIFICANT NOTE
10/08/19 1547   Rehab Time/Intention   Evaluation Not Performed patient unavailable for evaluation  (Pt off floor for testing. Will continue to follow. )   Rehab Treatment   Discipline physical therapist

## 2019-10-09 LAB
ALBUMIN SERPL-MCNC: 3.33 G/DL (ref 3.5–5.2)
ALBUMIN/GLOB SERPL: 1.4 G/DL
ALP SERPL-CCNC: 58 U/L (ref 39–117)
ALT SERPL W P-5'-P-CCNC: 9 U/L (ref 1–33)
ANION GAP SERPL CALCULATED.3IONS-SCNC: 11.2 MMOL/L (ref 5–15)
AST SERPL-CCNC: 14 U/L (ref 1–32)
BASOPHILS # BLD AUTO: 0.01 10*3/MM3 (ref 0–0.2)
BASOPHILS NFR BLD AUTO: 0.1 % (ref 0–1.5)
BILIRUB SERPL-MCNC: 0.2 MG/DL (ref 0.2–1.2)
BUN BLD-MCNC: 15 MG/DL (ref 8–23)
BUN/CREAT SERPL: 20 (ref 7–25)
CALCIUM SPEC-SCNC: 9.7 MG/DL (ref 8.6–10.5)
CHLORIDE SERPL-SCNC: 100 MMOL/L (ref 98–107)
CO2 SERPL-SCNC: 24.8 MMOL/L (ref 22–29)
CREAT BLD-MCNC: 0.75 MG/DL (ref 0.57–1)
DEPRECATED RDW RBC AUTO: 42.2 FL (ref 37–54)
EOSINOPHIL # BLD AUTO: 0.13 10*3/MM3 (ref 0–0.4)
EOSINOPHIL NFR BLD AUTO: 1.9 % (ref 0.3–6.2)
ERYTHROCYTE [DISTWIDTH] IN BLOOD BY AUTOMATED COUNT: 13.7 % (ref 12.3–15.4)
GFR SERPL CREATININE-BSD FRML MDRD: 76 ML/MIN/1.73
GLOBULIN UR ELPH-MCNC: 2.4 GM/DL
GLUCOSE BLD-MCNC: 123 MG/DL (ref 65–99)
GLUCOSE BLDC GLUCOMTR-MCNC: 112 MG/DL (ref 70–130)
GLUCOSE BLDC GLUCOMTR-MCNC: 119 MG/DL (ref 70–130)
GLUCOSE BLDC GLUCOMTR-MCNC: 123 MG/DL (ref 70–130)
GLUCOSE BLDC GLUCOMTR-MCNC: 148 MG/DL (ref 70–130)
HCT VFR BLD AUTO: 34.2 % (ref 34–46.6)
HGB BLD-MCNC: 10.8 G/DL (ref 12–15.9)
IMM GRANULOCYTES # BLD AUTO: 0.01 10*3/MM3 (ref 0–0.05)
IMM GRANULOCYTES NFR BLD AUTO: 0.1 % (ref 0–0.5)
LYMPHOCYTES # BLD AUTO: 2.22 10*3/MM3 (ref 0.7–3.1)
LYMPHOCYTES NFR BLD AUTO: 32 % (ref 19.6–45.3)
MAGNESIUM SERPL-MCNC: 1.8 MG/DL (ref 1.6–2.4)
MCH RBC QN AUTO: 26.9 PG (ref 26.6–33)
MCHC RBC AUTO-ENTMCNC: 31.6 G/DL (ref 31.5–35.7)
MCV RBC AUTO: 85.3 FL (ref 79–97)
MONOCYTES # BLD AUTO: 0.76 10*3/MM3 (ref 0.1–0.9)
MONOCYTES NFR BLD AUTO: 11 % (ref 5–12)
NEUTROPHILS # BLD AUTO: 3.8 10*3/MM3 (ref 1.7–7)
NEUTROPHILS NFR BLD AUTO: 54.9 % (ref 42.7–76)
PLATELET # BLD AUTO: 213 10*3/MM3 (ref 140–450)
PMV BLD AUTO: 10.7 FL (ref 6–12)
POTASSIUM BLD-SCNC: 4.3 MMOL/L (ref 3.5–5.2)
PROT SERPL-MCNC: 5.7 G/DL (ref 6–8.5)
RBC # BLD AUTO: 4.01 10*6/MM3 (ref 3.77–5.28)
SODIUM BLD-SCNC: 136 MMOL/L (ref 136–145)
WBC NRBC COR # BLD: 6.93 10*3/MM3 (ref 3.4–10.8)

## 2019-10-09 PROCEDURE — 94799 UNLISTED PULMONARY SVC/PX: CPT

## 2019-10-09 PROCEDURE — 97162 PT EVAL MOD COMPLEX 30 MIN: CPT

## 2019-10-09 PROCEDURE — 85025 COMPLETE CBC W/AUTO DIFF WBC: CPT | Performed by: INTERNAL MEDICINE

## 2019-10-09 PROCEDURE — 99233 SBSQ HOSP IP/OBS HIGH 50: CPT | Performed by: INTERNAL MEDICINE

## 2019-10-09 PROCEDURE — 80053 COMPREHEN METABOLIC PANEL: CPT | Performed by: INTERNAL MEDICINE

## 2019-10-09 PROCEDURE — 25010000002 HEPARIN (PORCINE) PER 1000 UNITS: Performed by: HOSPITALIST

## 2019-10-09 PROCEDURE — 83735 ASSAY OF MAGNESIUM: CPT | Performed by: INTERNAL MEDICINE

## 2019-10-09 PROCEDURE — 82962 GLUCOSE BLOOD TEST: CPT

## 2019-10-09 PROCEDURE — 25010000002 MAGNESIUM SULFATE 2 GM/50ML SOLUTION: Performed by: HOSPITALIST

## 2019-10-09 RX ORDER — SODIUM CHLORIDE 9 MG/ML
50 INJECTION, SOLUTION INTRAVENOUS CONTINUOUS
Status: ACTIVE | OUTPATIENT
Start: 2019-10-09 | End: 2019-10-09

## 2019-10-09 RX ORDER — MAGNESIUM SULFATE HEPTAHYDRATE 40 MG/ML
2 INJECTION, SOLUTION INTRAVENOUS ONCE
Status: DISCONTINUED | OUTPATIENT
Start: 2019-10-09 | End: 2019-10-11 | Stop reason: HOSPADM

## 2019-10-09 RX ADMIN — CETIRIZINE HYDROCHLORIDE 5 MG: 10 TABLET, FILM COATED ORAL at 07:40

## 2019-10-09 RX ADMIN — HEPARIN SODIUM 5000 UNITS: 5000 INJECTION INTRAVENOUS; SUBCUTANEOUS at 21:10

## 2019-10-09 RX ADMIN — SCOPALAMINE 1 PATCH: 1 PATCH, EXTENDED RELEASE TRANSDERMAL at 14:13

## 2019-10-09 RX ADMIN — SODIUM CHLORIDE, PRESERVATIVE FREE 10 ML: 5 INJECTION INTRAVENOUS at 07:41

## 2019-10-09 RX ADMIN — MAGNESIUM GLUCONATE 500 MG ORAL TABLET 400 MG: 500 TABLET ORAL at 07:38

## 2019-10-09 RX ADMIN — ASPIRIN 81 MG: 81 TABLET, COATED ORAL at 07:40

## 2019-10-09 RX ADMIN — SODIUM CHLORIDE 50 ML/HR: 9 INJECTION, SOLUTION INTRAVENOUS at 11:57

## 2019-10-09 RX ADMIN — BUSPIRONE HYDROCHLORIDE 5 MG: 5 TABLET ORAL at 21:07

## 2019-10-09 RX ADMIN — HEPARIN SODIUM 5000 UNITS: 5000 INJECTION INTRAVENOUS; SUBCUTANEOUS at 07:41

## 2019-10-09 RX ADMIN — FAMOTIDINE 20 MG: 20 TABLET, FILM COATED ORAL at 16:49

## 2019-10-09 RX ADMIN — PAROXETINE HYDROCHLORIDE 20 MG: 20 TABLET, FILM COATED ORAL at 07:38

## 2019-10-09 RX ADMIN — FAMOTIDINE 20 MG: 20 TABLET, FILM COATED ORAL at 05:38

## 2019-10-09 RX ADMIN — LOSARTAN POTASSIUM 100 MG: 50 TABLET, FILM COATED ORAL at 21:10

## 2019-10-09 RX ADMIN — SODIUM CHLORIDE, PRESERVATIVE FREE 10 ML: 5 INJECTION INTRAVENOUS at 21:10

## 2019-10-09 RX ADMIN — CARVEDILOL 6.25 MG: 6.25 TABLET, FILM COATED ORAL at 16:50

## 2019-10-09 RX ADMIN — BUSPIRONE HYDROCHLORIDE 5 MG: 5 TABLET ORAL at 07:39

## 2019-10-09 RX ADMIN — CARVEDILOL 6.25 MG: 6.25 TABLET, FILM COATED ORAL at 07:39

## 2019-10-09 RX ADMIN — TRAZODONE HYDROCHLORIDE 50 MG: 50 TABLET ORAL at 21:07

## 2019-10-09 RX ADMIN — CLONIDINE HYDROCHLORIDE 0.1 MG: 0.1 TABLET ORAL at 14:13

## 2019-10-09 RX ADMIN — CLONIDINE HYDROCHLORIDE 0.1 MG: 0.1 TABLET ORAL at 05:38

## 2019-10-09 RX ADMIN — ATORVASTATIN CALCIUM 20 MG: 20 TABLET, FILM COATED ORAL at 21:07

## 2019-10-09 RX ADMIN — MAGNESIUM SULFATE IN WATER 2 G: 40 INJECTION, SOLUTION INTRAVENOUS at 05:39

## 2019-10-09 RX ADMIN — SODIUM CHLORIDE 500 ML: 9 INJECTION, SOLUTION INTRAVENOUS at 11:57

## 2019-10-09 RX ADMIN — AMLODIPINE BESYLATE 10 MG: 10 TABLET ORAL at 07:40

## 2019-10-09 NOTE — PROGRESS NOTES
"  Patient Identification:  Name:  Mariluz Jenkins  Age:  74 y.o.  Sex:  female  :  1945  MRN:  [id number]  Visit Number:  [id number]  Primary care provider:  Delfin Mccarty APRN    Reason for follow-up:    Uncontrolled hypertension.  Orthostatic dizziness due to autonomic neuropathy    Subjective     Orthostatic dizziness is improving but persistent.  BP is controlled.  Heart rate is improved to normal range.  No new complaints.      Medication Review:     amLODIPine 10 mg Oral Q24H   aspirin 81 mg Oral Daily   atorvastatin 20 mg Oral Nightly   busPIRone 5 mg Oral BID   carvedilol 6.25 mg Oral BID With Meals   cloNIDine 0.1 mg Oral Q8H   famotidine 20 mg Oral BID AC   heparin (porcine) 5,000 Units Subcutaneous Q12H   insulin aspart 0-7 Units Subcutaneous 4x Daily AC & at Bedtime   losartan 100 mg Oral Nightly   magnesium oxide 400 mg Oral Daily   magnesium sulfate 2 g Intravenous Once   PARoxetine 20 mg Oral Daily   Scopolamine 1 patch Transdermal Q72H   sodium chloride 10 mL Intravenous Q12H   traZODone 50 mg Oral Nightly         Vital Sign Min/Max for last 24 hours  Temp  Min: 97.8 °F (36.6 °C)  Max: 98.8 °F (37.1 °C)   BP  Min: 134/57  Max: 184/86   Pulse  Min: 59  Max: 72   Resp  Min: 18  Max: 20   SpO2  Min: 93 %  Max: 98 %   No Data Recorded   Weight  Min: 60.2 kg (132 lb 11.2 oz)  Max: 60.2 kg (132 lb 11.2 oz)     Flowsheet Rows      First Filed Value   Admission Height  167.6 cm (66\") Documented at 10/02/2019 1640   Admission Weight  59 kg (130 lb) Documented at 10/02/2019 1640          Objective       Physical Exam:     General Appearance:    Alert, cooperative, in no acute distress   Head:    Normocephalic, without obvious abnormality, atraumatic   Eyes:           Lids and lashes normal, conjunctivae and sclerae normal, no   icterus, no pallor, corneas clear, PERRLA   Ears:    Bilateral partial deafness   Throat:   No oral lesions, no thrush, oral mucosa moist   Neck:   No adenopathy, " supple, trachea midline, no thyromegaly, no   carotid bruit, no JVD   Back:     No kyphosis present, no scoliosis present, no skin lesions,      erythema or scars, no tenderness to percussion or                   palpation,   range of motion normal   Lungs:     Clear to auscultation,respirations regular, even and                  unlabored    Heart:    Regular rhythm.  Normal heart rate.  No murmurs   Chest Wall:    No abnormalities observed   Abdomen:     Normal bowel sounds, no masses, no organomegaly, soft        non-tender, non-distended, no guarding, no rebound                tenderness   Rectal:     Deferred   Extremities:  No pedal edema          Telemetry:  Normal sinus rhythm.  Heart rate in 70s.        Labs  Results from last 7 days   Lab Units 10/09/19  0336 10/08/19  0105 10/03/19  0128   WBC 10*3/mm3 6.93 6.66 8.45   HEMOGLOBIN g/dL 10.8* 10.5* 11.7*   HEMATOCRIT % 34.2 33.4* 36.3   PLATELETS 10*3/mm3 213 213 230     Results from last 7 days   Lab Units 10/09/19  0336 10/08/19  0105 10/05/19  0408 10/04/19  0429 10/03/19  0128   SODIUM mmol/L 136 134*  --  138 136   POTASSIUM mmol/L 4.3 4.5 3.7 3.4* 3.5   CHLORIDE mmol/L 100 98  --  101 96*   CO2 mmol/L 24.8 24.4  --  25.9 26.4   BUN mg/dL 15 14  --  9 12   CREATININE mg/dL 0.75 0.79  --  0.69 0.75   CALCIUM mg/dL 9.7 9.2  --  9.3 9.4   GLUCOSE mg/dL 123* 100*  --  93 131*     Results from last 7 days   Lab Units 10/09/19  0336 10/03/19  0128   BILIRUBIN mg/dL 0.2 0.2   ALK PHOS U/L 58 62   AST (SGOT) U/L 14 9   ALT (SGPT) U/L 9 6     Results from last 7 days   Lab Units 10/09/19  0336 10/05/19  0408 10/04/19  0429 10/03/19  1130   MAGNESIUM mg/dL 1.8 1.9 1.7 2.0             Results from last 7 days   Lab Units 10/08/19  0105 10/07/19  1743 10/07/19  1157   TROPONIN T ng/mL <0.010 <0.010 <0.010           Radiology: No radiology results for the last day    Assessment      1.  Uncontrolled hypertension.  Fairly controlled now.  2.  Orthostatic dizziness  due to orthostatic drop in BP.  Due to autonomic neuropathy.  3.  Sinus bradycardia resolved.  4.  Abnormal ECG due to LVH.  No evidence of ischemia.    Plan     Continue low-dose Coreg, clonidine, amlodipine and losartan.  She wants to go home.  If BP and HR stays stable, may consider discharge tomorrow      Sharri Ac MD [facility]  10/09/19  5:32 PM

## 2019-10-09 NOTE — PROGRESS NOTES
HOSPITALIST PROGRESS NOTE    Patient Identification:  Name:  Mariluz Jenkins  Age:  74 y.o.  Sex:  female  :  1945  MRN:  [id number]  Visit Number:  [id number]  Primary Care Provider:  Delfin Mccarty APRN    Length of stay:  7     HPI: 74 to female being seen in follow up for diziness and near syncope    Subjective:  The patient was seen earlier today and was resting in bed. She currently complains of feeling nauseated. She continues to be orthostatic but overall it has slightly improved.     No chest pains, dyspnea and/or cough.     Present during exam: PRASHANTH Harrison and the patient's daughter    Current Hospital Meds:    amLODIPine 10 mg Oral Q24H   aspirin 81 mg Oral Daily   atorvastatin 20 mg Oral Nightly   busPIRone 5 mg Oral BID   carvedilol 6.25 mg Oral BID With Meals   cetirizine 5 mg Oral Daily   cloNIDine 0.1 mg Oral Q8H   famotidine 20 mg Oral BID AC   heparin (porcine) 5,000 Units Subcutaneous Q12H   insulin aspart 0-7 Units Subcutaneous 4x Daily AC & at Bedtime   losartan 100 mg Oral Nightly   magnesium oxide 400 mg Oral Daily   magnesium sulfate 2 g Intravenous Once   PARoxetine 20 mg Oral Daily   Scopolamine 1 patch Transdermal Q72H   sodium chloride 10 mL Intravenous Q12H   traZODone 50 mg Oral Nightly       Vital Signs  Temp:  [97.8 °F (36.6 °C)-98.6 °F (37 °C)] 97.8 °F (36.6 °C)  Heart Rate:  [59-72] 59  Resp:  [18-20] 18  BP: (134-184)/(57-86) 149/81      10/07/19  0500 10/08/19  0500 10/09/19  0500   Weight: 61.1 kg (134 lb 12.8 oz) 61.8 kg (136 lb 3.2 oz) 60.2 kg (132 lb 11.2 oz)     Body mass index is 21.42 kg/m².    Physical exam:  Physical Exam   Constitutional: She is oriented to person, place, and time. She appears well-developed and well-nourished. No distress.   HENT:   Head: Normocephalic and atraumatic.   Mouth/Throat: Oropharynx is clear and moist.   Eyes: Conjunctivae and EOM are normal. Pupils are equal, round, and reactive to light.   Neck: Neck supple. No tracheal  deviation present. No thyromegaly present.   Cardiovascular: Regular rhythm. Bradycardia present. Exam reveals no gallop and no friction rub.   No murmur heard.  Pulmonary/Chest: Breath sounds normal. No respiratory distress. She has no wheezes. She has no rales.   Abdominal: Soft. Bowel sounds are normal. She exhibits no distension. There is no hepatomegaly. There is no tenderness. There is no guarding.   Musculoskeletal: Normal range of motion. She exhibits no tenderness.   Neurological: She is alert and oriented to person, place, and time. No cranial nerve deficit.   Skin: Skin is warm and dry. No rash noted. No erythema.   Psychiatric: She has a normal mood and affect.     Results Review:    Telemetry reviewed: Sinus bradycardia-Sinus rhythm (50s-60s)    Results from last 7 days   Lab Units 10/09/19  0336 10/08/19  0105 10/03/19  0128 10/02/19  1651   WBC 10*3/mm3 6.93 6.66 8.45 9.46   HEMOGLOBIN g/dL 10.8* 10.5* 11.7* 12.2   HEMATOCRIT % 34.2 33.4* 36.3 38.1   PLATELETS 10*3/mm3 213 213 230 234     Results from last 7 days   Lab Units 10/09/19  0336 10/08/19  0105 10/05/19  0408 10/04/19  0429 10/03/19  0128 10/02/19  1651   SODIUM mmol/L 136 134*  --  138 136 136   POTASSIUM mmol/L 4.3 4.5 3.7 3.4* 3.5 3.9   CHLORIDE mmol/L 100 98  --  101 96* 97*   CO2 mmol/L 24.8 24.4  --  25.9 26.4 24.9   BUN mg/dL 15 14  --  9 12 11   CREATININE mg/dL 0.75 0.79  --  0.69 0.75 0.84   CALCIUM mg/dL 9.7 9.2  --  9.3 9.4 9.5   GLUCOSE mg/dL 123* 100*  --  93 131* 138*     Results from last 7 days   Lab Units 10/09/19  0336 10/03/19  0128 10/02/19  1651   BILIRUBIN mg/dL 0.2 0.2 0.3   ALK PHOS U/L 58 62 59   AST (SGOT) U/L 14 9 10   ALT (SGPT) U/L 9 6 6     Results from last 7 days   Lab Units 10/09/19  0336 10/05/19  0408 10/04/19  0429 10/03/19  1130 10/02/19  1651   MAGNESIUM mg/dL 1.8 1.9 1.7 2.0 1.4*         Results from last 7 days   Lab Units 10/08/19  0105 10/07/19  1743 10/07/19  1157   TROPONIN T ng/mL <0.010 <0.010  <0.010     MRI brain with and without contrast:  MRI FINDINGS: Exam is technically limited. There is a moderate amount of  motion artifact on the study. The ventricular system and the  subarachnoid spaces show changes of cerebral atrophy. There is no mass  effect or midline shift in the brain. The diffusion-weighted scans show  no recent areas of ischemia. There is no evidence of subdural or  epidural hematoma. There is some gliosis throughout the white matter. On  the post contrasted scans, there were no areas of breakdown of  blood-brain barrier or abnormal enhancement within the brain parenchyma.  There is increased signal indicating inflammation in the mastoid air  cells of the temporal bone bilaterally, but more prominent on the right.     IMPRESSION:  Technically limited exam due to motion artifact. There were  no recent areas of ischemia. There were no focal brain parenchymal  abnormalities. There were inflammatory changes in the mastoid air cells  bilaterally but more prominent on the right.      Assessment/Plan     -Symptomatic bradycardia with near syncope in the setting of bilateral inflammatory changes of the mastoid air cells, right greater than left with mastoid tenderness upon palpation: Continue with fall precautions.  Patient was seen by ENT today who does not feel that the patient's symptoms are related to the inflammatory changes noted on MRI.      Patient will receive a 500 mL normal saline bolus again today with repeat of her orthostatic vital signs. Instructed nursing staff to educate regarding waiting at least 3 minutes in between positions. Her carotid ultrasound and echocardiogram results have been reviewed. Continue with her scopolamine patch; discontinue pseudoephedrine and cetirizine. Patient's symptoms may also be related to autonomic dysfunction in the setting of DM.     -Uncontrolled essential hypertension with an episode of tachycardia that may be rebound from holding her clonidine as  patient presented with bradycardia: Patient's telemetry again reveals sinus bradycardia-sinus 60s; await further recommendations from Dr. Ac. May have to decrease the dose of Coreg, although, her BP is overall much improved.     -Abnormal EKG with T wave inversion: Patient has had several negative troponin T levels.  Patient had a stress test that revealed a normal ejection fraction, normal wall motion abnormality and no evidence of ischemia.  Continue to monitor on telemetry for now.    -Diabetes mellitus type II that is non-insulin-dependent with hemoglobin A1c 5.9%: Given patient's normoglycemia and the propensity for oral glycemic agents to cause prolonged hypoglycemia, will hold metformin for now.  Patient has as needed sliding scale insulin coverage.    The patient is high risk due to the following diagnoses/reasons: Presyncope of unclear etiology, orthostatic hypotension and uncontrolled hypertension (improving).    I discussed the patients findings and my recommendations with patient, family and nursing staff.        Disposition  Hopefully home in 1-2 days pending BP/HR readings/ongoing orthostasis.      Ekta Odom DO  10/09/19  12:51 PM

## 2019-10-09 NOTE — THERAPY EVALUATION
Acute Care - Physical Therapy Initial Evaluation   Luis Fernando     Patient Name: Mariluz Jenkins  : 1945  MRN: [id number]  Today's Date: 10/9/2019   Onset of Illness/Injury or Date of Surgery: 10/02/19(admit date)  Date of Referral to PT: 10/06/19  Referring Physician: Shalom      Admit Date: 10/2/2019    Visit Dx:     ICD-10-CM ICD-9-CM   1. Symptomatic bradycardia R00.1 427.89     Patient Active Problem List   Diagnosis   • Symptomatic bradycardia   • Hypomagnesemia     Past Medical History:   Diagnosis Date   • Diabetes (CMS/[facility])    • Hypertension      History reviewed. No pertinent surgical history.     PT ASSESSMENT (last 12 hours)      Physical Therapy Evaluation     Row Name 10/09/19 1517          PT Evaluation Time/Intention    Subjective Information  complains of;dizziness  -CT     Document Type  evaluation  -CT     Mode of Treatment  individual therapy;physical therapy  -CT     Patient Effort  good  -CT     Symptoms Noted During/After Treatment  dizziness  -CT     Comment  Pt tolerated evaluation fairly well today. Pt BP supine 179/75, sitting 153/72, sitting after 3 minutes 168/74, standing 146/85, standing after 3 minutes 145/64, and sitting again 187/83. Pt did not ambulate today d/t increased dizziness upon standing. Pt reports she has ambulated with her family in the hallways. Pt is not safe to return home alone and will need assistance d/t dizziness and frequent falls.   -CT     Row Name 10/09/19 1517          General Information    Patient Profile Reviewed?  yes  -CT     Onset of Illness/Injury or Date of Surgery  10/02/19 admit date  -CT     Referring Physician  Shalom  -CT     Patient Observations  alert;cooperative;agree to therapy  -CT     Prior Level of Function  independent:;all household mobility with dizziness and frequent falls  -CT     Equipment Currently Used at Home  none  -CT     Existing Precautions/Restrictions  fall  -CT     Equipment Issued to Patient  gait belt  -CT     Risks  Reviewed  patient:;family:;LOB;nausea/vomiting;dizziness;increased discomfort;change in vital signs;increased drainage;lines disloged  -CT     Benefits Reviewed  patient:;family:;improve function;increase independence;increase strength;increase balance;decrease pain;decrease risk of DVT;improve skin integrity;increase knowledge  -CT     Barriers to Rehab  medically complex  -CT     Row Name 10/09/19 1517          Relationship/Environment    Primary Source of Support/Comfort  child(debra)  -CT     Lives With  alone  -CT     Row Name 10/09/19 1517          Resource/Environmental Concerns    Current Living Arrangements  home/apartment/condo  -CT     Row Name 10/09/19 1517          Cognitive Assessment/Intervention- PT/OT    Orientation Status (Cognition)  oriented x 3  -CT     Follows Commands (Cognition)  WFL  -CT     Safety Deficit (Cognitive)  safety precautions awareness  -CT     Row Name 10/09/19 1517          Safety Issues, Functional Mobility    Impairments Affecting Function (Mobility)  -- dizziness  -CT     Row Name 10/09/19 1517          Bed Mobility Assessment/Treatment    Bed Mobility Assessment/Treatment  bed mobility (all) activities  -CT     [place] Level (Bed Mobility)  supervision  -CT     Assistive Device (Bed Mobility)  bed rails  -CT     Row Name 10/09/19 1517          Transfer Assessment/Treatment    Transfer Assessment/Treatment  sit-stand transfer;stand-sit transfer  -CT     Sit-Stand [place] (Transfers)  contact guard  -CT     Stand-Sit [place] (Transfers)  contact guard  -CT     Row Name 10/09/19 1517          Sit-Stand Transfer    Assistive Device (Sit-Stand Transfers)  walker, front-wheeled  -CT     Row Name 10/09/19 1517          Stand-Sit Transfer    Assistive Device (Stand-Sit Transfers)  walker, front-wheeled  -CT     Row Name 10/09/19 1517          Gait/Stairs Assessment/Training    Comment (Gait/Stairs)  did not ambulate at time of eval d/t increased dizziness and change in  BP  -CT     Row Name 10/09/19 1517          General ROM    GENERAL ROM COMMENTS  BLE grossly WFL  -CT     Row Name 10/09/19 1517          MMT (Manual Muscle Testing)    General MMT Comments  BLE grossly 3+/5  -CT     Row Name 10/09/19 1517          Pain Assessment    Additional Documentation  -- no pain reported  -CT     Row Name 10/09/19 1517          Plan of Care Review    Plan of Care Reviewed With  patient  -CT     Row Name 10/09/19 1517          Physical Therapy Clinical Impression    Date of Referral to PT  10/06/19  -CT     PT Diagnosis (PT Clinical Impression)  decreased functional mobility associated with dizziness  -CT     Prognosis (PT Clinical Impression)  good  -CT     Functional Level at Time of Evaluation (PT Clinical Impression)  CGA  -CT     Patient/Family Goals Statement (PT Clinical Impression)  Pt goals are to return to normal daily activities with no dizziness  -CT     Criteria for Skilled Interventions Met (PT Clinical Impression)  yes;treatment indicated  -CT     Pathology/Pathophysiology Noted (Describe Specifically for Each System)  musculoskeletal;neuromuscular  -CT     Impairments Found (describe specific impairments)  aerobic capacity/endurance;gait, locomotion, and balance  -CT     Functional Limitations in Following Categories (Describe Specific Limitations)  self-care;home management  -CT     Rehab Potential (PT Clinical Summary)  good, to achieve stated therapy goals  -CT     Predicted Duration of Therapy (PT)  length of stay  -CT     Care Plan Review (PT)  evaluation/treatment results reviewed;risks/benefits reviewed;care plan/treatment goals reviewed;current/potential barriers reviewed;patient/other agree to care plan  -CT     Care Plan Review, Other Participant (PT Clinical Impression)  daughter  -CT     Row Name 10/09/19 1517          Physical Therapy Goals    Transfer Goal Selection (PT)  transfer, PT goal 1  -CT     Gait Training Goal Selection (PT)  gait training, PT goal 1   -CT     Row Name 10/09/19 1517          Transfer Goal 1 (PT)    Activity/Assistive Device (Transfer Goal 1, PT)  sit-to-stand/stand-to-sit  -CT     [place] Level/Cues Needed (Transfer Goal 1, PT)  conditional independence without dizziness  -CT     Time Frame (Transfer Goal 1, PT)  by discharge  -CT     Row Name 10/09/19 1517          Gait Training Goal 1 (PT)    Activity/Assistive Device (Gait Training Goal 1, PT)  gait (walking locomotion);assistive device use  -CT     [place] Level (Gait Training Goal 1, PT)  conditional independence without dizziness  -CT     Time Frame (Gait Training Goal 1, PT)  by discharge  -CT     Row Name 10/09/19 1517          Positioning and Restraints    Pre-Treatment Position  in bed  -CT     Post Treatment Position  bed  -CT     In Bed  supine;call light within reach;encouraged to call for assist  -CT       User Key  (r) = Recorded By, (t) = Taken By, (c) = Cosigned By    Initials Name Provider Type    CT Missy Resendiz PT Physical Therapist        Physical Therapy Education     Title: PT OT SLP Therapies (Done)     Topic: Physical Therapy (Done)     Point: Mobility training (Done)     Learning Progress Summary           Patient Acceptance, E,TB, VU by CT at 10/9/2019  3:37 PM                   Point: Home exercise program (Done)     Learning Progress Summary           Patient Acceptance, E,TB, VU by CT at 10/9/2019  3:37 PM                   Point: Body mechanics (Done)     Learning Progress Summary           Patient Acceptance, E,TB, VU by CT at 10/9/2019  3:37 PM                   Point: Precautions (Done)     Learning Progress Summary           Patient Acceptance, E,TB, VU by CT at 10/9/2019  3:37 PM                               User Key     Initials Effective Dates Name Provider Type Discipline    CT 04/03/18 -  Missy Resendiz PT Physical Therapist PT              PT Recommendation and Plan  Anticipated Discharge Disposition (PT): home with assist  Planned Therapy  Interventions (PT Eval): balance training, gait training, home exercise program, transfer training  Therapy Frequency (PT Clinical Impression): 3 times/wk(3-5 times/wk)  Outcome Summary/Treatment Plan (PT)  Anticipated Equipment Needs at Discharge (PT): front wheeled walker  Anticipated Discharge Disposition (PT): home with assist  Plan of Care Reviewed With: patient     Time Calculation:   PT Charges     Row Name 10/09/19 1538             Time Calculation    PT Received On  10/09/19  -CT      PT Goal Re-Cert Due Date  10/23/19  -CT        User Key  (r) = Recorded By, (t) = Taken By, (c) = Cosigned By    Initials Name Provider Type    CT Missy Resendiz, PT Physical Therapist        Therapy Charges for Today     Code Description Service Date Service Provider Modifiers Qty    78679122083 HC PT EVAL MOD COMPLEXITY 4 10/9/2019 Missy Resendiz, PT GP 1                 Missy Resendiz, AMARJIT  10/9/2019

## 2019-10-09 NOTE — PROGRESS NOTES
Discharge Planning Assessment   Luis Fernando     Patient Name: Mariluz Jenkins  MRN: [id number]  Today's Date: 10/9/2019    Admit Date: 10/2/2019      Discharge Plan     Row Name 10/09/19 0950       Plan    Plan  Pt admitted on 10/2/19.  Pt lives at home alone and plans to return home at discharge.  Pt currently does not uitlize home health services.  Pt currently utilizes cane via unknown provider.  Pt requests walker at discharge.  SS will arrange with Physician order.  SS will follow.         BARBARA Negrete

## 2019-10-10 LAB
ALBUMIN SERPL-MCNC: 3.31 G/DL (ref 3.5–5.2)
ALBUMIN/GLOB SERPL: 1.2 G/DL
ALP SERPL-CCNC: 53 U/L (ref 39–117)
ALT SERPL W P-5'-P-CCNC: 11 U/L (ref 1–33)
ANION GAP SERPL CALCULATED.3IONS-SCNC: 9.6 MMOL/L (ref 5–15)
AST SERPL-CCNC: 16 U/L (ref 1–32)
BASOPHILS # BLD AUTO: 0.01 10*3/MM3 (ref 0–0.2)
BASOPHILS NFR BLD AUTO: 0.2 % (ref 0–1.5)
BILIRUB SERPL-MCNC: 0.2 MG/DL (ref 0.2–1.2)
BUN BLD-MCNC: 13 MG/DL (ref 8–23)
BUN/CREAT SERPL: 16.9 (ref 7–25)
CALCIUM SPEC-SCNC: 9.2 MG/DL (ref 8.6–10.5)
CHLORIDE SERPL-SCNC: 103 MMOL/L (ref 98–107)
CO2 SERPL-SCNC: 25.4 MMOL/L (ref 22–29)
CREAT BLD-MCNC: 0.77 MG/DL (ref 0.57–1)
DEPRECATED RDW RBC AUTO: 42.9 FL (ref 37–54)
EOSINOPHIL # BLD AUTO: 0.16 10*3/MM3 (ref 0–0.4)
EOSINOPHIL NFR BLD AUTO: 2.8 % (ref 0.3–6.2)
ERYTHROCYTE [DISTWIDTH] IN BLOOD BY AUTOMATED COUNT: 13.8 % (ref 12.3–15.4)
GFR SERPL CREATININE-BSD FRML MDRD: 73 ML/MIN/1.73
GLOBULIN UR ELPH-MCNC: 2.7 GM/DL
GLUCOSE BLD-MCNC: 103 MG/DL (ref 65–99)
GLUCOSE BLDC GLUCOMTR-MCNC: 105 MG/DL (ref 70–130)
GLUCOSE BLDC GLUCOMTR-MCNC: 108 MG/DL (ref 70–130)
GLUCOSE BLDC GLUCOMTR-MCNC: 125 MG/DL (ref 70–130)
GLUCOSE BLDC GLUCOMTR-MCNC: 159 MG/DL (ref 70–130)
HCT VFR BLD AUTO: 34.1 % (ref 34–46.6)
HGB BLD-MCNC: 10.7 G/DL (ref 12–15.9)
IMM GRANULOCYTES # BLD AUTO: 0.01 10*3/MM3 (ref 0–0.05)
IMM GRANULOCYTES NFR BLD AUTO: 0.2 % (ref 0–0.5)
LYMPHOCYTES # BLD AUTO: 2.04 10*3/MM3 (ref 0.7–3.1)
LYMPHOCYTES NFR BLD AUTO: 36.3 % (ref 19.6–45.3)
MCH RBC QN AUTO: 27.2 PG (ref 26.6–33)
MCHC RBC AUTO-ENTMCNC: 31.4 G/DL (ref 31.5–35.7)
MCV RBC AUTO: 86.5 FL (ref 79–97)
MONOCYTES # BLD AUTO: 0.81 10*3/MM3 (ref 0.1–0.9)
MONOCYTES NFR BLD AUTO: 14.4 % (ref 5–12)
NEUTROPHILS # BLD AUTO: 2.59 10*3/MM3 (ref 1.7–7)
NEUTROPHILS NFR BLD AUTO: 46.1 % (ref 42.7–76)
PLATELET # BLD AUTO: 223 10*3/MM3 (ref 140–450)
PMV BLD AUTO: 10.6 FL (ref 6–12)
POTASSIUM BLD-SCNC: 4.6 MMOL/L (ref 3.5–5.2)
PROT SERPL-MCNC: 6 G/DL (ref 6–8.5)
RBC # BLD AUTO: 3.94 10*6/MM3 (ref 3.77–5.28)
SODIUM BLD-SCNC: 138 MMOL/L (ref 136–145)
WBC NRBC COR # BLD: 5.62 10*3/MM3 (ref 3.4–10.8)

## 2019-10-10 PROCEDURE — 99233 SBSQ HOSP IP/OBS HIGH 50: CPT | Performed by: INTERNAL MEDICINE

## 2019-10-10 PROCEDURE — 85025 COMPLETE CBC W/AUTO DIFF WBC: CPT | Performed by: INTERNAL MEDICINE

## 2019-10-10 PROCEDURE — 25010000002 HEPARIN (PORCINE) PER 1000 UNITS: Performed by: HOSPITALIST

## 2019-10-10 PROCEDURE — 80053 COMPREHEN METABOLIC PANEL: CPT | Performed by: INTERNAL MEDICINE

## 2019-10-10 PROCEDURE — 82962 GLUCOSE BLOOD TEST: CPT

## 2019-10-10 PROCEDURE — 25010000002 HYDRALAZINE PER 20 MG: Performed by: PHYSICIAN ASSISTANT

## 2019-10-10 RX ORDER — CHLORTHALIDONE 50 MG/1
12.5 TABLET ORAL DAILY
Status: DISCONTINUED | OUTPATIENT
Start: 2019-10-10 | End: 2019-10-10

## 2019-10-10 RX ORDER — HYDRALAZINE HYDROCHLORIDE 10 MG/1
10 TABLET, FILM COATED ORAL EVERY 8 HOURS SCHEDULED
Status: DISCONTINUED | OUTPATIENT
Start: 2019-10-10 | End: 2019-10-11 | Stop reason: HOSPADM

## 2019-10-10 RX ORDER — HYDRALAZINE HYDROCHLORIDE 25 MG/1
25 TABLET, FILM COATED ORAL EVERY 8 HOURS PRN
Status: DISCONTINUED | OUTPATIENT
Start: 2019-10-10 | End: 2019-10-11 | Stop reason: HOSPADM

## 2019-10-10 RX ADMIN — LOSARTAN POTASSIUM 100 MG: 50 TABLET, FILM COATED ORAL at 20:12

## 2019-10-10 RX ADMIN — CHLORTHALIDONE 12.5 MG: 50 TABLET ORAL at 11:31

## 2019-10-10 RX ADMIN — ASPIRIN 81 MG: 81 TABLET, COATED ORAL at 09:11

## 2019-10-10 RX ADMIN — CARVEDILOL 6.25 MG: 6.25 TABLET, FILM COATED ORAL at 09:14

## 2019-10-10 RX ADMIN — BUSPIRONE HYDROCHLORIDE 5 MG: 5 TABLET ORAL at 20:11

## 2019-10-10 RX ADMIN — BUSPIRONE HYDROCHLORIDE 5 MG: 5 TABLET ORAL at 09:11

## 2019-10-10 RX ADMIN — HEPARIN SODIUM 5000 UNITS: 5000 INJECTION INTRAVENOUS; SUBCUTANEOUS at 09:12

## 2019-10-10 RX ADMIN — ATORVASTATIN CALCIUM 20 MG: 20 TABLET, FILM COATED ORAL at 20:11

## 2019-10-10 RX ADMIN — MAGNESIUM GLUCONATE 500 MG ORAL TABLET 400 MG: 500 TABLET ORAL at 09:11

## 2019-10-10 RX ADMIN — HYDRALAZINE HYDROCHLORIDE 25 MG: 25 TABLET ORAL at 17:07

## 2019-10-10 RX ADMIN — TRAZODONE HYDROCHLORIDE 50 MG: 50 TABLET ORAL at 20:11

## 2019-10-10 RX ADMIN — SODIUM CHLORIDE, PRESERVATIVE FREE 10 ML: 5 INJECTION INTRAVENOUS at 20:12

## 2019-10-10 RX ADMIN — HEPARIN SODIUM 5000 UNITS: 5000 INJECTION INTRAVENOUS; SUBCUTANEOUS at 20:14

## 2019-10-10 RX ADMIN — AMLODIPINE BESYLATE 10 MG: 10 TABLET ORAL at 09:13

## 2019-10-10 RX ADMIN — FAMOTIDINE 20 MG: 20 TABLET, FILM COATED ORAL at 16:15

## 2019-10-10 RX ADMIN — SODIUM CHLORIDE, PRESERVATIVE FREE 10 ML: 5 INJECTION INTRAVENOUS at 09:12

## 2019-10-10 RX ADMIN — HYDRALAZINE HYDROCHLORIDE 10 MG: 10 TABLET ORAL at 20:11

## 2019-10-10 RX ADMIN — CLONIDINE HYDROCHLORIDE 0.1 MG: 0.1 TABLET ORAL at 21:43

## 2019-10-10 RX ADMIN — CLONIDINE HYDROCHLORIDE 0.1 MG: 0.1 TABLET ORAL at 18:29

## 2019-10-10 RX ADMIN — FAMOTIDINE 20 MG: 20 TABLET, FILM COATED ORAL at 09:11

## 2019-10-10 RX ADMIN — HYDRALAZINE HYDROCHLORIDE 10 MG: 20 INJECTION INTRAMUSCULAR; INTRAVENOUS at 06:47

## 2019-10-10 RX ADMIN — PAROXETINE HYDROCHLORIDE 20 MG: 20 TABLET, FILM COATED ORAL at 09:11

## 2019-10-10 NOTE — PROGRESS NOTES
"  Patient Identification:  Name:  Mariluz Jenkins  Age:  74 y.o.  Sex:  female  :  1945  MRN:  [id number]  Visit Number:  [id number]  Primary care provider:  Delfin Mccarty APRN    Reason for follow-up:    Uncontrolled hypertension.  Orthostatic dizziness due to autonomic neuropathy    Subjective     Orthostatic dizziness-improving.  Blood pressure stayed uncontrolled last night requiring IV hydralazine administration.  Heart rate stable.  No new complaints.      Medication Review:     amLODIPine 10 mg Oral Q24H   aspirin 81 mg Oral Daily   atorvastatin 20 mg Oral Nightly   busPIRone 5 mg Oral BID   carvedilol 6.25 mg Oral BID With Meals   chlorthalidone 12.5 mg Oral Daily   cloNIDine 0.1 mg Oral Q8H   famotidine 20 mg Oral BID AC   heparin (porcine) 5,000 Units Subcutaneous Q12H   insulin aspart 0-7 Units Subcutaneous 4x Daily AC & at Bedtime   losartan 100 mg Oral Nightly   magnesium oxide 400 mg Oral Daily   magnesium sulfate 2 g Intravenous Once   PARoxetine 20 mg Oral Daily   Scopolamine 1 patch Transdermal Q72H   sodium chloride 10 mL Intravenous Q12H   traZODone 50 mg Oral Nightly         Vital Sign Min/Max for last 24 hours  Temp  Min: 97.8 °F (36.6 °C)  Max: 98.8 °F (37.1 °C)   BP  Min: 149/81  Max: 178/74   Pulse  Min: 55  Max: 68   Resp  Min: 18  Max: 18   SpO2  Min: 95 %  Max: 98 %   No Data Recorded   Weight  Min: 61.5 kg (135 lb 8 oz)  Max: 61.5 kg (135 lb 8 oz)     Flowsheet Rows      First Filed Value   Admission Height  167.6 cm (66\") Documented at 10/02/2019 1640   Admission Weight  59 kg (130 lb) Documented at 10/02/2019 1640          Objective       Physical Exam:     General Appearance:    Alert, cooperative, in no acute distress   Head:    Normocephalic, without obvious abnormality, atraumatic   Eyes:           Lids and lashes normal, conjunctivae and sclerae normal, no   icterus, no pallor, corneas clear, PERRLA   Ears:    Bilateral partial deafness   Throat:   No oral " lesions, no thrush, oral mucosa moist   Neck:   No adenopathy, supple, trachea midline, no thyromegaly, no   carotid bruit, no JVD   Back:     No kyphosis present, no scoliosis present, no skin lesions,      erythema or scars, no tenderness to percussion or                   palpation,   range of motion normal   Lungs:     Clear to auscultation,respirations regular, even and                  unlabored    Heart:    Regular rhythm.  Normal heart rate.  No murmurs   Chest Wall:    No abnormalities observed   Abdomen:     Normal bowel sounds, no masses, no organomegaly, soft        non-tender, non-distended, no guarding, no rebound                tenderness   Rectal:     Deferred   Extremities:  No pedal edema           Telemetry:  Normal sinus rhythm.  Heart rate 60s      Labs  Results from last 7 days   Lab Units 10/10/19  0438 10/09/19  0336 10/08/19  0105   WBC 10*3/mm3 5.62 6.93 6.66   HEMOGLOBIN g/dL 10.7* 10.8* 10.5*   HEMATOCRIT % 34.1 34.2 33.4*   PLATELETS 10*3/mm3 223 213 213     Results from last 7 days   Lab Units 10/10/19  0438 10/09/19  0336 10/08/19  0105 10/05/19  0408 10/04/19  0429   SODIUM mmol/L 138 136 134*  --  138   POTASSIUM mmol/L 4.6 4.3 4.5 3.7 3.4*   CHLORIDE mmol/L 103 100 98  --  101   CO2 mmol/L 25.4 24.8 24.4  --  25.9   BUN mg/dL 13 15 14  --  9   CREATININE mg/dL 0.77 0.75 0.79  --  0.69   CALCIUM mg/dL 9.2 9.7 9.2  --  9.3   GLUCOSE mg/dL 103* 123* 100*  --  93     Results from last 7 days   Lab Units 10/10/19  0438 10/09/19  0336   BILIRUBIN mg/dL 0.2 0.2   ALK PHOS U/L 53 58   AST (SGOT) U/L 16 14   ALT (SGPT) U/L 11 9     Results from last 7 days   Lab Units 10/09/19  0336 10/05/19  0408 10/04/19  0429 10/03/19  1130   MAGNESIUM mg/dL 1.8 1.9 1.7 2.0             Results from last 7 days   Lab Units 10/08/19  0105 10/07/19  1743 10/07/19  1157   TROPONIN T ng/mL <0.010 <0.010 <0.010           Radiology: No radiology results for the last day    Assessment      1.  Uncontrolled  hypertension.    2.  Orthostatic dizziness due to orthostatic drop in BP.  Due to autonomic neuropathy.  3.  Sinus bradycardia resolved.  4.  Abnormal ECG due to LVH.  No evidence of ischemia.    Plan     Continue Coreg, clonidine, amlodipine and losartan.  Added chlorthalidone 12.5 mg p.o. daily.      Sharri Ac MD [facility]  10/10/19  9:50 AM

## 2019-10-10 NOTE — PLAN OF CARE
Problem: Patient Care Overview  Goal: Individualization and Mutuality  Outcome: Ongoing (interventions implemented as appropriate)    Goal: Discharge Needs Assessment  Outcome: Ongoing (interventions implemented as appropriate)    Goal: Interprofessional Rounds/Family Conf  Outcome: Ongoing (interventions implemented as appropriate)      Problem: Fall Risk (Adult)  Goal: Absence of Fall  Outcome: Ongoing (interventions implemented as appropriate)      Problem: Skin Injury Risk (Adult)  Goal: Skin Health and Integrity  Outcome: Ongoing (interventions implemented as appropriate)      Problem: Mobility, Physical Impaired (Adult)  Goal: Identify Related Risk Factors and Signs and Symptoms  Outcome: Ongoing (interventions implemented as appropriate)    Goal: Enhanced Mobility Skills  Outcome: Ongoing (interventions implemented as appropriate)    Goal: Enhanced Functional Ability  Outcome: Ongoing (interventions implemented as appropriate)

## 2019-10-10 NOTE — NURSING NOTE
PT. REFUSES BED ALARM. SHE HAS BEEN ENCOURAGED TO PLEASE CALL OUT FOR ASSISTANCE WHEN AMBULATING.

## 2019-10-10 NOTE — PROGRESS NOTES
HOSPITALIST PROGRESS NOTE    Patient Identification:  Name:  Mariluz Jenkins  Age:  74 y.o.  Sex:  female  :  1945  MRN:  [id number]  Visit Number:  [id number]  Primary Care Provider:  Delfin Mccarty APRN    Length of stay:  8     HPI: 74 to female being seen in follow up for diziness and near syncope    Subjective:  The patient was seen earlier today and was resting in bed. She continues to report occasional dizziness but states that symptoms were most severe today when she was standing for her orthostatics (after several minutes of standing). She denies any nausea and/or vomiting. No chest pain. She also reports severe generalized lower extremity weakness.     She also reports that her current symptoms have been present for approximately 1 year but have been becoming more frequent and severe. She states that at home she would become dizzy when moving clothes from the washer to the dryer.    Present during exam: PRASHANTH Bautista and the patient's daughter    Current Hospital Meds:    amLODIPine 10 mg Oral Q24H   aspirin 81 mg Oral Daily   atorvastatin 20 mg Oral Nightly   busPIRone 5 mg Oral BID   cloNIDine 0.1 mg Oral Q8H   famotidine 20 mg Oral BID AC   heparin (porcine) 5,000 Units Subcutaneous Q12H   insulin aspart 0-7 Units Subcutaneous 4x Daily AC & at Bedtime   losartan 100 mg Oral Nightly   magnesium oxide 400 mg Oral Daily   magnesium sulfate 2 g Intravenous Once   PARoxetine 20 mg Oral Daily   Scopolamine 1 patch Transdermal Q72H   sodium chloride 10 mL Intravenous Q12H   traZODone 50 mg Oral Nightly       Vital Signs  Temp:  [97.6 °F (36.4 °C)-98.7 °F (37.1 °C)] 98.2 °F (36.8 °C)  Heart Rate:  [] 78  Resp:  [18] 18  BP: (141-188)/(66-82) 170/74      10/08/19  0500 10/09/19  0500 10/10/19  0500   Weight: 61.8 kg (136 lb 3.2 oz) 60.2 kg (132 lb 11.2 oz) 61.5 kg (135 lb 8 oz)     Body mass index is 21.87 kg/m².    Physical exam:  Physical Exam   Constitutional: She is oriented to person,  place, and time. She appears well-developed and well-nourished. No distress.   HENT:   Head: Normocephalic and atraumatic.   Mouth/Throat: Oropharynx is clear and moist.   Eyes: Conjunctivae and EOM are normal. Pupils are equal, round, and reactive to light.   Neck: Neck supple. No tracheal deviation present. No thyromegaly present.   Cardiovascular: Normal rate and regular rhythm. Exam reveals no gallop and no friction rub.   No murmur heard.  Pulmonary/Chest: Breath sounds normal. No respiratory distress. She has no wheezes. She has no rales.   Abdominal: Soft. Bowel sounds are normal. She exhibits no distension. There is no hepatomegaly. There is no tenderness. There is no guarding.   Musculoskeletal: Normal range of motion. She exhibits no tenderness.   Neurological: She is alert and oriented to person, place, and time. No cranial nerve deficit.   Skin: Skin is warm and dry. No rash noted. No erythema.   Psychiatric: She has a normal mood and affect.     Results Review:    Telemetry reviewed: Sinus bradycardia-Sinus rhythm (50s-60s)    Results from last 7 days   Lab Units 10/10/19  0438 10/09/19  0336 10/08/19  0105   WBC 10*3/mm3 5.62 6.93 6.66   HEMOGLOBIN g/dL 10.7* 10.8* 10.5*   HEMATOCRIT % 34.1 34.2 33.4*   PLATELETS 10*3/mm3 223 213 213     Results from last 7 days   Lab Units 10/10/19  0438 10/09/19  0336 10/08/19  0105 10/05/19  0408 10/04/19  0429   SODIUM mmol/L 138 136 134*  --  138   POTASSIUM mmol/L 4.6 4.3 4.5 3.7 3.4*   CHLORIDE mmol/L 103 100 98  --  101   CO2 mmol/L 25.4 24.8 24.4  --  25.9   BUN mg/dL 13 15 14  --  9   CREATININE mg/dL 0.77 0.75 0.79  --  0.69   CALCIUM mg/dL 9.2 9.7 9.2  --  9.3   GLUCOSE mg/dL 103* 123* 100*  --  93     Results from last 7 days   Lab Units 10/10/19  0438 10/09/19  0336   BILIRUBIN mg/dL 0.2 0.2   ALK PHOS U/L 53 58   AST (SGOT) U/L 16 14   ALT (SGPT) U/L 11 9     Results from last 7 days   Lab Units 10/09/19  0336 10/05/19  0408 10/04/19  0429   MAGNESIUM  mg/dL 1.8 1.9 1.7         Results from last 7 days   Lab Units 10/08/19  0105 10/07/19  1743 10/07/19  1157   TROPONIN T ng/mL <0.010 <0.010 <0.010     MRI brain with and without contrast:  MRI FINDINGS: Exam is technically limited. There is a moderate amount of  motion artifact on the study. The ventricular system and the  subarachnoid spaces show changes of cerebral atrophy. There is no mass  effect or midline shift in the brain. The diffusion-weighted scans show  no recent areas of ischemia. There is no evidence of subdural or  epidural hematoma. There is some gliosis throughout the white matter. On  the post contrasted scans, there were no areas of breakdown of  blood-brain barrier or abnormal enhancement within the brain parenchyma.  There is increased signal indicating inflammation in the mastoid air  cells of the temporal bone bilaterally, but more prominent on the right.     IMPRESSION:  Technically limited exam due to motion artifact. There were  no recent areas of ischemia. There were no focal brain parenchymal  abnormalities. There were inflammatory changes in the mastoid air cells  bilaterally but more prominent on the right.      Assessment/Plan     -Symptomatic bradycardia with near syncope in the setting of bilateral inflammatory changes of the mastoid air cells, right greater than left with mastoid tenderness upon palpation: Continue with fall precautions.  Patient was seen by ENT who does not feel that the patient's symptoms are related to the inflammatory changes noted on MRI.      -Uncontrolled essential hypertension with an episode of tachycardia that may be rebound from holding her clonidine as patient presented with bradycardia: Patient's telemetry again reveals sinus bradycardia.  Discontinue the patient's carvedilol and the chlorthalidone (patient only received 1 dose; may exacerbate her symptoms of dizziness).  Continue with lower dose clonidine in addition to amlodipine and losartan.  I  have also started the patient on low-dose hydralazine.     -Abnormal EKG with T wave inversion: Patient has had several negative troponin T levels.  Patient had a stress test that revealed a normal ejection fraction, normal wall motion abnormality and no evidence of ischemia.  Continue to monitor on telemetry for now.    -Diabetes mellitus type II that is non-insulin-dependent with hemoglobin A1c 5.9%: Given patient's normoglycemia and the propensity for oral glycemic agents to cause prolonged hypoglycemia, will hold metformin for now.  Patient has as needed sliding scale insulin coverage.    The patient is high risk due to the following diagnoses/reasons: Presyncope of unclear etiology, orthostatic hypotension and uncontrolled hypertension (improving).    I discussed with the patient and her daughter extensively at bedside regarding her blood pressure and heart rate control and in detail regarding the different agents, how they work and common side effects.  I encouraged patient to increase her activity and ambulate with staff.  Patient does report that she has a blood pressure cuff at home with heart rate capabilities as well.    I discussed the patients findings and my recommendations with patient, family and nursing staff.    Disposition  Hopefully home in 1-2 days pending BP/HR readings/ongoing orthostasis.      Ekta Odom DO  10/10/19  6:37 PM

## 2019-10-11 ENCOUNTER — APPOINTMENT (OUTPATIENT)
Dept: CT IMAGING | Facility: HOSPITAL | Age: 74
End: 2019-10-11

## 2019-10-11 VITALS
SYSTOLIC BLOOD PRESSURE: 156 MMHG | OXYGEN SATURATION: 99 % | HEART RATE: 64 BPM | WEIGHT: 131.8 LBS | TEMPERATURE: 98.2 F | RESPIRATION RATE: 18 BRPM | DIASTOLIC BLOOD PRESSURE: 78 MMHG | BODY MASS INDEX: 21.18 KG/M2 | HEIGHT: 66 IN

## 2019-10-11 PROBLEM — E83.42 HYPOMAGNESEMIA: Status: RESOLVED | Noted: 2019-10-02 | Resolved: 2019-10-11

## 2019-10-11 PROBLEM — R00.1 SYMPTOMATIC BRADYCARDIA: Status: RESOLVED | Noted: 2019-10-02 | Resolved: 2019-10-11

## 2019-10-11 LAB
ALBUMIN SERPL-MCNC: 3.46 G/DL (ref 3.5–5.2)
ALBUMIN/GLOB SERPL: 1.4 G/DL
ALP SERPL-CCNC: 58 U/L (ref 39–117)
ALT SERPL W P-5'-P-CCNC: 12 U/L (ref 1–33)
ANION GAP SERPL CALCULATED.3IONS-SCNC: 9.9 MMOL/L (ref 5–15)
AST SERPL-CCNC: 13 U/L (ref 1–32)
BASOPHILS # BLD AUTO: 0.01 10*3/MM3 (ref 0–0.2)
BASOPHILS NFR BLD AUTO: 0.2 % (ref 0–1.5)
BILIRUB SERPL-MCNC: 0.2 MG/DL (ref 0.2–1.2)
BUN BLD-MCNC: 12 MG/DL (ref 8–23)
BUN/CREAT SERPL: 16.7 (ref 7–25)
CALCIUM SPEC-SCNC: 9.5 MG/DL (ref 8.6–10.5)
CHLORIDE SERPL-SCNC: 101 MMOL/L (ref 98–107)
CO2 SERPL-SCNC: 25.1 MMOL/L (ref 22–29)
CREAT BLD-MCNC: 0.72 MG/DL (ref 0.57–1)
DEPRECATED RDW RBC AUTO: 42.6 FL (ref 37–54)
EOSINOPHIL # BLD AUTO: 0.13 10*3/MM3 (ref 0–0.4)
EOSINOPHIL NFR BLD AUTO: 2.1 % (ref 0.3–6.2)
ERYTHROCYTE [DISTWIDTH] IN BLOOD BY AUTOMATED COUNT: 13.8 % (ref 12.3–15.4)
GFR SERPL CREATININE-BSD FRML MDRD: 79 ML/MIN/1.73
GLOBULIN UR ELPH-MCNC: 2.5 GM/DL
GLUCOSE BLD-MCNC: 123 MG/DL (ref 65–99)
GLUCOSE BLDC GLUCOMTR-MCNC: 114 MG/DL (ref 70–130)
GLUCOSE BLDC GLUCOMTR-MCNC: 115 MG/DL (ref 70–130)
GLUCOSE BLDC GLUCOMTR-MCNC: 116 MG/DL (ref 70–130)
GLUCOSE BLDC GLUCOMTR-MCNC: 217 MG/DL (ref 70–130)
HCT VFR BLD AUTO: 35.2 % (ref 34–46.6)
HGB BLD-MCNC: 11 G/DL (ref 12–15.9)
IMM GRANULOCYTES # BLD AUTO: 0.01 10*3/MM3 (ref 0–0.05)
IMM GRANULOCYTES NFR BLD AUTO: 0.2 % (ref 0–0.5)
LYMPHOCYTES # BLD AUTO: 2.23 10*3/MM3 (ref 0.7–3.1)
LYMPHOCYTES NFR BLD AUTO: 36.7 % (ref 19.6–45.3)
MCH RBC QN AUTO: 26.8 PG (ref 26.6–33)
MCHC RBC AUTO-ENTMCNC: 31.3 G/DL (ref 31.5–35.7)
MCV RBC AUTO: 85.9 FL (ref 79–97)
MONOCYTES # BLD AUTO: 0.77 10*3/MM3 (ref 0.1–0.9)
MONOCYTES NFR BLD AUTO: 12.7 % (ref 5–12)
NEUTROPHILS # BLD AUTO: 2.93 10*3/MM3 (ref 1.7–7)
NEUTROPHILS NFR BLD AUTO: 48.1 % (ref 42.7–76)
PLATELET # BLD AUTO: 227 10*3/MM3 (ref 140–450)
PMV BLD AUTO: 10.7 FL (ref 6–12)
POTASSIUM BLD-SCNC: 4.3 MMOL/L (ref 3.5–5.2)
PROT SERPL-MCNC: 6 G/DL (ref 6–8.5)
RBC # BLD AUTO: 4.1 10*6/MM3 (ref 3.77–5.28)
SODIUM BLD-SCNC: 136 MMOL/L (ref 136–145)
WBC NRBC COR # BLD: 6.08 10*3/MM3 (ref 3.4–10.8)

## 2019-10-11 PROCEDURE — 99239 HOSP IP/OBS DSCHRG MGMT >30: CPT | Performed by: INTERNAL MEDICINE

## 2019-10-11 PROCEDURE — 82962 GLUCOSE BLOOD TEST: CPT

## 2019-10-11 PROCEDURE — 0 IOVERSOL 74 % SOLUTION: Performed by: INTERNAL MEDICINE

## 2019-10-11 PROCEDURE — 63710000001 INSULIN ASPART PER 5 UNITS: Performed by: PHYSICIAN ASSISTANT

## 2019-10-11 PROCEDURE — 74175 CTA ABDOMEN W/CONTRAST: CPT | Performed by: RADIOLOGY

## 2019-10-11 PROCEDURE — 85025 COMPLETE CBC W/AUTO DIFF WBC: CPT | Performed by: INTERNAL MEDICINE

## 2019-10-11 PROCEDURE — 80053 COMPREHEN METABOLIC PANEL: CPT | Performed by: INTERNAL MEDICINE

## 2019-10-11 PROCEDURE — 94799 UNLISTED PULMONARY SVC/PX: CPT

## 2019-10-11 PROCEDURE — 25010000002 HEPARIN (PORCINE) PER 1000 UNITS: Performed by: HOSPITALIST

## 2019-10-11 PROCEDURE — 74175 CTA ABDOMEN W/CONTRAST: CPT

## 2019-10-11 RX ORDER — HYDRALAZINE HYDROCHLORIDE 10 MG/1
10 TABLET, FILM COATED ORAL EVERY 8 HOURS SCHEDULED
Qty: 90 TABLET | Refills: 0 | Status: SHIPPED | OUTPATIENT
Start: 2019-10-11 | End: 2021-11-11 | Stop reason: SDUPTHER

## 2019-10-11 RX ORDER — CLONIDINE HYDROCHLORIDE 0.1 MG/1
0.1 TABLET ORAL EVERY 8 HOURS SCHEDULED
Qty: 90 TABLET | Refills: 0 | Status: SHIPPED | OUTPATIENT
Start: 2019-10-11 | End: 2021-11-11 | Stop reason: SDUPTHER

## 2019-10-11 RX ORDER — BUSPIRONE HYDROCHLORIDE 5 MG/1
5 TABLET ORAL 2 TIMES DAILY
Qty: 60 TABLET | Refills: 1 | Status: SHIPPED | OUTPATIENT
Start: 2019-10-11 | End: 2021-11-11 | Stop reason: SDUPTHER

## 2019-10-11 RX ORDER — TRAZODONE HYDROCHLORIDE 50 MG/1
50 TABLET ORAL NIGHTLY
Qty: 30 TABLET | Refills: 1 | Status: SHIPPED | OUTPATIENT
Start: 2019-10-11 | End: 2021-11-11 | Stop reason: SDUPTHER

## 2019-10-11 RX ORDER — PAROXETINE HYDROCHLORIDE 20 MG/1
20 TABLET, FILM COATED ORAL EVERY MORNING
Qty: 30 TABLET | Refills: 1 | Status: SHIPPED | OUTPATIENT
Start: 2019-10-11 | End: 2021-11-11 | Stop reason: SDUPTHER

## 2019-10-11 RX ORDER — AMLODIPINE BESYLATE 10 MG/1
10 TABLET ORAL DAILY
Qty: 30 TABLET | Refills: 0 | Status: SHIPPED | OUTPATIENT
Start: 2019-10-11 | End: 2021-11-11

## 2019-10-11 RX ORDER — LOSARTAN POTASSIUM 100 MG/1
100 TABLET ORAL NIGHTLY
Qty: 30 TABLET | Refills: 0 | Status: SHIPPED | OUTPATIENT
Start: 2019-10-11 | End: 2021-11-11 | Stop reason: SDUPTHER

## 2019-10-11 RX ORDER — ATORVASTATIN CALCIUM 20 MG/1
20 TABLET, FILM COATED ORAL NIGHTLY
Qty: 30 TABLET | Refills: 0 | Status: SHIPPED | OUTPATIENT
Start: 2019-10-11 | End: 2021-11-11 | Stop reason: SDUPTHER

## 2019-10-11 RX ORDER — ASPIRIN 81 MG/1
81 TABLET ORAL DAILY
Qty: 30 TABLET | Refills: 1 | Status: SHIPPED | OUTPATIENT
Start: 2019-10-11 | End: 2022-05-16

## 2019-10-11 RX ADMIN — HYDRALAZINE HYDROCHLORIDE 10 MG: 10 TABLET ORAL at 05:41

## 2019-10-11 RX ADMIN — FAMOTIDINE 20 MG: 20 TABLET, FILM COATED ORAL at 08:35

## 2019-10-11 RX ADMIN — SODIUM CHLORIDE, PRESERVATIVE FREE 10 ML: 5 INJECTION INTRAVENOUS at 08:36

## 2019-10-11 RX ADMIN — HYDRALAZINE HYDROCHLORIDE 10 MG: 10 TABLET ORAL at 15:09

## 2019-10-11 RX ADMIN — IOVERSOL 100 ML: 741 INJECTION INTRA-ARTERIAL; INTRAVENOUS at 14:30

## 2019-10-11 RX ADMIN — MAGNESIUM GLUCONATE 500 MG ORAL TABLET 400 MG: 500 TABLET ORAL at 08:35

## 2019-10-11 RX ADMIN — CLONIDINE HYDROCHLORIDE 0.1 MG: 0.1 TABLET ORAL at 05:42

## 2019-10-11 RX ADMIN — FAMOTIDINE 20 MG: 20 TABLET, FILM COATED ORAL at 17:44

## 2019-10-11 RX ADMIN — INSULIN ASPART 2 UNITS: 100 INJECTION, SOLUTION INTRAVENOUS; SUBCUTANEOUS at 18:01

## 2019-10-11 RX ADMIN — BUSPIRONE HYDROCHLORIDE 5 MG: 5 TABLET ORAL at 08:35

## 2019-10-11 RX ADMIN — AMLODIPINE BESYLATE 10 MG: 10 TABLET ORAL at 08:34

## 2019-10-11 RX ADMIN — ASPIRIN 81 MG: 81 TABLET, COATED ORAL at 08:35

## 2019-10-11 RX ADMIN — CLONIDINE HYDROCHLORIDE 0.1 MG: 0.1 TABLET ORAL at 15:08

## 2019-10-11 RX ADMIN — PAROXETINE HYDROCHLORIDE 20 MG: 20 TABLET, FILM COATED ORAL at 08:34

## 2019-10-11 RX ADMIN — HEPARIN SODIUM 5000 UNITS: 5000 INJECTION INTRAVENOUS; SUBCUTANEOUS at 08:35

## 2019-10-11 NOTE — PROGRESS NOTES
"  Patient Identification:  Name:  Mariluz Jenkins  Age:  74 y.o.  Sex:  female  :  1945  MRN:  [id number]  Visit Number:  [id number]  Primary care provider:  Delfin Mccarty APRN    Reason for follow-up:  Uncontrolled hypertension.  Orthostatic dizziness.    Subjective     Orthostatic dizziness-improving.  Blood pressure is still difficult to control.  She is on multiple antihypertensive medications.  No history of chest pain, dizziness or headache.      Medication Review:     amLODIPine 10 mg Oral Q24H   aspirin 81 mg Oral Daily   atorvastatin 20 mg Oral Nightly   busPIRone 5 mg Oral BID   cloNIDine 0.1 mg Oral Q8H   famotidine 20 mg Oral BID AC   heparin (porcine) 5,000 Units Subcutaneous Q12H   hydrALAZINE 10 mg Oral Q8H   insulin aspart 0-7 Units Subcutaneous 4x Daily AC & at Bedtime   losartan 100 mg Oral Nightly   magnesium oxide 400 mg Oral Daily   magnesium sulfate 2 g Intravenous Once   PARoxetine 20 mg Oral Daily   Scopolamine 1 patch Transdermal Q72H   sodium chloride 10 mL Intravenous Q12H   traZODone 50 mg Oral Nightly         Vital Sign Min/Max for last 24 hours  Temp  Min: 97.6 °F (36.4 °C)  Max: 98.7 °F (37.1 °C)   BP  Min: 141/66  Max: 188/68   Pulse  Min: 59  Max: 105   Resp  Min: 18  Max: 20   SpO2  Min: 95 %  Max: 98 %   No Data Recorded   Weight  Min: 59.8 kg (131 lb 12.8 oz)  Max: 59.8 kg (131 lb 12.8 oz)     Flowsheet Rows      First Filed Value   Admission Height  167.6 cm (66\") Documented at 10/02/2019 1640   Admission Weight  59 kg (130 lb) Documented at 10/02/2019 1640          Objective       Physical Exam:     General Appearance:    Alert, cooperative, in no acute distress   Head:    Normocephalic, without obvious abnormality, atraumatic   Eyes:           Lids and lashes normal, conjunctivae and sclerae normal, no   icterus, no pallor, corneas clear, PERRLA   Ears:    Bilateral partial deafness   Throat:   No oral lesions, no thrush, oral mucosa moist   Neck:   No " adenopathy, supple, trachea midline, no thyromegaly, no   carotid bruit, no JVD   Back:     No kyphosis present, no scoliosis present, no skin lesions,      erythema or scars, no tenderness to percussion or                   palpation,   range of motion normal   Lungs:     Clear to auscultation,respirations regular, even and                  unlabored    Heart:    Regular rhythm.  Normal heart rate.  No murmurs   Chest Wall:    No abnormalities observed   Abdomen:     Normal bowel sounds, no masses, no organomegaly, soft        non-tender, non-distended, no guarding, no rebound                tenderness.  No abdominal bruit   Rectal:     Deferred   Extremities:  No pedal edema                       Telemetry:  Normal sinus rhythm.  Heart rate 60s          Labs  Results from last 7 days   Lab Units 10/11/19  0327 10/10/19  0438 10/09/19  0336 10/08/19  0105   WBC 10*3/mm3 6.08 5.62 6.93 6.66   HEMOGLOBIN g/dL 11.0* 10.7* 10.8* 10.5*   HEMATOCRIT % 35.2 34.1 34.2 33.4*   PLATELETS 10*3/mm3 227 223 213 213     Results from last 7 days   Lab Units 10/11/19  0327 10/10/19  0438 10/09/19  0336 10/08/19  0105 10/05/19  0408   SODIUM mmol/L 136 138 136 134*  --    POTASSIUM mmol/L 4.3 4.6 4.3 4.5 3.7   CHLORIDE mmol/L 101 103 100 98  --    CO2 mmol/L 25.1 25.4 24.8 24.4  --    BUN mg/dL 12 13 15 14  --    CREATININE mg/dL 0.72 0.77 0.75 0.79  --    CALCIUM mg/dL 9.5 9.2 9.7 9.2  --    GLUCOSE mg/dL 123* 103* 123* 100*  --      Results from last 7 days   Lab Units 10/11/19  0327 10/10/19  0438 10/09/19  0336   BILIRUBIN mg/dL 0.2 0.2 0.2   ALK PHOS U/L 58 53 58   AST (SGOT) U/L 13 16 14   ALT (SGPT) U/L 12 11 9     Results from last 7 days   Lab Units 10/09/19  0336 10/05/19  0408   MAGNESIUM mg/dL 1.8 1.9             Results from last 7 days   Lab Units 10/08/19  0105 10/07/19  1743 10/07/19  1157   TROPONIN T ng/mL <0.010 <0.010 <0.010           Radiology: No radiology results for the last day    Assessment      1.   Uncontrolled hypertension.    2.  Orthostatic dizziness due to orthostatic drop in BP.  Due to autonomic neuropathy.  3.  Sinus bradycardia resolved.  4.  Abnormal ECG due to LVH.  No evidence of ischemia.         1.  Continue carvedilol, clonidine, amlodipine, losartan and chlorthalidone.  2.  Will obtain CTA of abdominal aorta to rule out renal artery stenosis as cause of hypertension-difficult to control  3.  Discussed with patient and patient's daughter      Sharri Ac MD [facility]  10/11/19  8:28 AM

## 2019-10-11 NOTE — DISCHARGE SUMMARY
Discharge Summary:    Date of Admission: 10/2/2019  Date of Discharge:  10/11/2019    PCP: Delfin Mccarty, PILAR    DISCHARGE DIAGNOSIS  -Near syncope with symptomatic bradycardia and symptomatic orthostatic hypotension  -Uncontrolled essential hypertension  -Abnormal EKG with T wave inversion and negative troponin T levels  -Non-insulin diabetes mellitus, excellent control, A1c 5.9%  -Inflammatory changes in the mastoid air cells bilaterally; likely chronic per ENT evaluation  -Hypomagnesemia, resolved    SECONDARY DIAGNOSES  Past Medical History:   Diagnosis Date   • Diabetes (CMS/[facility])    • Hypertension        CONSULTS   Dr. Ac-Cardiology  ENT    PROCEDURES PERFORMED  Echocardiogram:  Interpretation Summary     · Left ventricular wall thickness is consistent with moderate eccentric hypertrophy.  · Left ventricular systolic function is normal. Estimated EF appears to be in the range of 66 - 70%.  · Left ventricular diastolic dysfunction (grade I) consistent with impaired relaxation.  · Left atrial cavity size is mildly dilated.  · Mild aortic valve regurgitation is present.  · Mild mitral valve regurgitation is present  · Trace to mild tricuspid valve regurgitation is present. No evidence of pulmonary hypertension is present.  · There is no evidence of pericardial effusion.     Stress Test:  Interpretation Summary     · Left ventricular ejection fraction is normal (Calculated EF = 61%).  · Myocardial perfusion imaging indicates a normal myocardial perfusion study with no evidence of ischemia.  · Impressions are consistent with a low risk study.  · Findings consistent with a normal ECG stress test.     Bilateral Carotid Doppler US:  IMPRESSION:  1. Mild/moderate plaque right greater than left carotid systems. No  occlusion.  2. No hemodynamically significant stenosis of either RIGHT or LEFT ICA.  3. Antegrade flow noted both vertebral arteries.     CT angiogram renal:  IMPRESSION:  Atherosclerotic  "vascular calcification but causing no  hemodynamically significant stenosis of the renal arteries.    MRI brain with/without contrast:  IMPRESSION:  Technically limited exam due to motion artifact. There were  no recent areas of ischemia. There were no focal brain parenchymal  abnormalities. There were inflammatory changes in the mastoid air cells  bilaterally but more prominent on the right.    HOSPITAL COURSE  Patient is a 74 y.o. female presented to [facility] complaining of worsening dizziness and pre-syncope.  Please see the admitting history and physical for further details.      Patient was admitted to the telemetry unit. Patient ruled out for MI. She had a normal stress test. Cardiology was consulted and assisted with blood pressure medications. They also ordered CTA-renal which was unremarkable.     She was seen by ENT who though that the MRI findings were likely chronic/inflammatory in nature but did arrange outpatient follow up to continue workup for vertigo. Patient's TSH and d-dimer studies were negative.     Patient became less symptomatic during her hospitalization. She became more ambulatory as well.     Patient was encouraged to check her blood pressure and heart rates upon returning home and is to present her readings to her PCP.     The patient was discharged home in a stable condition with her family.     CONDITION ON DISCHARGE  Stable.      VITAL SIGNS  /77 (BP Location: Right arm, Patient Position: Sitting) Comment: PRASHANTH Bautista notified  Pulse 63   Temp 98.3 °F (36.8 °C) (Oral)   Resp 18   Ht 167.6 cm (66\")   Wt 59.8 kg (131 lb 12.8 oz)   SpO2 97%   BMI 21.27 kg/m²   Objective:  General Appearance:  Comfortable, well-appearing and in no acute distress.    Vital signs: (most recent): Blood pressure 156/78, pulse 64, temperature 98.2 °F (36.8 °C), temperature source Oral, resp. rate 18, height 167.6 cm (66\"), weight 59.8 kg (131 lb 12.8 oz), SpO2 99 %.  Vital signs are " normal.    HEENT: Normal HEENT exam.    Lungs:  Normal effort.  Breath sounds clear to auscultation.  No rales, wheezes or rhonchi.    Heart: Normal rate.  S1 normal and S2 normal.  No murmur, gallop or friction rub.   Chest: Symmetric chest wall expansion.   Abdomen: Abdomen is soft and non-distended.  Bowel sounds are normal.   There is no abdominal tenderness.     Extremities: Normal range of motion.  There is no deformity or dependent edema.    Pulses: Distal pulses are intact.    Neurological: Patient is alert and oriented to person, place and time.  Normal strength.    Skin:  Warm and dry.  No rash or ulceration.             DISCHARGE DISPOSITION   Home or Self Care    DISCHARGE MEDICATIONS     Discharge Medications      New Medications      Instructions Start Date   atorvastatin 20 MG tablet  Commonly known as:  LIPITOR   20 mg, Oral, Nightly      hydrALAZINE 10 MG tablet  Commonly known as:  APRESOLINE   10 mg, Oral, Every 8 Hours Scheduled, Hold for BP <110/60      losartan 100 MG tablet  Commonly known as:  COZAAR   100 mg, Oral, Nightly, Hold for BP <110/60         Changes to Medications      Instructions Start Date   amLODIPine 10 MG tablet  Commonly known as:  NORVASC  What changed:  additional instructions   10 mg, Oral, Daily, Do not take if BP <110/60      cloNIDine 0.1 MG tablet  Commonly known as:  CATAPRES  What changed:    · medication strength  · how much to take  · when to take this  · reasons to take this  · additional instructions   0.1 mg, Oral, Every 8 Hours Scheduled, Do not take if BP <110/60      metFORMIN 1000 MG tablet  Commonly known as:  GLUCOPHAGE  What changed:  These instructions start on 10/13/2019. If you are unsure what to do until then, ask your doctor or other care provider.   1,000 mg, Oral, 2 Times Daily With Meals   Start Date:  10/13/2019        Continue These Medications      Instructions Start Date   aspirin 81 MG EC tablet   81 mg, Oral, Daily      busPIRone 5 MG  tablet  Commonly known as:  BUSPAR   5 mg, Oral, 2 Times Daily      PARoxetine 20 MG tablet  Commonly known as:  PAXIL   20 mg, Oral, Every Morning      traZODone 50 MG tablet  Commonly known as:  DESYREL   50 mg, Oral, Nightly         Stop These Medications    metoprolol tartrate 50 MG tablet  Commonly known as:  LOPRESSOR            DISCHARGE DIET  cardiac diet, diabetic diet    ACTIVITY AT DISCHARGE   activity as tolerated and caution with change in position.      Additional Instructions for the Follow-ups that You Need to Schedule     Discharge Follow-up with PCP   As directed       Currently Documented PCP:    Delfin Mccarty APRN    PCP Phone Number:    [phone]     Follow Up Details:  hospital follow up with BMP and CBC               TEST  RESULTS PENDING AT DISCHARGE     None    Ekta Odom DO  10/11/19  5:03 PM      Time: greater than 30 minutes.

## 2019-10-11 NOTE — PROGRESS NOTES
Discharge Planning Assessment   [facility]     Patient Name: Mariluz Jenkins  MRN: [id number]  Today's Date: 10/11/2019    Admit Date: 10/2/2019        Discharge Plan     Row Name 10/11/19 1622       Plan    Plan  Pt admitted on 10/2/19.  Pt lives at home alone and plans to return home at discharge.  Pt currently does not utilize home health services.  Pt currently utilizes cane via unknown provider.  SS will follow.           HELENA NegreteW

## 2019-10-12 ENCOUNTER — READMISSION MANAGEMENT (OUTPATIENT)
Dept: CALL CENTER | Facility: HOSPITAL | Age: 74
End: 2019-10-12

## 2019-10-12 NOTE — OUTREACH NOTE
Prep Survey      Responses   Facility patient discharged from?  [facility]   Is patient eligible?  Yes   Discharge diagnosis  Symptomatic bradycardia   Does the patient have one of the following disease processes/diagnoses(primary or secondary)?  Other   Does the patient have Home health ordered?  No   Is there a DME ordered?  Yes   What DME was ordered?  requested walker    Medication alerts for this patient  Multiple changes    Prep survey completed?  Yes          Martha Urbano RN

## 2019-10-14 ENCOUNTER — TELEPHONE (OUTPATIENT)
Dept: TELEMETRY | Facility: HOSPITAL | Age: 74
End: 2019-10-14

## 2019-10-15 ENCOUNTER — READMISSION MANAGEMENT (OUTPATIENT)
Dept: CALL CENTER | Facility: HOSPITAL | Age: 74
End: 2019-10-15

## 2019-10-15 NOTE — OUTREACH NOTE
Medical Week 1 Survey      Responses   Facility patient discharged from?  [facility]   Does the patient have one of the following disease processes/diagnoses(primary or secondary)?  Other   Is there a successful TCM telephone encounter documented?  No   Week 1 attempt successful?  Yes   Call start time  0817   Call end time  0821   Discharge diagnosis  Symptomatic bradycardia   Is patient permission given to speak with other caregiver?  Yes   List who call center can speak with  Sangeetha Hebert reviewed with patient/caregiver?  Yes   Is the patient having any side effects they believe may be caused by any medication additions or changes?  No   Does the patient have all medications ordered at discharge?  Yes   Is the patient taking all medications as directed (includes completed medication regime)?  Yes   Does the patient have a primary care provider?   Yes   Does the patient have an appointment with their PCP within 7 days of discharge?  Greater than 7 days   What is preventing the patient from scheduling follow up appointments within 7 days of discharge?  Earlier appointment not available   Nursing Interventions  Verified appointment date/time/provider   Has the patient kept scheduled appointments due by today?  N/A   Comments  PCP 10/21/2019   Has home health visited the patient within 72 hours of discharge?  N/A   What DME was ordered?  Has all the equipoment she needs.   Psychosocial issues?  No   Did the patient receive a copy of their discharge instructions?  Yes   Nursing interventions  Reviewed instructions with patient   What is the patient's perception of their health status since discharge?  Improving   Is the patient/caregiver able to teach back the hierarchy of who to call/visit for symptoms/problems? PCP, Specialist, Home health nurse, Urgent Care, ED, 911  Yes   Week 1 call completed?  Yes          Vaishali Eldridge RN

## 2019-10-24 ENCOUNTER — READMISSION MANAGEMENT (OUTPATIENT)
Dept: CALL CENTER | Facility: HOSPITAL | Age: 74
End: 2019-10-24

## 2019-10-24 NOTE — OUTREACH NOTE
Medical Week 2 Survey      Responses   Facility patient discharged from?  [facility]   Does the patient have one of the following disease processes/diagnoses(primary or secondary)?  Other   Week 2 attempt successful?  Yes   Call start time  1334   Discharge diagnosis  Symptomatic bradycardia   Call end time  1336   Meds reviewed with patient/caregiver?  Yes   Is the patient taking all medications as directed (includes completed medication regime)?  Yes   Does the patient have a primary care provider?   Yes   Has the patient kept scheduled appointments due by today?  No   What is preventing the patient from keeping their appointments?  -- [Pt was sick and didn't get to keep her follow up with PCP. She will call to get it rescheduled.]   Nursing Interventions  Educated on importance of keeping appointment   Psychosocial issues?  No   What is the patient's perception of their health status since discharge?  Improving   Is the patient/caregiver able to teach back signs and symptoms related to disease process for when to call PCP?  Yes   Is the patient/caregiver able to teach back signs and symptoms related to disease process for when to call 911?  Yes   Additional teach back comments  Pt received her flu vaccine while in ER   Week 2 Call Completed?  Yes          Bernadette Lopez RN

## 2019-11-01 ENCOUNTER — READMISSION MANAGEMENT (OUTPATIENT)
Dept: CALL CENTER | Facility: HOSPITAL | Age: 74
End: 2019-11-01

## 2019-11-01 NOTE — OUTREACH NOTE
Medical Week 3 Survey      Responses   Facility patient discharged from?  [facility]   Does the patient have one of the following disease processes/diagnoses(primary or secondary)?  Other   Week 3 attempt successful?  No   Unsuccessful attempts  Attempt 1          Brook Mcelroy RN

## 2019-11-04 ENCOUNTER — READMISSION MANAGEMENT (OUTPATIENT)
Dept: CALL CENTER | Facility: HOSPITAL | Age: 74
End: 2019-11-04

## 2019-11-04 NOTE — OUTREACH NOTE
Medical Week 3 Survey      Responses   Facility patient discharged from?  [facility]   Does the patient have one of the following disease processes/diagnoses(primary or secondary)?  Other   Week 3 attempt successful?  Yes   Call start time  1027   Call end time  1034   Discharge diagnosis  Symptomatic bradycardia   Is patient permission given to speak with other caregiver?  Yes   Person spoke with today (if not patient) and relationship  Shena Rajeevr in law   Meds reviewed with patient/caregiver?  Yes   Is the patient having any side effects they believe may be caused by any medication additions or changes?  No   Does the patient have all medications ordered at discharge?  Yes   Is the patient taking all medications as directed (includes completed medication regime)?  Yes   Does the patient have a primary care provider?   Yes   Has the patient kept scheduled appointments due by today?  Yes   Has home health visited the patient within 72 hours of discharge?  N/A   Psychosocial issues?  No   Did the patient receive a copy of their discharge instructions?  Yes   Nursing interventions  Reviewed instructions with patient   What is the patient's perception of their health status since discharge?  Improving   Is the patient/caregiver able to teach back signs and symptoms related to disease process for when to call PCP?  Yes   Is the patient/caregiver able to teach back signs and symptoms related to disease process for when to call 911?  Yes   Is the patient/caregiver able to teach back the hierarchy of who to call/visit for symptoms/problems? PCP, Specialist, Home health nurse, Urgent Care, ED, 911  Yes   Additional teach back comments  She is doing better with BP/HR.  They will discuss mood and PT/OT orders with PCP.   Week 3 Call Completed?  Yes          Brook Mcelroy RN

## 2020-07-21 NOTE — PLAN OF CARE
Ngozi Bledsoe is a 9 year old male presents complaining of: Fever of 102 F last night and this am. Primary sent him to .     Triage Questions:  1. Do you have a fever, chills, repeated shaking with chills or have you had a fever reducer within the last 12 hours? fever  2.  Temperature 98.1  3. Do you have a new cough, cold symptoms, flu symptoms, runny/stuffy nose, bronchitis, sore throat, difficulty breathing? none  4. Do you have a new onset of extreme fatigue? Yes  5. Do you have any nausea, vomiting, abdominal pain, lack of appetite, diarrhea?  lack of appetite  6. Have you had a sudden onset of loss of taste or smell, muscle pain or headache? none  7. Have you had contact with a known positive COVID-19 case within the last 14 days OR do any of your household members have any of the above symptoms or tested positive for COVID-19 in the last 14 days? No    Other pertinent clinical findings:     Disposition:  Case discussed with provider: No  If patient answered yes to any triage question, temperature >100.4, or medical emergency, then patient is appropriate for URI site.     This patient is appropriate for URI site. Since the patient is appropriate to be seen, the patient is directed to segregated waiting room to wait for available room.    The patient was masked.    Triage RN wearing full Personal Protective Equipment.     Problem: Patient Care Overview  Goal: Plan of Care Review  Outcome: Ongoing (interventions implemented as appropriate)    Goal: Individualization and Mutuality  Outcome: Ongoing (interventions implemented as appropriate)    Goal: Discharge Needs Assessment  Outcome: Ongoing (interventions implemented as appropriate)    Goal: Interprofessional Rounds/Family Conf  Outcome: Ongoing (interventions implemented as appropriate)      Problem: Fall Risk (Adult)  Goal: Identify Related Risk Factors and Signs and Symptoms  Outcome: Ongoing (interventions implemented as appropriate)    Goal: Absence of Fall  Outcome: Ongoing (interventions implemented as appropriate)      Problem: Skin Injury Risk (Adult)  Goal: Identify Related Risk Factors and Signs and Symptoms  Outcome: Ongoing (interventions implemented as appropriate)    Goal: Skin Health and Integrity  Outcome: Ongoing (interventions implemented as appropriate)      Problem: Diabetes, Type 2 (Adult)  Goal: Signs and Symptoms of Listed Potential Problems Will be Absent, Minimized or Managed (Diabetes, Type 2)  Outcome: Ongoing (interventions implemented as appropriate)      Problem: Arrhythmia/Dysrhythmia (Symptomatic) (Adult)  Goal: Signs and Symptoms of Listed Potential Problems Will be Absent, Minimized or Managed (Arrhythmia/Dysrhythmia)  Outcome: Ongoing (interventions implemented as appropriate)

## 2021-02-11 ENCOUNTER — TRANSCRIBE ORDERS (OUTPATIENT)
Dept: ADMINISTRATIVE | Facility: HOSPITAL | Age: 76
End: 2021-02-11

## 2021-02-11 DIAGNOSIS — R94.31 NONSPECIFIC ABNORMAL ELECTROCARDIOGRAM (ECG) (EKG): Primary | ICD-10-CM

## 2021-03-08 ENCOUNTER — APPOINTMENT (OUTPATIENT)
Dept: NUCLEAR MEDICINE | Facility: HOSPITAL | Age: 76
End: 2021-03-08

## 2021-03-08 ENCOUNTER — APPOINTMENT (OUTPATIENT)
Dept: CARDIOLOGY | Facility: HOSPITAL | Age: 76
End: 2021-03-08

## 2021-04-12 ENCOUNTER — HOSPITAL ENCOUNTER (OUTPATIENT)
Dept: CARDIOLOGY | Facility: HOSPITAL | Age: 76
Discharge: HOME | End: 2021-04-12

## 2021-04-12 ENCOUNTER — HOSPITAL ENCOUNTER (OUTPATIENT)
Dept: NUCLEAR MEDICINE | Facility: HOSPITAL | Age: 76
Discharge: HOME | End: 2021-04-12

## 2021-04-12 DIAGNOSIS — R94.31 NONSPECIFIC ABNORMAL ELECTROCARDIOGRAM (ECG) (EKG): ICD-10-CM

## 2021-04-12 PROCEDURE — 78452 HT MUSCLE IMAGE SPECT MULT: CPT

## 2021-04-12 PROCEDURE — 25010000002 REGADENOSON 0.4 MG/5ML SOLUTION: Performed by: FAMILY MEDICINE

## 2021-04-12 PROCEDURE — 0 TECHNETIUM SESTAMIBI: Performed by: FAMILY MEDICINE

## 2021-04-12 PROCEDURE — 93017 CV STRESS TEST TRACING ONLY: CPT

## 2021-04-12 PROCEDURE — 93306 TTE W/DOPPLER COMPLETE: CPT

## 2021-04-12 PROCEDURE — 25010000002 AMINOPHYLLINE PER 250 MG: Performed by: FAMILY MEDICINE

## 2021-04-12 PROCEDURE — 93306 TTE W/DOPPLER COMPLETE: CPT | Performed by: INTERNAL MEDICINE

## 2021-04-12 PROCEDURE — 78452 HT MUSCLE IMAGE SPECT MULT: CPT | Performed by: INTERNAL MEDICINE

## 2021-04-12 PROCEDURE — A9500 TC99M SESTAMIBI: HCPCS | Performed by: FAMILY MEDICINE

## 2021-04-12 PROCEDURE — 93018 CV STRESS TEST I&R ONLY: CPT | Performed by: INTERNAL MEDICINE

## 2021-04-12 RX ORDER — AMINOPHYLLINE DIHYDRATE 25 MG/ML
125 INJECTION, SOLUTION INTRAVENOUS
Status: COMPLETED | OUTPATIENT
Start: 2021-04-12 | End: 2021-04-12

## 2021-04-12 RX ADMIN — TECHNETIUM TC 99M SESTAMIBI 1 DOSE: 1 INJECTION INTRAVENOUS at 08:40

## 2021-04-12 RX ADMIN — TECHNETIUM TC 99M SESTAMIBI 1 DOSE: 1 INJECTION INTRAVENOUS at 10:27

## 2021-04-12 RX ADMIN — AMINOPHYLLINE 125 MG: 25 INJECTION, SOLUTION INTRAVENOUS at 10:30

## 2021-04-12 RX ADMIN — REGADENOSON 0.4 MG: 0.08 INJECTION, SOLUTION INTRAVENOUS at 10:27

## 2021-04-13 LAB
BH CV NUCLEAR PRIOR STUDY: 3
BH CV REST NUCLEAR ISOTOPE DOSE: 10.4 MCI
BH CV STRESS BP STAGE 1: NORMAL
BH CV STRESS BP STAGE 2: NORMAL
BH CV STRESS COMMENTS STAGE 1: NORMAL
BH CV STRESS COMMENTS STAGE 2: NORMAL
BH CV STRESS DOSE REGADENOSON STAGE 1: 0.4
BH CV STRESS DURATION MIN STAGE 1: 0
BH CV STRESS DURATION MIN STAGE 2: 4
BH CV STRESS DURATION SEC STAGE 1: 10
BH CV STRESS DURATION SEC STAGE 2: 0
BH CV STRESS HR STAGE 1: 119
BH CV STRESS HR STAGE 2: 84
BH CV STRESS NUCLEAR ISOTOPE DOSE: 30.3 MCI
BH CV STRESS PROTOCOL 1: NORMAL
BH CV STRESS RECOVERY BP: NORMAL MMHG
BH CV STRESS RECOVERY HR: 84 BPM
BH CV STRESS STAGE 1: 1
BH CV STRESS STAGE 2: 2
LV EF NUC BP: 65 %
MAXIMAL PREDICTED HEART RATE: 146 BPM
PERCENT MAX PREDICTED HR: 81.51 %
STRESS BASELINE BP: NORMAL MMHG
STRESS BASELINE HR: 88 BPM
STRESS PERCENT HR: 96 %
STRESS POST PEAK BP: NORMAL MMHG
STRESS POST PEAK HR: 119 BPM
STRESS TARGET HR: 124 BPM

## 2021-04-14 LAB
BH CV ECHO MEAS - % IVS THICK: -23.7 %
BH CV ECHO MEAS - % LVPW THICK: 2.4 %
BH CV ECHO MEAS - ACS: 2.1 CM
BH CV ECHO MEAS - AI DEC SLOPE: 351 CM/SEC^2
BH CV ECHO MEAS - AI MAX PG: 69.9 MMHG
BH CV ECHO MEAS - AI MAX VEL: 418 CM/SEC
BH CV ECHO MEAS - AI P1/2T: 348.8 MSEC
BH CV ECHO MEAS - AO MAX PG: 10.5 MMHG
BH CV ECHO MEAS - AO MEAN PG: 6 MMHG
BH CV ECHO MEAS - AO ROOT AREA (BSA CORRECTED): 2.1
BH CV ECHO MEAS - AO ROOT AREA: 9.6 CM^2
BH CV ECHO MEAS - AO ROOT DIAM: 3.5 CM
BH CV ECHO MEAS - AO V2 MAX: 162 CM/SEC
BH CV ECHO MEAS - AO V2 MEAN: 116 CM/SEC
BH CV ECHO MEAS - AO V2 VTI: 37 CM
BH CV ECHO MEAS - BSA(HAYCOCK): 1.7 M^2
BH CV ECHO MEAS - BSA: 1.7 M^2
BH CV ECHO MEAS - BZI_BMI: 21.1 KILOGRAMS/M^2
BH CV ECHO MEAS - BZI_METRIC_HEIGHT: 167.6 CM
BH CV ECHO MEAS - BZI_METRIC_WEIGHT: 59.4 KG
BH CV ECHO MEAS - EDV(CUBED): 118.4 ML
BH CV ECHO MEAS - EDV(MOD-SP4): 67.1 ML
BH CV ECHO MEAS - EDV(TEICH): 113.4 ML
BH CV ECHO MEAS - EF(CUBED): 71.1 %
BH CV ECHO MEAS - EF(MOD-SP4): 62 %
BH CV ECHO MEAS - EF(TEICH): 62.6 %
BH CV ECHO MEAS - ESV(CUBED): 34.2 ML
BH CV ECHO MEAS - ESV(MOD-SP4): 25.5 ML
BH CV ECHO MEAS - ESV(TEICH): 42.4 ML
BH CV ECHO MEAS - FS: 33.9 %
BH CV ECHO MEAS - IVS/LVPW: 1.2
BH CV ECHO MEAS - IVSD: 1.5 CM
BH CV ECHO MEAS - IVSS: 1.1 CM
BH CV ECHO MEAS - LA DIMENSION: 5 CM
BH CV ECHO MEAS - LA/AO: 1.4
BH CV ECHO MEAS - LV DIASTOLIC VOL/BSA (35-75): 40.2 ML/M^2
BH CV ECHO MEAS - LV MASS(C)D: 268.1 GRAMS
BH CV ECHO MEAS - LV MASS(C)DI: 160.4 GRAMS/M^2
BH CV ECHO MEAS - LV MASS(C)S: 120.2 GRAMS
BH CV ECHO MEAS - LV MASS(C)SI: 72 GRAMS/M^2
BH CV ECHO MEAS - LV SYSTOLIC VOL/BSA (12-30): 15.3 ML/M^2
BH CV ECHO MEAS - LVIDD: 4.9 CM
BH CV ECHO MEAS - LVIDS: 3.2 CM
BH CV ECHO MEAS - LVLD AP4: 6.8 CM
BH CV ECHO MEAS - LVLS AP4: 4.6 CM
BH CV ECHO MEAS - LVOT AREA (M): 2.5 CM^2
BH CV ECHO MEAS - LVOT AREA: 2.5 CM^2
BH CV ECHO MEAS - LVOT DIAM: 1.8 CM
BH CV ECHO MEAS - LVPWD: 1.2 CM
BH CV ECHO MEAS - LVPWS: 1.3 CM
BH CV ECHO MEAS - MV A MAX VEL: 102 CM/SEC
BH CV ECHO MEAS - MV E MAX VEL: 78.8 CM/SEC
BH CV ECHO MEAS - MV E/A: 0.77
BH CV ECHO MEAS - PA ACC TIME: 0.1 SEC
BH CV ECHO MEAS - PA PR(ACCEL): 36.3 MMHG
BH CV ECHO MEAS - RAP SYSTOLE: 10 MMHG
BH CV ECHO MEAS - RVSP: 66.9 MMHG
BH CV ECHO MEAS - SI(AO): 213 ML/M^2
BH CV ECHO MEAS - SI(CUBED): 50.4 ML/M^2
BH CV ECHO MEAS - SI(MOD-SP4): 24.9 ML/M^2
BH CV ECHO MEAS - SI(TEICH): 42.5 ML/M^2
BH CV ECHO MEAS - SV(AO): 356 ML
BH CV ECHO MEAS - SV(CUBED): 84.2 ML
BH CV ECHO MEAS - SV(MOD-SP4): 41.6 ML
BH CV ECHO MEAS - SV(TEICH): 71 ML
BH CV ECHO MEAS - TR MAX VEL: 377 CM/SEC
MAXIMAL PREDICTED HEART RATE: 146 BPM
STRESS TARGET HR: 124 BPM

## 2021-11-11 ENCOUNTER — OFFICE VISIT (OUTPATIENT)
Dept: FAMILY MEDICINE CLINIC | Facility: CLINIC | Age: 76
End: 2021-11-11

## 2021-11-11 VITALS
TEMPERATURE: 97.3 F | SYSTOLIC BLOOD PRESSURE: 162 MMHG | HEIGHT: 67 IN | HEART RATE: 80 BPM | RESPIRATION RATE: 15 BRPM | OXYGEN SATURATION: 99 % | WEIGHT: 114 LBS | BODY MASS INDEX: 17.89 KG/M2 | DIASTOLIC BLOOD PRESSURE: 72 MMHG

## 2021-11-11 DIAGNOSIS — F17.200 SMOKER: ICD-10-CM

## 2021-11-11 DIAGNOSIS — I10 ESSENTIAL HYPERTENSION: ICD-10-CM

## 2021-11-11 DIAGNOSIS — J44.9 COPD MIXED TYPE (HCC): ICD-10-CM

## 2021-11-11 DIAGNOSIS — Z87.891 PERSONAL HISTORY OF NICOTINE DEPENDENCE: ICD-10-CM

## 2021-11-11 DIAGNOSIS — I70.90 ATHEROSCLEROSIS: ICD-10-CM

## 2021-11-11 DIAGNOSIS — E11.51 TYPE 2 DIABETES MELLITUS WITH DIABETIC PERIPHERAL ANGIOPATHY WITHOUT GANGRENE, WITHOUT LONG-TERM CURRENT USE OF INSULIN (HCC): ICD-10-CM

## 2021-11-11 DIAGNOSIS — Z12.12 ENCOUNTER FOR SCREENING FOR MALIGNANT NEOPLASM OF RECTUM: ICD-10-CM

## 2021-11-11 DIAGNOSIS — E78.2 MIXED HYPERLIPIDEMIA: ICD-10-CM

## 2021-11-11 DIAGNOSIS — F41.8 DEPRESSION WITH ANXIETY: ICD-10-CM

## 2021-11-11 DIAGNOSIS — Z00.00 HEALTHCARE MAINTENANCE: Primary | ICD-10-CM

## 2021-11-11 DIAGNOSIS — M81.0 AGE-RELATED OSTEOPOROSIS WITHOUT CURRENT PATHOLOGICAL FRACTURE: ICD-10-CM

## 2021-11-11 DIAGNOSIS — Z12.31 ENCOUNTER FOR SCREENING MAMMOGRAM FOR BREAST CANCER: ICD-10-CM

## 2021-11-11 PROBLEM — E11.9 TYPE 2 DIABETES MELLITUS, WITHOUT LONG-TERM CURRENT USE OF INSULIN (HCC): Status: ACTIVE | Noted: 2021-11-11

## 2021-11-11 PROCEDURE — 84443 ASSAY THYROID STIM HORMONE: CPT | Performed by: GENERAL PRACTICE

## 2021-11-11 PROCEDURE — 80061 LIPID PANEL: CPT | Performed by: GENERAL PRACTICE

## 2021-11-11 PROCEDURE — 80053 COMPREHEN METABOLIC PANEL: CPT | Performed by: GENERAL PRACTICE

## 2021-11-11 PROCEDURE — 82306 VITAMIN D 25 HYDROXY: CPT | Performed by: GENERAL PRACTICE

## 2021-11-11 PROCEDURE — 85025 COMPLETE CBC W/AUTO DIFF WBC: CPT | Performed by: GENERAL PRACTICE

## 2021-11-11 PROCEDURE — 99204 OFFICE O/P NEW MOD 45 MIN: CPT | Performed by: GENERAL PRACTICE

## 2021-11-11 PROCEDURE — 83036 HEMOGLOBIN GLYCOSYLATED A1C: CPT | Performed by: GENERAL PRACTICE

## 2021-11-11 PROCEDURE — 81003 URINALYSIS AUTO W/O SCOPE: CPT | Performed by: GENERAL PRACTICE

## 2021-11-11 PROCEDURE — 86803 HEPATITIS C AB TEST: CPT | Performed by: GENERAL PRACTICE

## 2021-11-11 PROCEDURE — 82607 VITAMIN B-12: CPT | Performed by: GENERAL PRACTICE

## 2021-11-11 RX ORDER — MECLIZINE HCL 12.5 MG/1
12.5 TABLET ORAL 2 TIMES DAILY
COMMUNITY

## 2021-11-11 RX ORDER — ATORVASTATIN CALCIUM 40 MG/1
40 TABLET, FILM COATED ORAL NIGHTLY
Qty: 30 TABLET | Refills: 5 | Status: SHIPPED | OUTPATIENT
Start: 2021-11-11 | End: 2022-06-24 | Stop reason: SDUPTHER

## 2021-11-11 RX ORDER — PAROXETINE HYDROCHLORIDE 20 MG/1
20 TABLET, FILM COATED ORAL EVERY MORNING
Qty: 30 TABLET | Refills: 5 | Status: SHIPPED | OUTPATIENT
Start: 2021-11-11 | End: 2021-11-11 | Stop reason: SDUPTHER

## 2021-11-11 RX ORDER — CLONIDINE HYDROCHLORIDE 0.1 MG/1
0.1 TABLET ORAL 2 TIMES DAILY
Qty: 60 TABLET | Refills: 5 | Status: SHIPPED | OUTPATIENT
Start: 2021-11-11 | End: 2022-02-15

## 2021-11-11 RX ORDER — BUSPIRONE HYDROCHLORIDE 5 MG/1
5 TABLET ORAL 2 TIMES DAILY
Qty: 60 TABLET | Refills: 5 | Status: SHIPPED | OUTPATIENT
Start: 2021-11-11

## 2021-11-11 RX ORDER — PAROXETINE 30 MG/1
30 TABLET, FILM COATED ORAL EVERY MORNING
Qty: 30 TABLET | Refills: 5 | Status: SHIPPED | OUTPATIENT
Start: 2021-11-11 | End: 2022-07-05 | Stop reason: SDUPTHER

## 2021-11-11 RX ORDER — LOSARTAN POTASSIUM 100 MG/1
100 TABLET ORAL NIGHTLY
Qty: 30 TABLET | Refills: 5 | Status: SHIPPED | OUTPATIENT
Start: 2021-11-11 | End: 2022-07-01

## 2021-11-11 RX ORDER — ATORVASTATIN CALCIUM 20 MG/1
20 TABLET, FILM COATED ORAL NIGHTLY
Qty: 30 TABLET | Refills: 5 | Status: SHIPPED | OUTPATIENT
Start: 2021-11-11 | End: 2021-11-11 | Stop reason: SDUPTHER

## 2021-11-11 RX ORDER — TRAZODONE HYDROCHLORIDE 100 MG/1
100 TABLET ORAL NIGHTLY
Qty: 30 TABLET | Refills: 5 | Status: SHIPPED | OUTPATIENT
Start: 2021-11-11 | End: 2022-08-25 | Stop reason: SDUPTHER

## 2021-11-11 RX ORDER — HYDRALAZINE HYDROCHLORIDE 10 MG/1
10 TABLET, FILM COATED ORAL EVERY 8 HOURS SCHEDULED
Qty: 90 TABLET | Refills: 5 | Status: SHIPPED | OUTPATIENT
Start: 2021-11-11 | End: 2022-02-15 | Stop reason: SDUPTHER

## 2021-11-12 DIAGNOSIS — E11.51 TYPE 2 DIABETES MELLITUS WITH DIABETIC PERIPHERAL ANGIOPATHY WITHOUT GANGRENE, WITHOUT LONG-TERM CURRENT USE OF INSULIN (HCC): ICD-10-CM

## 2021-11-12 LAB
25(OH)D3 SERPL-MCNC: 29 NG/ML (ref 30–100)
ALBUMIN SERPL-MCNC: 4.1 G/DL (ref 3.5–5.2)
ALBUMIN/GLOB SERPL: 1.6 G/DL
ALP SERPL-CCNC: 53 U/L (ref 39–117)
ALT SERPL W P-5'-P-CCNC: 7 U/L (ref 1–33)
ANION GAP SERPL CALCULATED.3IONS-SCNC: 10.7 MMOL/L (ref 5–15)
AST SERPL-CCNC: 13 U/L (ref 1–32)
BASOPHILS # BLD AUTO: 0.02 10*3/MM3 (ref 0–0.2)
BASOPHILS NFR BLD AUTO: 0.2 % (ref 0–1.5)
BILIRUB SERPL-MCNC: 0.3 MG/DL (ref 0–1.2)
BUN SERPL-MCNC: 8 MG/DL (ref 8–23)
BUN/CREAT SERPL: 11.3 (ref 7–25)
CALCIUM SPEC-SCNC: 9.5 MG/DL (ref 8.6–10.5)
CHLORIDE SERPL-SCNC: 106 MMOL/L (ref 98–107)
CHOLEST SERPL-MCNC: 116 MG/DL (ref 0–200)
CO2 SERPL-SCNC: 25.3 MMOL/L (ref 22–29)
CREAT SERPL-MCNC: 0.71 MG/DL (ref 0.57–1)
DEPRECATED RDW RBC AUTO: 39 FL (ref 37–54)
EOSINOPHIL # BLD AUTO: 0.1 10*3/MM3 (ref 0–0.4)
EOSINOPHIL NFR BLD AUTO: 1.2 % (ref 0.3–6.2)
ERYTHROCYTE [DISTWIDTH] IN BLOOD BY AUTOMATED COUNT: 13.2 % (ref 12.3–15.4)
GFR SERPL CREATININE-BSD FRML MDRD: 80 ML/MIN/1.73
GLOBULIN UR ELPH-MCNC: 2.6 GM/DL
GLUCOSE SERPL-MCNC: 98 MG/DL (ref 65–99)
HBA1C MFR BLD: 5.68 % (ref 4.8–5.6)
HCT VFR BLD AUTO: 34.4 % (ref 34–46.6)
HCV AB SER DONR QL: NORMAL
HDLC SERPL-MCNC: 48 MG/DL (ref 40–60)
HGB BLD-MCNC: 11.1 G/DL (ref 12–15.9)
IMM GRANULOCYTES # BLD AUTO: 0.03 10*3/MM3 (ref 0–0.05)
IMM GRANULOCYTES NFR BLD AUTO: 0.4 % (ref 0–0.5)
LDLC SERPL CALC-MCNC: 50 MG/DL (ref 0–100)
LDLC/HDLC SERPL: 1.02 {RATIO}
LYMPHOCYTES # BLD AUTO: 1.75 10*3/MM3 (ref 0.7–3.1)
LYMPHOCYTES NFR BLD AUTO: 20.7 % (ref 19.6–45.3)
MCH RBC QN AUTO: 26.5 PG (ref 26.6–33)
MCHC RBC AUTO-ENTMCNC: 32.3 G/DL (ref 31.5–35.7)
MCV RBC AUTO: 82.1 FL (ref 79–97)
MONOCYTES # BLD AUTO: 0.57 10*3/MM3 (ref 0.1–0.9)
MONOCYTES NFR BLD AUTO: 6.7 % (ref 5–12)
NEUTROPHILS NFR BLD AUTO: 5.98 10*3/MM3 (ref 1.7–7)
NEUTROPHILS NFR BLD AUTO: 70.8 % (ref 42.7–76)
NRBC BLD AUTO-RTO: 0.1 /100 WBC (ref 0–0.2)
PLATELET # BLD AUTO: 250 10*3/MM3 (ref 140–450)
PMV BLD AUTO: 11.2 FL (ref 6–12)
POTASSIUM SERPL-SCNC: 4.1 MMOL/L (ref 3.5–5.2)
PROT SERPL-MCNC: 6.7 G/DL (ref 6–8.5)
RBC # BLD AUTO: 4.19 10*6/MM3 (ref 3.77–5.28)
SODIUM SERPL-SCNC: 142 MMOL/L (ref 136–145)
TRIGL SERPL-MCNC: 96 MG/DL (ref 0–150)
TSH SERPL DL<=0.05 MIU/L-ACNC: 1.53 UIU/ML (ref 0.27–4.2)
VIT B12 BLD-MCNC: 232 PG/ML (ref 211–946)
VLDLC SERPL-MCNC: 18 MG/DL (ref 5–40)
WBC # BLD AUTO: 8.45 10*3/MM3 (ref 3.4–10.8)

## 2021-11-12 RX ORDER — LANOLIN ALCOHOL/MO/W.PET/CERES
1000 CREAM (GRAM) TOPICAL DAILY
Qty: 30 TABLET | Refills: 5 | Status: SHIPPED | OUTPATIENT
Start: 2021-11-12

## 2021-11-12 RX ORDER — ERGOCALCIFEROL 1.25 MG/1
50000 CAPSULE ORAL WEEKLY
Qty: 4 CAPSULE | Refills: 5 | Status: SHIPPED | OUTPATIENT
Start: 2021-11-12 | End: 2022-06-24 | Stop reason: SDUPTHER

## 2021-11-12 NOTE — PROGRESS NOTES
Subjective      Mariluz Jenkins is a 76 y.o. female.      Chief Complaint  This 76-year-old woman presents today to establish care.     History of Present Illness      Type 2 Diabetes Mellitus  She gives an approximate 35-year history of type 2 diabetes mellitus.  She is currently maintained on metformin 1000 twice daily as well as ASA 81 daily.  She admits to bilateral visual blurring and numbness of both feet.  She denies any polydipsia or polyuria and there is no history of any skin breakdown.  She has lost approximately 50 pounds over the last 5 years with a steady improvement in her glucose.  Duplex Doppler ultrasound of the carotid arteries performed on 10/3/2019 revealed mild to moderate plaque formation worse on the right.  No hemodynamically significant stenosis was noted.  CTA of the abdomen performed on 10/11/2019 was reported as showing atherosclerotic vascular calcifications.  No hemodynamically significant stenosis was noted.  Nuclear stress testing performed on 4/12/2021 revealed no evidence of inducible ischemia with a calculated EF of 65%.  She has had no recent labs and her last diabetic eye exam was more than 3 years ago     Hyperlipidemia  She is currently maintained on atorvastatin 20 daily.  She denies any apparent side effects     Essential Hypertension  She is currently maintained on losartan 100 daily, metoprolol 25 twice daily, hydralazine 10 3 times daily, and clonidine 0.1 twice daily.  She admits to shortness of breath with exertion with intermittent lower extremity edema but denies any orthopnea or PND and there is no history of any chest pain, palpitations, or lightheadedness.  She admits to bilateral visual blurring and numbness of both feet but denies any other visual disturbances and there is no history of any weakness or difficulty talking.  She has long standing hearing loss but has no difficulty understanding what is said to her provided she can hear it     Depression With  Anxiety  She gives a more than 5 year history of depressed mood, anhedonia, nervousness, decreased appetite with weight loss, and insomnia.  She dates the symptoms to the death of her  from colon cancer.  There is no history of any suicidal ideation.  She is currently maintained on paroxetine 30 daily, buspirone 5 twice daily, and trazodone 100 every evening     The following portions of the patient's history were reviewed and updated as appropriate: allergies, current medications, past family history, past medical history, past social history, past surgical history and problem list.     Review of Systems   Constitutional: Positive for fatigue. Negative for appetite change, chills, fever and unexpected weight change.   HENT: Positive for hearing loss. Negative for congestion, ear pain, rhinorrhea, sneezing, sore throat and voice change.    Eyes: Negative for visual disturbance.   Respiratory: Positive for shortness of breath. Negative for cough and wheezing.    Cardiovascular: Positive for leg swelling. Negative for chest pain and palpitations.   Gastrointestinal: Positive for constipation and nausea. Negative for abdominal pain, blood in stool, diarrhea and vomiting.   Endocrine: Negative for polydipsia and polyuria.   Genitourinary: Negative for difficulty urinating, dysuria, frequency, hematuria and urgency.   Musculoskeletal: Negative for arthralgias, back pain, joint swelling, myalgias and neck pain.   Skin: Negative for rash.   Neurological: Positive for weakness (generalized) and numbness. Negative for tremors and headaches.   Psychiatric/Behavioral: Positive for decreased concentration, dysphoric mood and sleep disturbance. Negative for suicidal ideas. The patient is nervous/anxious.             Objective      Physical Exam  Constitutional:       General: She is not in acute distress.     Appearance: Normal appearance. She is well-developed. She is not diaphoretic.      Comments: Accompanied by her  daughter.  Alert and in fair spirits.  Hard of hearing.  No apparent distress. No pallor, jaundice, diaphoresis, or cyanosis.     HENT:      Head: Atraumatic.      Right Ear: Tympanic membrane, ear canal and external ear normal. Decreased hearing noted.      Left Ear: Tympanic membrane, ear canal and external ear normal. Decreased hearing noted.   Eyes:      Conjunctiva/sclera: Conjunctivae normal.   Neck:      Thyroid: No thyroid mass or thyromegaly.      Vascular: No carotid bruit or JVD.      Trachea: Trachea normal. No tracheal deviation.   Cardiovascular:      Rate and Rhythm: Normal rate and regular rhythm.      Pulses:           Dorsalis pedis pulses are 1+ on the right side and 1+ on the left side.        Posterior tibial pulses are 1+ on the right side and 1+ on the left side.      Heart sounds: Normal heart sounds, S1 normal and S2 normal. No murmur heard.  No gallop.    Pulmonary:      Effort: Pulmonary effort is normal.      Breath sounds: Examination of the right-lower field reveals decreased breath sounds. Examination of the left-lower field reveals decreased breath sounds. Decreased breath sounds and wheezing (diffuse - mild) present. No rales.   Chest:   Breasts:      Right: No supraclavicular adenopathy.      Left: No supraclavicular adenopathy.         Abdominal:      General: Bowel sounds are normal. There is no distension or abdominal bruit.      Palpations: Abdomen is soft. There is no hepatomegaly, splenomegaly or mass.      Tenderness: There is no abdominal tenderness.      Hernia: No hernia is present.   Musculoskeletal:      Right lower leg: No edema.      Left lower leg: No edema.   Feet:      Right foot:      Skin integrity: Skin integrity normal.      Left foot:      Skin integrity: Skin integrity normal.   Lymphadenopathy:      Head:      Right side of head: No submental, submandibular, tonsillar, preauricular, posterior auricular or occipital adenopathy.      Left side of head: No  submental, submandibular, tonsillar, preauricular, posterior auricular or occipital adenopathy.      Cervical: No cervical adenopathy.      Upper Body:      Right upper body: No supraclavicular adenopathy.      Left upper body: No supraclavicular adenopathy.   Skin:     General: Skin is warm.      Coloration: Skin is not cyanotic, jaundiced or pale.      Findings: No rash.      Nails: There is no clubbing.   Neurological:      Mental Status: She is alert and oriented to person, place, and time.      Cranial Nerves: No cranial nerve deficit.      Sensory: Sensory deficit (decreased vibration sense toes of both feet) present.      Motor: No tremor.      Coordination: Coordination normal.      Gait: Gait normal.   Psychiatric:         Attention and Perception: Attention normal.         Mood and Affect: Mood normal.         Speech: Speech normal.         Behavior: Behavior normal.         Thought Content: Thought content normal.               Assessment/Plan              Problems Addressed this Visit                       Cardiac and Vasculature      Atherosclerosis  Reminded regarding risk factor modification with an emphasis on tobacco cessation.  Continue low-dose ASA      Essential hypertension   Hypertension: elevated today. Evidence of target organ damage: evidence of atherosclerosis on previous imaging.  Encouraged to continue to work on diet and exercise plan.   Continue current medication for now  We'll consider replacing metoprolol with the succinate formulation and titrating  We'll also consider titrating hydralazine with discontinuation of clonidine      Relevant Medications      hydrALAZINE (APRESOLINE) 10 MG tablet      losartan (COZAAR) 100 MG tablet      cloNIDine (CATAPRES) 0.1 MG tablet      metoprolol tartrate (LOPRESSOR) 25 MG tablet      Other Relevant Orders      CBC & Differential      Comprehensive Metabolic Panel      Mixed hyperlipidemia  As above.  Atorvastatin will be titrated to 40 daily       Relevant Medications      atorvastatin (LIPITOR) 40 MG tablet      Other Relevant Orders      Comprehensive Metabolic Panel      Lipid Panel      TSH               Endocrine and Metabolic      Type 2 diabetes mellitus with diabetic peripheral angiopathy without gangrene, without long-term current use of insulin (HCC)  Diabetes mellitus Type II, under unknown control.   Encouraged to continue to pursue ADA diet  Encouraged aerobic exercise.  Given her CV risk recommended the addition of empagliflozin to metformin in the form of synjardy XR.  She has no prescription plan at present but will look into Medicare part D as well as a low income subsidy  Prescription written for diabetic shoes  Updated labs drawn.  We'll arrange an updated diabetic eye exam      Relevant Medications      metFORMIN (GLUCOPHAGE) 1000 MG tablet      Other Relevant Orders      Hemoglobin A1c      Vitamin B12      MicroAlbumin, Urine, Random - Urine, Clean Catch      Ambulatory Referral for Diabetic Eye Exam-Optometry               Health Encounters      Healthcare maintenance  Patient has already received a flu shot fall.  Recommended a third dose of the Moderna COVID-19 vaccine.  We'll obtain a copy of her immunization record  We'll arrange an updated mammogram along with a DEXA scan and low-dose CT of the chest  Patient would like to pursue a Cologuard in place of a colonoscopy and this will be arranged      Relevant Orders      Vitamin D 25 Hydroxy      Hepatitis C Antibody      Mammo Screening Digital Tomosynthesis Bilateral With CAD      DEXA Bone Density Axial       CT Chest Low Dose Cancer Screening WO      Cologuard - Stool, Per Rectum               Mental Health      Depression with anxiety  Supportive therapy  Continue current medication  We'll arrange a psychiatric assessment with Nadine QUEZADA  Encouraged to report if any worse or if any new symptoms or concerns.      Relevant Medications      busPIRone (BUSPAR) 5 MG tablet       traZODone (DESYREL) 100 MG tablet      PARoxetine (PAXIL) 30 MG tablet      Other Relevant Orders      TSH               Pulmonary and Pneumonias      COPD mixed type (HCC)  Reminded of the importance of smoking cessation               Tobacco      Smoker                    Diagnoses        Codes Comments     Healthcare maintenance    -  Primary ICD-10-CM: Z00.00  ICD-9-CM: V70.0       Mixed hyperlipidemia     ICD-10-CM: E78.2  ICD-9-CM: 272.2       Essential hypertension     ICD-10-CM: I10  ICD-9-CM: 401.9       Type 2 diabetes mellitus with diabetic peripheral angiopathy without gangrene, without long-term current use of insulin (HCC)     ICD-10-CM: E11.51  ICD-9-CM: 250.70, 443.81       Atherosclerosis     ICD-10-CM: I70.90  ICD-9-CM: 440.9       Depression with anxiety     ICD-10-CM: F41.8  ICD-9-CM: 300.4       Age-related osteoporosis without current pathological fracture      ICD-10-CM: M81.0  ICD-9-CM: 733.01       Encounter for screening mammogram for breast cancer     ICD-10-CM: Z12.31  ICD-9-CM: V76.12       Smoker     ICD-10-CM: F17.200  ICD-9-CM: 305.1       Personal history of nicotine dependence      ICD-10-CM: Z87.891  ICD-9-CM: V15.82       Encounter for screening for malignant neoplasm of rectum      ICD-10-CM: Z12.12  ICD-9-CM: V76.41       COPD mixed type (HCC)     ICD-10-CM: J44.9  ICD-9-CM: 496

## 2021-11-17 ENCOUNTER — PATIENT ROUNDING (BHMG ONLY) (OUTPATIENT)
Dept: FAMILY MEDICINE CLINIC | Facility: CLINIC | Age: 76
End: 2021-11-17

## 2021-11-17 NOTE — PROGRESS NOTES
November 17, 2021    Hello, may I speak with Mariluz Jenkins?    My name is Sallie Chavarria       I am  with [facility]  [address] [place] [id number] [phone].    Before we get started may I verify your date of birth? 1945    I am calling to officially welcome you to our practice and ask about your recent visit. Is this a good time to talk? yes    Tell me about your visit with us. What things went well?  I enjoyed my visit. Dr. Middleton was wonderful.       We're always looking for ways to make our patients' experiences even better. Do you have recommendations on ways we may improve?  no    Overall were you satisfied with your first visit to our practice? yes       I appreciate you taking the time to speak with me today. Is there anything else I can do for you? no      Thank you, and have a great day.

## 2021-11-24 NOTE — PROGRESS NOTES
Spoke with Daughter.     -- Patient needs to start on vitamin D 61150 IU weekly and vitamin B12 1000 mg daily. I have emailed scripts to pharmacy.  She can reduce her metformin to once daily

## 2022-01-01 ENCOUNTER — TELEPHONE (OUTPATIENT)
Dept: FAMILY MEDICINE CLINIC | Facility: CLINIC | Age: 77
End: 2022-01-01

## 2022-01-07 ENCOUNTER — APPOINTMENT (OUTPATIENT)
Dept: BONE DENSITY | Facility: HOSPITAL | Age: 77
End: 2022-01-07

## 2022-01-07 ENCOUNTER — APPOINTMENT (OUTPATIENT)
Dept: MAMMOGRAPHY | Facility: HOSPITAL | Age: 77
End: 2022-01-07

## 2022-02-11 ENCOUNTER — TELEPHONE (OUTPATIENT)
Dept: FAMILY MEDICINE CLINIC | Facility: CLINIC | Age: 77
End: 2022-02-11

## 2022-02-15 ENCOUNTER — OFFICE VISIT (OUTPATIENT)
Dept: FAMILY MEDICINE CLINIC | Facility: CLINIC | Age: 77
End: 2022-02-15

## 2022-02-15 DIAGNOSIS — F17.200 SMOKER: ICD-10-CM

## 2022-02-15 DIAGNOSIS — Z00.00 HEALTHCARE MAINTENANCE: ICD-10-CM

## 2022-02-15 DIAGNOSIS — E78.2 MIXED HYPERLIPIDEMIA: ICD-10-CM

## 2022-02-15 DIAGNOSIS — F41.8 DEPRESSION WITH ANXIETY: ICD-10-CM

## 2022-02-15 DIAGNOSIS — Z87.19 HISTORY OF MELENA: ICD-10-CM

## 2022-02-15 DIAGNOSIS — I70.90 ATHEROSCLEROSIS: Primary | ICD-10-CM

## 2022-02-15 DIAGNOSIS — I10 ESSENTIAL HYPERTENSION: ICD-10-CM
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
## 2022-02-15 DIAGNOSIS — J44.9 COPD MIXED TYPE: ICD-10-CM

## 2022-02-15 DIAGNOSIS — E11.51 TYPE 2 DIABETES MELLITUS WITH DIABETIC PERIPHERAL ANGIOPATHY WITHOUT GANGRENE, WITHOUT LONG-TERM CURRENT USE OF INSULIN: ICD-10-CM

## 2022-02-15 DIAGNOSIS — I87.2 CHRONIC VENOUS INSUFFICIENCY: ICD-10-CM

## 2022-02-15 PROCEDURE — 99214 OFFICE O/P EST MOD 30 MIN: CPT | Performed by: GENERAL PRACTICE

## 2022-02-15 RX ORDER — METOPROLOL SUCCINATE 50 MG/1
50 TABLET, EXTENDED RELEASE ORAL NIGHTLY
Qty: 30 TABLET | Refills: 5 | Status: SHIPPED | OUTPATIENT
Start: 2022-02-15 | End: 2022-08-22

## 2022-02-15 RX ORDER — HYDRALAZINE HYDROCHLORIDE 25 MG/1
25 TABLET, FILM COATED ORAL 2 TIMES DAILY
Qty: 60 TABLET | Refills: 5 | Status: SHIPPED | OUTPATIENT
Start: 2022-02-15 | End: 2022-09-19

## 2022-02-15 NOTE — PROGRESS NOTES
Subjective   Mariluz Jenkins is a 76 y.o. female.     Chief Complaint  She returns for a scheduled reassessment of multiple medical problems including type 2 diabetes mellitus, hyperlipidemia, essential hypertension, depression, and recent falls    History of Present Illness     Falls  Since last here she has had several falls.  These have all occurred shortly after getting up from sitting and were associated with lightheadedness and generalized weakness.  She denies any other warning symptoms and there is no history of any chest pain, palpitations, diaphoresis, or nausea.  She denies any new headaches or visual disturbances and has had no unilateral weakness.    Type 2 Diabetes Mellitus  She gives an approximate 35-year history of type 2 diabetes mellitus.  She is currently maintained on metformin 1000 twice daily as well as ASA 81 daily.  She admits to bilateral visual blurring and numbness of both feet.  She denies any polydipsia or polyuria and there is no history of any skin breakdown.  She has lost approximately 50 pounds over the last 5 years with a steady improvement in her glucose.  Duplex Doppler ultrasound of the carotid arteries performed on 10/3/2019 revealed mild to moderate plaque formation worse on the right.  No hemodynamically significant stenosis was noted.  CTA of the abdomen performed on 10/11/2019 was reported as showing atherosclerotic vascular calcifications.  No hemodynamically significant stenosis was noted.  Nuclear stress testing performed on 4/12/2021 revealed no evidence of inducible ischemia with a calculated EF of 65%  Lab Results   Component Value Date    HGBA1C 5.68 (H) 11/11/2021      Hyperlipidemia  She is currently maintained on atorvastatin 20 daily.  She denies any apparent side effects  Lab Results   Component Value Date    CHOL 116 11/11/2021    TRIG 96 11/11/2021    HDL 48 11/11/2021    LDL 50 11/11/2021      Essential Hypertension  She is currently maintained on losartan  100 daily, metoprolol 25 twice daily, hydralazine 10 3 times daily, and clonidine 0.1 twice daily.  She admits to shortness of breath with exertion with intermittent lower extremity edema but denies any orthopnea or PND and there is no history of any chest pain, palpitations, or lightheadedness.  She admits to bilateral visual blurring and numbness of both feet but denies any other visual disturbances and there is no history of any weakness or difficulty talking.  She has long standing hearing loss but has no difficulty understanding what is said to her provided she can hear it  Lab Results   Component Value Date    GLUCOSE 98 11/11/2021    BUN 8 11/11/2021    CREATININE 0.71 11/11/2021    EGFRIFNONA 80 11/11/2021    BCR 11.3 11/11/2021    K 4.1 11/11/2021    CO2 25.3 11/11/2021    CALCIUM 9.5 11/11/2021    ALBUMIN 4.10 11/11/2021    AST 13 11/11/2021    ALT 7 11/11/2021     Lab Results   Component Value Date    ALKPHOS 53 11/11/2021      Depression With Anxiety  She gives a more than 5 year history of depressed mood, anhedonia, nervousness, decreased appetite with weight loss, and insomnia.  She dates the symptoms to the death of her  from colon cancer.  There is no history of any suicidal ideation.  She is currently maintained on paroxetine 30 daily, buspirone 5 twice daily, and trazodone 100 every evening  Lab Results   Component Value Date    TSH 1.530 11/11/2021     Labs  Most recent vitamin D 29 with a B12 of 232    The following portions of the patient's history were reviewed and updated as appropriate: allergies, current medications, past medical history, past social history and problem list.    Review of Systems   Constitutional: Positive for fatigue. Negative for appetite change, chills, fever and unexpected weight change.   HENT: Positive for hearing loss. Negative for congestion, ear pain, rhinorrhea, sneezing, sore throat and voice change.    Eyes: Negative for visual disturbance.   Respiratory:  Positive for shortness of breath. Negative for cough and wheezing.    Cardiovascular: Positive for leg swelling. Negative for chest pain and palpitations.   Gastrointestinal: Positive for constipation and nausea. Negative for abdominal pain, blood in stool, diarrhea and vomiting.        Occasional black stool   Endocrine: Negative for polydipsia.   Genitourinary: Negative for difficulty urinating, dysuria, frequency, hematuria, menstrual problem, pelvic pain, urgency, vaginal bleeding and vaginal discharge.   Musculoskeletal: Negative for arthralgias, back pain, joint swelling, myalgias and neck pain.   Skin: Negative for color change.   Neurological: Positive for weakness (generalized) and numbness (feet). Negative for tremors and headaches.   Psychiatric/Behavioral: Positive for decreased concentration, dysphoric mood and sleep disturbance. Negative for suicidal ideas. The patient is nervous/anxious.      Objective   Physical Exam  Constitutional:       General: She is not in acute distress.     Appearance: Normal appearance. She is well-developed. She is not diaphoretic.      Comments: Accompanied by her daughter. Bright and in fair spirits. No apparent distress.  Ambulating with a walker.  Hearing loss.  No pallor, jaundice, diaphoresis, or cyanosis.     HENT:      Head: Atraumatic.      Right Ear: Decreased hearing noted.   Eyes:      Conjunctiva/sclera: Conjunctivae normal.   Neck:      Thyroid: No thyroid mass or thyromegaly.      Vascular: No carotid bruit or JVD.      Trachea: Trachea normal. No tracheal deviation.   Cardiovascular:      Rate and Rhythm: Normal rate and regular rhythm.      Heart sounds: Normal heart sounds, S1 normal and S2 normal. No murmur heard.  No gallop.    Pulmonary:      Effort: Pulmonary effort is normal.      Breath sounds: Examination of the right-lower field reveals decreased breath sounds. Examination of the left-lower field reveals decreased breath sounds. Decreased breath  sounds and wheezing (diffuse - moderate) present. No rales.   Chest:   Breasts:      Right: No supraclavicular adenopathy.      Left: No supraclavicular adenopathy.       Abdominal:      General: Bowel sounds are normal. There is no distension or abdominal bruit.      Palpations: Abdomen is soft. There is no hepatomegaly, splenomegaly or mass.      Tenderness: There is no abdominal tenderness.      Hernia: No hernia is present.   Musculoskeletal:      Right lower le+ Edema present.      Left lower le+ Edema present.   Lymphadenopathy:      Head:      Right side of head: No submental, submandibular, tonsillar, preauricular, posterior auricular or occipital adenopathy.      Left side of head: No submental, submandibular, tonsillar, preauricular, posterior auricular or occipital adenopathy.      Cervical: No cervical adenopathy.      Upper Body:      Right upper body: No supraclavicular adenopathy.      Left upper body: No supraclavicular adenopathy.   Skin:     General: Skin is warm.      Coloration: Skin is not cyanotic, jaundiced or pale.      Findings: No rash.      Nails: There is no clubbing.   Neurological:      Mental Status: She is alert and oriented to person, place, and time.      Cranial Nerves: No cranial nerve deficit.      Sensory: Sensory deficit (decreased vibration sense toes of both feet) present.      Motor: No tremor.      Coordination: Coordination normal.      Gait: Gait normal.   Psychiatric:         Speech: Speech normal.         Behavior: Behavior normal.         Thought Content: Thought content normal.       Assessment/Plan   Problems Addressed this Visit        Cardiac and Vasculature    Atherosclerosis   Reminded regarding risk factor modification with an emphasis on tobacco cessation.  Continue low-dose ASA    Chronic venous insufficiency  Reminded regarding lifestyle modification  Prescription written for graded compression hose    Essential hypertension   Hypertension: marginal.  Evidence of target organ damage: evidence of atherosclerosis on previous imaging.  Encouraged to continue to work on diet and exercise plan.   Clonidine will be discontinued  Hydralazine will be titrated to 25 twice daily  Metoprolol will be replaced with the succinate formulation    Relevant Medications    hydrALAZINE (APRESOLINE) 25 MG tablet    metoprolol succinate XL (TOPROL-XL) 50 MG 24 hr tablet    Mixed hyperlipidemia  As above.   Continue current medication.       Endocrine and Metabolic    Type 2 diabetes mellitus with diabetic peripheral angiopathy without gangrene, without long-term current use of insulin (HCC)  Diabetes mellitus Type II, under excellent control.   Encouraged to continue to pursue ADA diet  Encouraged aerobic exercise.  We will consider adding empagliflozin at her return given her cardiovascular risk       Gastrointestinal Abdominal     History of melena  Possible  Reviewed options and agreed on referral for an EGD and screening colonoscopy.    Relevant Orders    Ambulatory Referral to General Surgery       Health Encounters    Healthcare maintenance  As above.  Recommended a third dose of the Moderna COVID-19 vaccine  Reminded that she is due for Shingrix and an updated Tdap  Encouraged to follow-up with mammogram, DEXA scan, and low-dose CT of the chest    Relevant Orders    Ambulatory Referral to General Surgery       Mental Health    Depression with anxiety  Significant situational component.   Supportive therapy.   Continue current medication.       Musculoskeletal and Injuries    Falls  Likely associated with postural hypotension  Lengthy discussion regarding fall avoidance       Pulmonary and Pneumonias    COPD mixed type (HCC)   COPD is stable.  Reminded of the importance of smoking cessation  Encouraged to remain as active as symptoms allow for       Tobacco    Smoker      Diagnoses       Codes Comments    Atherosclerosis    -  Primary ICD-10-CM: I70.90  ICD-9-CM: 440.9      Essential hypertension     ICD-10-CM: I10  ICD-9-CM: 401.9     Type 2 diabetes mellitus with diabetic peripheral angiopathy without gangrene, without long-term current use of insulin (HCC)     ICD-10-CM: E11.51  ICD-9-CM: 250.70, 443.81     Healthcare maintenance     ICD-10-CM: Z00.00  ICD-9-CM: V70.0     Depression with anxiety     ICD-10-CM: F41.8  ICD-9-CM: 300.4     COPD mixed type (HCC)     ICD-10-CM: J44.9  ICD-9-CM: 496     Smoker     ICD-10-CM: F17.200  ICD-9-CM: 305.1     History of melena     ICD-10-CM: Z87.19  ICD-9-CM: V12.79     Mixed hyperlipidemia     ICD-10-CM: E78.2  ICD-9-CM: 272.2     Chronic venous insufficiency     ICD-10-CM: I87.2  ICD-9-CM: 459.81     Fall, initial encounter     ICD-10-CM: W19.XXXA  ICD-9-CM: E888.9

## 2022-02-16 VITALS
BODY MASS INDEX: 17.58 KG/M2 | HEIGHT: 67 IN | HEART RATE: 64 BPM | OXYGEN SATURATION: 94 % | TEMPERATURE: 97.7 F | WEIGHT: 112 LBS | RESPIRATION RATE: 15 BRPM | DIASTOLIC BLOOD PRESSURE: 76 MMHG | SYSTOLIC BLOOD PRESSURE: 158 MMHG
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
## 2022-02-17 NOTE — TELEPHONE ENCOUNTER
Called and let the patient's daughter know that the referral has been placed and that leah could contact them this week.

## 2022-02-17 NOTE — TELEPHONE ENCOUNTER
Caller: BARRIE QUESADA    Relationship: Emergency Contact    Best call back number: [phone]    What orders are you requesting (i.e. lab or imaging): UPPER AND LOWER SCOPE    In what timeframe would the patient need to come in: AS SOON AS POSSIBLE    Where will you receive your lab/imaging services: [facility]    Additional notes: PATIENT WAS SUPPOSED TO BE SCHEDULED FOR THIS PROCEDURE. PLEASE ADVISE PATIENT OF CURRENT STATUS OF THIS ORDER.

## 2022-02-18 ENCOUNTER — APPOINTMENT (OUTPATIENT)
Dept: MAMMOGRAPHY | Facility: HOSPITAL | Age: 77
End: 2022-02-18

## 2022-02-18 ENCOUNTER — APPOINTMENT (OUTPATIENT)
Dept: BONE DENSITY | Facility: HOSPITAL | Age: 77
End: 2022-02-18

## 2022-02-28 NOTE — TELEPHONE ENCOUNTER
Caller: BARRIE QUESADA    Relationship: Emergency Contact    Best call back number: [phone]    What is the best time to reach you: ANYTIME     Who are you requesting to speak with (clinical staff, provider,  specific staff member): CLINICAL STAFF        What was the call regarding: THE PATIENTS DAUGHTER STATES THAT HER MOTHER WAS SEEN ON 02/15/2022 SHE STATES THAT THE PATIENT WAS SUPPOSED TO BE REFERRED TO HAVE AN UPPER AND LOWER SCOPE BECAUSE THE PATIENT STATED THAT HER STOOL WAS BLACK THE CALLER STATES THAT THEY HAVE NOT RECEIVED A CALL TO SCHEDULE TO SCOPES SHE WOULD LIKE AN UPDATE ON THE SCOPES     Do you require a callback: YES

## 2022-03-16 ENCOUNTER — OFFICE VISIT (OUTPATIENT)
Dept: SURGERY | Facility: CLINIC | Age: 77
End: 2022-03-16

## 2022-03-16 VITALS
BODY MASS INDEX: 18.32 KG/M2 | WEIGHT: 114 LBS | DIASTOLIC BLOOD PRESSURE: 78 MMHG | SYSTOLIC BLOOD PRESSURE: 165 MMHG | HEIGHT: 66 IN | HEART RATE: 58 BPM

## 2022-03-16 DIAGNOSIS — K92.1 MELENA: Primary | ICD-10-CM

## 2022-03-16 PROCEDURE — 99203 OFFICE O/P NEW LOW 30 MIN: CPT | Performed by: SURGERY

## 2022-03-16 RX ORDER — POLYETHYLENE GLYCOL 3350, SODIUM SULFATE ANHYDROUS, SODIUM BICARBONATE, SODIUM CHLORIDE, POTASSIUM CHLORIDE 236; 22.74; 6.74; 5.86; 2.97 G/4L; G/4L; G/4L; G/4L; G/4L
4 POWDER, FOR SOLUTION ORAL ONCE
Qty: 4000 ML | Refills: 0 | Status: SHIPPED | OUTPATIENT
Start: 2022-03-16 | End: 2022-03-16

## 2022-03-16 NOTE — PROGRESS NOTES
Subjective   Mariluz Jenkins is a 76 y.o. female is being seen for consultation today at the request of John Middleton MD    Mariluz Jenkins is a 76 y.o. female Who reports melanic stools.  No need for blood transfusion.  Dark tarry stools present for several weeks.  No previous colonoscopy as she states poor prep has always been a deterrent as multiple attempts at inpatient colonoscopy had been performed at [facility].  No hematemesis.  She does smoke.  She is on PPI therapy.        Past Medical History:   Diagnosis Date   • Diabetes (HCC)    • Hypertension        Family History   Problem Relation Age of Onset   • Heart disease Sister        Social History     Socioeconomic History   • Marital status: Unknown   Tobacco Use   • Smoking status: Current Every Day Smoker   • Smokeless tobacco: Never Used   Substance and Sexual Activity   • Alcohol use: No   • Drug use: No   • Sexual activity: Defer       History reviewed. No pertinent surgical history.    Review of Systems   Constitutional: Negative for activity change, appetite change, chills and fever.   HENT: Negative for sore throat and trouble swallowing.    Eyes: Negative for visual disturbance.   Respiratory: Negative for cough and shortness of breath.    Cardiovascular: Negative for chest pain and palpitations.   Gastrointestinal: Positive for blood in stool. Negative for abdominal distention, abdominal pain, constipation, diarrhea, nausea and vomiting.   Endocrine: Negative for cold intolerance and heat intolerance.   Genitourinary: Negative for dysuria.   Musculoskeletal: Negative for joint swelling.   Skin: Negative for color change, rash and wound.   Allergic/Immunologic: Negative for immunocompromised state.   Neurological: Negative for dizziness, seizures, weakness and headaches.   Hematological: Negative for adenopathy. Does not bruise/bleed easily.   Psychiatric/Behavioral: Negative for agitation and confusion.         /78   " Pulse 58   Ht 168.9 cm (66.5\")   Wt 51.7 kg (114 lb)   BMI 18.13 kg/m²   Objective   Physical Exam  Constitutional:       Appearance: She is well-developed.   HENT:      Head: Normocephalic and atraumatic.   Eyes:      Conjunctiva/sclera: Conjunctivae normal.      Pupils: Pupils are equal, round, and reactive to light.   Neck:      Thyroid: No thyromegaly.      Vascular: No JVD.      Trachea: No tracheal deviation.   Cardiovascular:      Rate and Rhythm: Normal rate and regular rhythm.      Heart sounds: No murmur heard.    No friction rub. No gallop.   Pulmonary:      Effort: Pulmonary effort is normal.      Breath sounds: Normal breath sounds.   Abdominal:      General: There is no distension.      Palpations: Abdomen is soft. There is no hepatomegaly or splenomegaly.      Tenderness: There is no abdominal tenderness.      Hernia: No hernia is present.   Musculoskeletal:         General: No deformity. Normal range of motion.      Cervical back: Neck supple.   Skin:     General: Skin is warm and dry.   Neurological:      Mental Status: She is alert and oriented to person, place, and time.               Assessment   Diagnoses and all orders for this visit:    1. Melena (Primary)  -     Case Request; Standing  -     COVID PRE-OP / PRE-PROCEDURE SCREENING ORDER (NO ISOLATION) - Swab, Nasopharynx; Future  -     Case Request    Other orders  -     Follow anesthesia standing orders.  -     Provide NPO Instructions to Patient; Future  -     Chlorhexidine Skin Prep; Future  -     polyethylene glycol (Golytely) 236 g solution; Take 4,000 mL by mouth 1 (One) Time for 1 dose.  Dispense: 4000 mL; Refill: 0      Mariluz Jenkins is a 76 y.o. female with melanic stools need for colonoscopy.  She undergo EGD and colonoscopy to evaluate for GI source of blood loss.    Patient's Body mass index is 18.13 kg/m². indicating that she is within normal range (BMI 18.5-24.9). No BMI management plan needed..             "

## 2022-03-29 ENCOUNTER — APPOINTMENT (OUTPATIENT)
Dept: BONE DENSITY | Facility: HOSPITAL | Age: 77
End: 2022-03-29

## 2022-03-29 ENCOUNTER — APPOINTMENT (OUTPATIENT)
Dept: MAMMOGRAPHY | Facility: HOSPITAL | Age: 77
End: 2022-03-29

## 2022-04-15 ENCOUNTER — TELEPHONE (OUTPATIENT)
Dept: SURGERY | Facility: CLINIC | Age: 77
End: 2022-04-15

## 2022-04-15 NOTE — TELEPHONE ENCOUNTER
Talked with patients daughter, gave surgery time, bowel prep and covid testing. Patients daughter voiced understanding.

## 2022-04-18 ENCOUNTER — LAB (OUTPATIENT)
Dept: LAB | Facility: HOSPITAL | Age: 77
End: 2022-04-18

## 2022-04-18 DIAGNOSIS — K92.1 MELENA: ICD-10-CM

## 2022-04-18 PROCEDURE — U0004 COV-19 TEST NON-CDC HGH THRU: HCPCS | Performed by: SURGERY

## 2022-04-18 PROCEDURE — U0005 INFEC AGEN DETEC AMPLI PROBE: HCPCS | Performed by: SURGERY

## 2022-04-19 LAB — SARS-COV-2 RNA PNL SPEC NAA+PROBE: NOT DETECTED

## 2022-04-20 ENCOUNTER — ANESTHESIA EVENT (OUTPATIENT)
Dept: PERIOP | Facility: HOSPITAL | Age: 77
End: 2022-04-20

## 2022-04-20 ENCOUNTER — HOSPITAL ENCOUNTER (OUTPATIENT)
Facility: HOSPITAL | Age: 77
Setting detail: HOSPITAL OUTPATIENT SURGERY
Discharge: HOME | End: 2022-04-20
Attending: SURGERY | Admitting: SURGERY

## 2022-04-20 ENCOUNTER — ANESTHESIA (OUTPATIENT)
Dept: PERIOP | Facility: HOSPITAL | Age: 77
End: 2022-04-20

## 2022-04-20 VITALS
RESPIRATION RATE: 18 BRPM | SYSTOLIC BLOOD PRESSURE: 187 MMHG | DIASTOLIC BLOOD PRESSURE: 89 MMHG | WEIGHT: 112 LBS | HEART RATE: 72 BPM | TEMPERATURE: 97.6 F | BODY MASS INDEX: 18 KG/M2 | OXYGEN SATURATION: 97 % | HEIGHT: 66 IN

## 2022-04-20 DIAGNOSIS — K92.1 MELENA: ICD-10-CM

## 2022-04-20 LAB — GLUCOSE BLDC GLUCOMTR-MCNC: 67 MG/DL (ref 70–130)

## 2022-04-20 PROCEDURE — 88305 TISSUE EXAM BY PATHOLOGIST: CPT | Performed by: SURGERY

## 2022-04-20 PROCEDURE — 25010000002 PROPOFOL 10 MG/ML EMULSION: Performed by: NURSE ANESTHETIST, CERTIFIED REGISTERED

## 2022-04-20 PROCEDURE — 43239 EGD BIOPSY SINGLE/MULTIPLE: CPT | Performed by: SURGERY

## 2022-04-20 PROCEDURE — 88312 SPECIAL STAINS GROUP 1: CPT | Performed by: SURGERY

## 2022-04-20 PROCEDURE — 82962 GLUCOSE BLOOD TEST: CPT

## 2022-04-20 PROCEDURE — 25010000002 HYDRALAZINE PER 20 MG: Performed by: ANESTHESIOLOGY

## 2022-04-20 PROCEDURE — 45385 COLONOSCOPY W/LESION REMOVAL: CPT | Performed by: SURGERY

## 2022-04-20 PROCEDURE — C1889 IMPLANT/INSERT DEVICE, NOC: HCPCS | Performed by: SURGERY

## 2022-04-20 PROCEDURE — S0260 H&P FOR SURGERY: HCPCS | Performed by: SURGERY

## 2022-04-20 DEVICE — CLIPPING DEVICE
Type: IMPLANTABLE DEVICE | Site: COLON | Status: FUNCTIONAL
Brand: RESOLUTION CLIP

## 2022-04-20 RX ORDER — PROPOFOL 10 MG/ML
VIAL (ML) INTRAVENOUS AS NEEDED
Status: DISCONTINUED | OUTPATIENT
Start: 2022-04-20 | End: 2022-04-20 | Stop reason: SURG

## 2022-04-20 RX ORDER — KETOROLAC TROMETHAMINE 30 MG/ML
15 INJECTION, SOLUTION INTRAMUSCULAR; INTRAVENOUS EVERY 6 HOURS PRN
Status: DISCONTINUED | OUTPATIENT
Start: 2022-04-20 | End: 2022-04-20 | Stop reason: HOSPADM

## 2022-04-20 RX ORDER — FENTANYL CITRATE 50 UG/ML
50 INJECTION, SOLUTION INTRAMUSCULAR; INTRAVENOUS
Status: DISCONTINUED | OUTPATIENT
Start: 2022-04-20 | End: 2022-04-20 | Stop reason: HOSPADM

## 2022-04-20 RX ORDER — DROPERIDOL 2.5 MG/ML
0.62 INJECTION, SOLUTION INTRAMUSCULAR; INTRAVENOUS ONCE AS NEEDED
Status: DISCONTINUED | OUTPATIENT
Start: 2022-04-20 | End: 2022-04-20 | Stop reason: HOSPADM

## 2022-04-20 RX ORDER — MIDAZOLAM HYDROCHLORIDE 1 MG/ML
0.5 INJECTION INTRAMUSCULAR; INTRAVENOUS
Status: DISCONTINUED | OUTPATIENT
Start: 2022-04-20 | End: 2022-04-20 | Stop reason: HOSPADM

## 2022-04-20 RX ORDER — OXYCODONE HYDROCHLORIDE AND ACETAMINOPHEN 5; 325 MG/1; MG/1
1 TABLET ORAL ONCE AS NEEDED
Status: DISCONTINUED | OUTPATIENT
Start: 2022-04-20 | End: 2022-04-20 | Stop reason: HOSPADM

## 2022-04-20 RX ORDER — SODIUM CHLORIDE, SODIUM LACTATE, POTASSIUM CHLORIDE, CALCIUM CHLORIDE 600; 310; 30; 20 MG/100ML; MG/100ML; MG/100ML; MG/100ML
100 INJECTION, SOLUTION INTRAVENOUS ONCE AS NEEDED
Status: DISCONTINUED | OUTPATIENT
Start: 2022-04-20 | End: 2022-04-20 | Stop reason: HOSPADM

## 2022-04-20 RX ORDER — LIDOCAINE HYDROCHLORIDE 20 MG/ML
INJECTION, SOLUTION INFILTRATION; PERINEURAL AS NEEDED
Status: DISCONTINUED | OUTPATIENT
Start: 2022-04-20 | End: 2022-04-20 | Stop reason: SURG

## 2022-04-20 RX ORDER — SODIUM CHLORIDE, SODIUM LACTATE, POTASSIUM CHLORIDE, CALCIUM CHLORIDE 600; 310; 30; 20 MG/100ML; MG/100ML; MG/100ML; MG/100ML
125 INJECTION, SOLUTION INTRAVENOUS ONCE
Status: COMPLETED | OUTPATIENT
Start: 2022-04-20 | End: 2022-04-20

## 2022-04-20 RX ORDER — SODIUM CHLORIDE 0.9 % (FLUSH) 0.9 %
10 SYRINGE (ML) INJECTION EVERY 12 HOURS SCHEDULED
Status: DISCONTINUED | OUTPATIENT
Start: 2022-04-20 | End: 2022-04-20 | Stop reason: HOSPADM

## 2022-04-20 RX ORDER — MEPERIDINE HYDROCHLORIDE 25 MG/ML
12.5 INJECTION INTRAMUSCULAR; INTRAVENOUS; SUBCUTANEOUS
Status: DISCONTINUED | OUTPATIENT
Start: 2022-04-20 | End: 2022-04-20 | Stop reason: HOSPADM

## 2022-04-20 RX ORDER — IPRATROPIUM BROMIDE AND ALBUTEROL SULFATE 2.5; .5 MG/3ML; MG/3ML
3 SOLUTION RESPIRATORY (INHALATION) ONCE AS NEEDED
Status: DISCONTINUED | OUTPATIENT
Start: 2022-04-20 | End: 2022-04-20 | Stop reason: HOSPADM

## 2022-04-20 RX ORDER — SODIUM CHLORIDE 0.9 % (FLUSH) 0.9 %
10 SYRINGE (ML) INJECTION AS NEEDED
Status: DISCONTINUED | OUTPATIENT
Start: 2022-04-20 | End: 2022-04-20 | Stop reason: HOSPADM

## 2022-04-20 RX ORDER — ONDANSETRON 2 MG/ML
4 INJECTION INTRAMUSCULAR; INTRAVENOUS AS NEEDED
Status: DISCONTINUED | OUTPATIENT
Start: 2022-04-20 | End: 2022-04-20 | Stop reason: HOSPADM

## 2022-04-20 RX ORDER — HYDRALAZINE HYDROCHLORIDE 20 MG/ML
10 INJECTION INTRAMUSCULAR; INTRAVENOUS ONCE
Status: COMPLETED | OUTPATIENT
Start: 2022-04-20 | End: 2022-04-20

## 2022-04-20 RX ADMIN — PROPOFOL 40 MG: 10 INJECTION, EMULSION INTRAVENOUS at 09:56

## 2022-04-20 RX ADMIN — PROPOFOL 150 MCG/KG/MIN: 10 INJECTION, EMULSION INTRAVENOUS at 09:56

## 2022-04-20 RX ADMIN — HYDRALAZINE HYDROCHLORIDE 10 MG: 20 INJECTION INTRAMUSCULAR; INTRAVENOUS at 08:55

## 2022-04-20 RX ADMIN — SODIUM CHLORIDE, POTASSIUM CHLORIDE, SODIUM LACTATE AND CALCIUM CHLORIDE: 600; 310; 30; 20 INJECTION, SOLUTION INTRAVENOUS at 09:56

## 2022-04-20 RX ADMIN — LIDOCAINE HYDROCHLORIDE 60 MG: 20 INJECTION, SOLUTION INFILTRATION; PERINEURAL at 09:56

## 2022-04-20 NOTE — ANESTHESIA PREPROCEDURE EVALUATION
Anesthesia Evaluation     Patient summary reviewed and Nursing notes reviewed   no history of anesthetic complications:  NPO Solid Status: > 8 hours  NPO Liquid Status: > 8 hours           Airway   Mallampati: II  TM distance: >3 FB  Neck ROM: full  No difficulty expected  Dental    (+) upper dentures    Pulmonary - normal exam    breath sounds clear to auscultation  (+) a smoker Current Smoked day of surgery, COPD,   Cardiovascular - normal exam    Patient on routine beta blocker  Rhythm: regular  Rate: normal    (+) hypertension poorly controlled 2 medications or greater, valvular problems/murmurs MVP, PVD (Diabetic peripheral angiopathy), hyperlipidemia,   (-) past MI    ROS comment: Cardiac stress test 4/13/2021  ·Findings consistent with an indeterminate ECG stress test.  ·Myocardial perfusion imaging indicates a normal myocardial perfusion study with no evidence of ischemia.  ·Normal LV cavity size. Normal LV wall motion noted.  ·Left ventricular ejection fraction is normal. (Calculated EF = 65%).  ·Impressions are consistent with a low risk study.        Neuro/Psych  (+) psychiatric history Anxiety and Depression,    (-) seizures, CVA  GI/Hepatic/Renal/Endo    (+)   diabetes mellitus type 2,     Musculoskeletal     Abdominal  - normal exam   Substance History - negative use  (-) alcohol use     OB/GYN negative ob/gyn ROS         Other   arthritis,                    Anesthesia Plan    ASA 2     general     intravenous induction     Anesthetic plan, all risks, benefits, and alternatives have been provided, discussed and informed consent has been obtained with: patient.        CODE STATUS:

## 2022-04-20 NOTE — H&P
Subjective      Mariluz Jenkins is a 76 y.o. female is being seen for consultation today at the request of John Middleton MD     Mariluz Jenkins is a 76 y.o. female Who reports melanic stools.  No need for blood transfusion.  Dark tarry stools present for several weeks.  No previous colonoscopy as she states poor prep has always been a deterrent as multiple attempts at inpatient colonoscopy had been performed at [facility].  No hematemesis.  She does smoke.  She is on PPI therapy.           Medical History        Past Medical History:   Diagnosis Date   • Diabetes (HCC)     • Hypertension                    Family History   Problem Relation Age of Onset   • Heart disease Sister           Social History   Social History           Socioeconomic History   • Marital status: Unknown   Tobacco Use   • Smoking status: Current Every Day Smoker   • Smokeless tobacco: Never Used   Substance and Sexual Activity   • Alcohol use: No   • Drug use: No   • Sexual activity: Defer            Surgical History   History reviewed. No pertinent surgical history.        Review of Systems   Constitutional: Negative for activity change, appetite change, chills and fever.   HENT: Negative for sore throat and trouble swallowing.    Eyes: Negative for visual disturbance.   Respiratory: Negative for cough and shortness of breath.    Cardiovascular: Negative for chest pain and palpitations.   Gastrointestinal: Positive for blood in stool. Negative for abdominal distention, abdominal pain, constipation, diarrhea, nausea and vomiting.   Endocrine: Negative for cold intolerance and heat intolerance.   Genitourinary: Negative for dysuria.   Musculoskeletal: Negative for joint swelling.   Skin: Negative for color change, rash and wound.   Allergic/Immunologic: Negative for immunocompromised state.   Neurological: Negative for dizziness, seizures, weakness and headaches.   Hematological: Negative for adenopathy. Does not  "bruise/bleed easily.   Psychiatric/Behavioral: Negative for agitation and confusion.            /78   Pulse 58   Ht 168.9 cm (66.5\")   Wt 51.7 kg (114 lb)   BMI 18.13 kg/m²         Objective      Physical Exam  Constitutional:       Appearance: She is well-developed.   HENT:      Head: Normocephalic and atraumatic.   Eyes:      Conjunctiva/sclera: Conjunctivae normal.      Pupils: Pupils are equal, round, and reactive to light.   Neck:      Thyroid: No thyromegaly.      Vascular: No JVD.      Trachea: No tracheal deviation.   Cardiovascular:      Rate and Rhythm: Normal rate and regular rhythm.      Heart sounds: No murmur heard.    No friction rub. No gallop.   Pulmonary:      Effort: Pulmonary effort is normal.      Breath sounds: Normal breath sounds.   Abdominal:      General: There is no distension.      Palpations: Abdomen is soft. There is no hepatomegaly or splenomegaly.      Tenderness: There is no abdominal tenderness.      Hernia: No hernia is present.   Musculoskeletal:         General: No deformity. Normal range of motion.      Cervical back: Neck supple.   Skin:     General: Skin is warm and dry.   Neurological:      Mental Status: She is alert and oriented to person, place, and time.                         Assessment      Diagnoses and all orders for this visit:     1. Melena (Primary)  -     Case Request; Standing  -     COVID PRE-OP / PRE-PROCEDURE SCREENING ORDER (NO ISOLATION) - Swab, Nasopharynx; Future  -     Case Request     Other orders  -     Follow anesthesia standing orders.  -     Provide NPO Instructions to Patient; Future  -     Chlorhexidine Skin Prep; Future  -     polyethylene glycol (Golytely) 236 g solution; Take 4,000 mL by mouth 1 (One) Time for 1 dose.  Dispense: 4000 mL; Refill: 0        Mariluz Jenkins is a 76 y.o. female with melanic stools need for colonoscopy.  She undergo EGD and colonoscopy to evaluate for GI source of blood loss.     Patient's Body mass index " is 18.13 kg/m². indicating that she is within normal range (BMI 18.5-24.9). No BMI management plan needed.

## 2022-04-20 NOTE — ANESTHESIA POSTPROCEDURE EVALUATION
Patient: Mariluz Jenkins    Procedure Summary     Date: 04/20/22 Room / Location: [facility] OR  /  [facility] OR    Anesthesia Start: 0915 Anesthesia Stop: 1038    Procedures:       COLONOSCOPY (N/A )      ESOPHAGOGASTRODUODENOSCOPY (N/A Esophagus) Diagnosis:       Melena      (Melena [K92.1])    Surgeons: Sandro Noel MD Provider: Montrell Langford MD    Anesthesia Type: general ASA Status: 2          Anesthesia Type: general    Vitals  Vitals Value Taken Time   /89 04/20/22 1054   Temp 97.6 °F (36.4 °C) 04/20/22 1039   Pulse 70 04/20/22 1054   Resp 18 04/20/22 1054   SpO2 97 % 04/20/22 1054           Post Anesthesia Care and Evaluation    Patient location during evaluation: PACU  Patient participation: complete - patient participated  Level of consciousness: awake  Pain score: 1  Pain management: adequate  Airway patency: patent  Anesthetic complications: No anesthetic complications  PONV Status: controlled  Cardiovascular status: acceptable and blood pressure returned to baseline  Respiratory status: acceptable and room air  Hydration status: acceptable    Comments: Patient and daughter comfortable with discharge at this time.  Restarted patient's home PO antihypertensive medications prior to discharge.

## 2022-04-25 LAB
LAB AP CASE REPORT: NORMAL
PATH REPORT.ADDENDUM SPEC: NORMAL
PATH REPORT.FINAL DX SPEC: NORMAL

## 2022-05-16 ENCOUNTER — OFFICE VISIT (OUTPATIENT)
Dept: FAMILY MEDICINE CLINIC | Facility: CLINIC | Age: 77
End: 2022-05-16

## 2022-05-16 VITALS
OXYGEN SATURATION: 97 % | BODY MASS INDEX: 17.52 KG/M2 | DIASTOLIC BLOOD PRESSURE: 64 MMHG | HEIGHT: 66 IN | SYSTOLIC BLOOD PRESSURE: 168 MMHG | RESPIRATION RATE: 15 BRPM | HEART RATE: 69 BPM | TEMPERATURE: 98.6 F | WEIGHT: 109 LBS

## 2022-05-16 DIAGNOSIS — E55.9 VITAMIN D DEFICIENCY: ICD-10-CM

## 2022-05-16 DIAGNOSIS — E11.51 TYPE 2 DIABETES MELLITUS WITH DIABETIC PERIPHERAL ANGIOPATHY WITHOUT GANGRENE, WITHOUT LONG-TERM CURRENT USE OF INSULIN: ICD-10-CM

## 2022-05-16 DIAGNOSIS — Z00.00 HEALTHCARE MAINTENANCE: ICD-10-CM
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
## 2022-05-16 DIAGNOSIS — F41.8 DEPRESSION WITH ANXIETY: ICD-10-CM

## 2022-05-16 DIAGNOSIS — E53.8 LOW VITAMIN B12 LEVEL: ICD-10-CM

## 2022-05-16 DIAGNOSIS — J44.9 COPD MIXED TYPE: ICD-10-CM

## 2022-05-16 DIAGNOSIS — F17.200 SMOKER: ICD-10-CM

## 2022-05-16 DIAGNOSIS — I70.90 ATHEROSCLEROSIS: Primary | ICD-10-CM

## 2022-05-16 DIAGNOSIS — E78.2 MIXED HYPERLIPIDEMIA: ICD-10-CM

## 2022-05-16 DIAGNOSIS — Z87.891 PERSONAL HISTORY OF NICOTINE DEPENDENCE: ICD-10-CM

## 2022-05-16 DIAGNOSIS — Z86.010 HISTORY OF COLONIC POLYPS: ICD-10-CM

## 2022-05-16 DIAGNOSIS — I87.2 CHRONIC VENOUS INSUFFICIENCY: ICD-10-CM

## 2022-05-16 DIAGNOSIS — Z87.19 HISTORY OF MELENA: ICD-10-CM

## 2022-05-16 DIAGNOSIS — I10 ESSENTIAL HYPERTENSION: ICD-10-CM

## 2022-05-16 DIAGNOSIS — K21.00 GASTROESOPHAGEAL REFLUX DISEASE WITH ESOPHAGITIS WITHOUT HEMORRHAGE: ICD-10-CM

## 2022-05-16 PROBLEM — R79.89 LOW VITAMIN B12 LEVEL: Status: ACTIVE | Noted: 2022-01-01

## 2022-05-16 PROBLEM — K92.1 MELENA: Status: RESOLVED | Noted: 2022-03-16 | Resolved: 2022-05-16

## 2022-05-16 PROCEDURE — G0439 PPPS, SUBSEQ VISIT: HCPCS | Performed by: GENERAL PRACTICE

## 2022-05-16 PROCEDURE — 1170F FXNL STATUS ASSESSED: CPT | Performed by: GENERAL PRACTICE

## 2022-05-16 PROCEDURE — 1159F MED LIST DOCD IN RCRD: CPT | Performed by: GENERAL PRACTICE

## 2022-05-16 RX ORDER — PANTOPRAZOLE SODIUM 40 MG/1
40 TABLET, DELAYED RELEASE ORAL DAILY
Qty: 30 TABLET | Refills: 5 | Status: SHIPPED | OUTPATIENT
Start: 2022-05-16

## 2022-05-16 NOTE — PATIENT INSTRUCTIONS
"Critical care medicine: Principles of diagnosis and management in the adult (4th ed., pp. 4696-1196). Pardo.\"> Shahid's anesthesia (8th ed., pp. 232-250). Pardo.\">   Advance Directive    Advance directives are legal documents that allow you to make decisions about your health care and medical treatment in case you become unable to communicate for yourself. Advance directives let your wishes be known to family, friends, and health care providers.  Discussing and writing advance directives should happen over time rather than all at once. Advance directives can be changed and updated at any time. There are different types of advance directives, such as:  Medical power of .  Living will.  Do not resuscitate (DNR) order or do not attempt resuscitation (DNAR) order.  Health care proxy and medical power of   A health care proxy is also called a health care agent. This person is appointed to make medical decisions for you when you are unable to make decisions for yourself. Generally, people ask a trusted friend or family member to act as their proxy and represent their preferences. Make sure you have an agreement with your trusted person to act as your proxy. A proxy may have to make a medical decision on your behalf if your wishes are not known.  A medical power of , also called a durable power of  for health care, is a legal document that names your health care proxy. Depending on the laws in your state, the document may need to be:  Signed.  Notarized.  Dated.  Copied.  Witnessed.  Incorporated into your medical record.  You may also want to appoint a trusted person to manage your money in the event you are unable to do so. This is called a durable power of  for finances. It is a separate legal document from the durable power of  for health care. You may choose your health care proxy or someone different to act as your agent in money matters.  If you do not appoint a " proxy, or there is a concern that the proxy is not acting in your best interest, a court may appoint a guardian to act on your behalf.  Living will  A living will is a set of instructions that state your wishes about medical care when you cannot express them yourself. Health care providers should keep a copy of your living will in your medical record. You may want to give a copy to family members or friends. To alert caregivers in case of an emergency, you can place a card in your wallet to let them know that you have a living will and where they can find it. A living will is used if you become:  Terminally ill.  Disabled.  Unable to communicate or make decisions.  The following decisions should be included in your living will:  To use or not to use life support equipment, such as dialysis machines and breathing machines (ventilators).  Whether you want a DNR or DNAR order. This tells health care providers not to use cardiopulmonary resuscitation (CPR) if breathing or heartbeat stops.  To use or not to use tube feeding.  To be given or not to be given food and fluids.  Whether you want comfort (palliative) care when the goal becomes comfort rather than a cure.  Whether you want to donate your organs and tissues.  A living will does not give instructions for distributing your money and property if you should pass away.  DNR or DNAR  A DNR or DNAR order is a request not to have CPR in the event that your heart stops beating or you stop breathing. If a DNR or DNAR order has not been made and shared, a health care provider will try to help any patient whose heart has stopped or who has stopped breathing. If you plan to have surgery, talk with your health care provider about how your DNR or DNAR order will be followed if problems occur.  What if I do not have an advance directive?  Some states assign family decision makers to act on your behalf if you do not have an advance directive. Each state has its own laws about  advance directives. You may want to check with your health care provider, , or state representative about the laws in your state.  Summary  Advance directives are legal documents that allow you to make decisions about your health care and medical treatment in case you become unable to communicate for yourself.  The process of discussing and writing advance directives should happen over time. You can change and update advance directives at any time.  Advance directives may include a medical power of , a living will, and a DNR or DNAR order.  This information is not intended to replace advice given to you by your health care provider. Make sure you discuss any questions you have with your health care provider.  Document Revised: 09/21/2021 Document Reviewed: 09/21/2021  [employer] Patient Education © 2021 [employer]  Fall Prevention in the Home, Adult  Falls can cause injuries and can happen to people of all ages. There are many things you can do to make your home safe and to help prevent falls. Ask for help when making these changes.  What actions can I take to prevent falls?  General Instructions  Use good lighting in all rooms. Replace any light bulbs that burn out.  Turn on the lights in dark areas. Use night-lights.  Keep items that you use often in easy-to-reach places. Lower the shelves around your home if needed.  Set up your furniture so you have a clear path. Avoid moving your furniture around.  Do not have throw rugs or other things on the floor that can make you trip.  Avoid walking on wet floors.  If any of your floors are uneven, fix them.  Add color or contrast paint or tape to clearly ambika and help you see:  Grab bars or handrails.  First and last steps of staircases.  Where the edge of each step is.  If you use a stepladder:  Make sure that it is fully opened. Do not climb a closed stepladder.  Make sure the sides of the stepladder are locked in place.  Ask someone to hold the  stepladder while you use it.  Know where your pets are when moving through your home.  What can I do in the bathroom?         Keep the floor dry. Clean up any water on the floor right away.  Remove soap buildup in the tub or shower.  Use nonskid mats or decals on the floor of the tub or shower.  Attach bath mats securely with double-sided, nonslip rug tape.  If you need to sit down in the shower, use a plastic, nonslip stool.  Install grab bars by the toilet and in the tub and shower. Do not use towel bars as grab bars.  What can I do in the bedroom?  Make sure that you have a light by your bed that is easy to reach.  Do not use any sheets or blankets for your bed that hang to the floor.  Have a firm chair with side arms that you can use for support when you get dressed.  What can I do in the kitchen?  Clean up any spills right away.  If you need to reach something above you, use a step stool with a grab bar.  Keep electrical cords out of the way.  Do not use floor polish or wax that makes floors slippery.  What can I do with my stairs?  Do not leave any items on the stairs.  Make sure that you have a light switch at the top and the bottom of the stairs.  Make sure that there are handrails on both sides of the stairs. Fix handrails that are broken or loose.  Install nonslip stair treads on all your stairs.  Avoid having throw rugs at the top or bottom of the stairs.  Choose a carpet that does not hide the edge of the steps on the stairs.  Check carpeting to make sure that it is firmly attached to the stairs. Fix carpet that is loose or worn.  What can I do on the outside of my home?  Use bright outdoor lighting.  Fix the edges of walkways and driveways and fix any cracks.  Remove anything that might make you trip as you walk through a door, such as a raised step or threshold.  Trim any bushes or trees on paths to your home.  Check to see if handrails are loose or broken and that both sides of all steps have  handrails.  Install guardrails along the edges of any raised decks and porches.  Clear paths of anything that can make you trip, such as tools or rocks.  Have leaves, snow, or ice cleared regularly.  Use sand or salt on paths during winter.  Clean up any spills in your garage right away. This includes grease or oil spills.  What other actions can I take?  Wear shoes that:  Have a low heel. Do not wear high heels.  Have rubber bottoms.  Feel good on your feet and fit well.  Are closed at the toe. Do not wear open-toe sandals.  Use tools that help you move around if needed. These include:  Canes.  Walkers.  Scooters.  Crutches.  Review your medicines with your doctor. Some medicines can make you feel dizzy. This can increase your chance of falling.  Ask your doctor what else you can do to help prevent falls.  Where to find more information  Centers for Disease Control and Prevention, [place]: www.cdc.gov  [employer] [place] [employer] [place]: www.shira.nih.gov  Contact a doctor if:  You are afraid of falling at home.  You feel weak, drowsy, or dizzy at home.  You fall at home.  Summary  There are many simple things that you can do to make your home safe and to help prevent falls.  Ways to make your home safe include removing things that can make you trip and installing grab bars in the bathroom.  Ask for help when making these changes in your home.  This information is not intended to replace advice given to you by your health care provider. Make sure you discuss any questions you have with your health care provider.  Document Revised: 07/21/2021 Document Reviewed: 07/21/2021  Elsevier Patient Education © 2021 [employer]  Heart Disease Prevention  Heart disease is the leading cause of death in the world. Coronary artery disease is the most common cause of heart disease. This condition results when cholesterol and other substances (plaque) build up inside the walls of the blood vessels that supply your heart muscle (arteries).  This buildup in arteries is called atherosclerosis. You can take actions to lower your risk of heart disease.  How can heart disease affect me?  Heart disease can cause many unpleasant symptoms and complications, such as:  Chest pain (angina).  Reduced or blocked blood flow to your heart. This can cause:  Irregular heartbeats (arrhythmias).  Heart attack.  Heart failure.  What can increase my risk?  The following factors may make you more likely to develop this condition:  High blood pressure (hypertension).  High cholesterol.  Smoking.  A diet high in saturated fats or trans fats.  Lack of physical activity.  Obesity.  Drinking too much alcohol.  Diabetes.  Having a family history of heart disease.  What actions can I take to prevent heart disease?  Nutrition    Eat a heart-healthy eating plan as told by your health care provider. Examples include the DASH (Dietary Approaches to Stop Hypertension) eating plan or the Mediterranean diet.  Generally, it is recommended that you:  Eat less salt (sodium). Ask your health care provider how much sodium is safe for you. Most people should have less than 2,300 mg each day.  Limit unhealthy fats, such as saturated and trans fats, in your diet. You can do this by eating low-fat dairy products, eating less red meat, and avoiding processed foods.  Eat healthy fats (omega-3 fatty acids). These are found in fish, such as mackerel or salmon.  Eat more fruits and vegetables. You should try to fill one-half of your plate with fruits and vegetables at each meal.  Eat more whole grains.  Avoid foods and drinks that have added sugars.    Lifestyle    Get regular exercise. This is one of the most important things you can do for your health. Generally, it is recommended that you:  Exercise for at least 30 minutes on most days of the week (150 minutes each week). The exercise should increase your heart rate and make you sweat (aerobic exercise).  Add strength exercises on at least 2 days  each week.  Do not use any products that contain nicotine or tobacco, such as cigarettes and e-cigarettes. These can damage your heart and blood vessels. If you need help quitting, ask your health care provider.    Alcohol use  Do not drink alcohol if:  Your health care provider tells you not to drink.  You are pregnant, may be pregnant, or are planning to become pregnant.  If you drink alcohol, limit how much you have:  0-1 drink a day for women.  0-2 drinks a day for men.  Be aware of how much alcohol is in your drink. In the [place] one drink equals one typical bottle of beer (12 oz), one-half glass of wine (5 oz), or one shot of hard liquor (1½ oz).  Medicines  Take over-the-counter and prescription medicines only as told by your health care provider.  Ask your health care provider whether you should take an aspirin every day. Taking aspirin may help reduce your risk of heart disease and stroke.  Depending on your risk factors, your health care provider may prescribe medicines to lower your risk of heart disease or to control related conditions. You may take medicine to:  Lower cholesterol.  Control blood pressure.  Control diabetes.  General information  Keep your blood pressure under control, as recommended by your health care provider. For most healthy people, the upper number of your blood pressure (systolic) should be no higher than 120, and the lower number (diastolic) no higher than 80. Treatment may be needed if your blood pressure is higher than 130/80.  Have your blood pressure checked at least every two years. Your health care provider may check your blood pressure more often if you have high blood pressure.  After age 20, have your cholesterol checked every 4-6 years. If you have risk factors for heart disease, you may need to have it checked more frequently. Treatment may be needed if your cholesterol is high.  Have your body mass index (BMI) checked every year. Your health care provider can calculate  your BMI from your height and weight.  Work with your health care provider to lose weight, if needed, or to maintain a healthy weight.  Where to find more information:  Centers for Disease Control and Prevention: www.cdc.gov/heartdisease  American Heart Association: www.heart.org  Take a free online heart disease risk quiz to better understand your personal risk factors.  Summary  Heart disease is the leading cause of death in the world.  Heart disease can cause chest pain, abnormal heart rhythms, heart attack, and heart failure.  High blood pressure, high cholesterol, and smoking are the main risk factors for heart disease, although other factors also contribute.  You can take actions to lower your chances of developing heart disease. Work with your health care provider to reduce your risk by following a heart-healthy diet, being physically active, and controlling your weight, blood pressure, and cholesterol level.  This information is not intended to replace advice given to you by your health care provider. Make sure you discuss any questions you have with your health care provider.  Document Revised: 01/02/2019 Document Reviewed: 01/02/2019  [employer] Patient Education © 2021 [employer]  Mammogram  A mammogram is a low energy X-ray of the breasts that is done to check for abnormal changes. This procedure can screen for and detect any changes that may indicate breast cancer. Mammograms are regularly done on women. A man may have a mammogram if he has a lump or swelling in his breast. A mammogram can also identify other changes and variations in the breast, such as:  Inflammation of the breast tissue (mastitis).  An infected area that contains a collection of pus (abscess).  A fluid-filled sac (cyst).  Fibrocystic changes. This is when breast tissue becomes denser, which can make the tissue feel rope-like or uneven under the skin.  Tumors that are not cancerous (benign).  Tell a health care provider:  About any  allergies you have.  If you have breast implants.  If you have had previous breast disease, biopsy, or surgery.  If you are breastfeeding.  If you are younger than age 25.  If you have a family history of breast cancer.  Whether you are pregnant or may be pregnant.  What are the risks?  Generally, this is a safe procedure. However, problems may occur, including:  Exposure to radiation. Radiation levels are very low with this test.  The results being misinterpreted.  The need for further tests.  The inability of the mammogram to detect certain cancers.  What happens before the procedure?  Schedule your test about 1-2 weeks after your menstrual period if you are still menstruating. This is usually when your breasts are the least tender.  If you have had a mammogram done at a different facility in the past, get the mammogram X-rays or have them sent to your current exam facility. The new and old images will be compared.  Wash your breasts and underarms on the day of the test.  Do not wear deodorants, perfumes, lotions, or powders anywhere on your body on the day of the test.  Remove any jewelry from your neck.  Wear clothes that you can change into and out of easily.  What happens during the procedure?    You will undress from the waist up and put on a gown that opens in the front.  You will  front of the X-ray machine.  Each breast will be placed between two plastic or glass plates. The plates will compress your breast for a few seconds. Try to stay as relaxed as possible during the procedure. This does not cause any harm to your breasts and any discomfort you feel will be very brief.  X-rays will be taken from different angles of each breast.  The procedure may vary among health care providers and hospitals.  What happens after the procedure?  The mammogram will be examined by a specialist (radiologist).  You may need to repeat certain parts of the test, depending on the quality of the images. This is  "commonly done if the radiologist needs a better view of the breast tissue.  You may resume your normal activities.  It is up to you to get the results of your procedure. Ask your health care provider, or the department that is doing the procedure, when your results will be ready.  Summary  A mammogram is a low energy X-ray of the breasts that is done to check for abnormal changes. A man may have a mammogram if he has a lump or swelling in his breast.  If you have had a mammogram done at a different facility in the past, get the mammogram X-rays or have them sent to your current exam facility in order to compare them.  Schedule your test about 1-2 weeks after your menstrual period if you are still menstruating.  For this test, each breast will be placed between two plastic or glass plates. The plates will compress your breast for a few seconds.  Ask when your test results will be ready. Make sure you get your test results.  This information is not intended to replace advice given to you by your health care provider. Make sure you discuss any questions you have with your health care provider.  Document Revised: 08/08/2019 Document Reviewed: 08/08/2019  [employer] Patient Education © 2021 [employer]  https://www.cancer.org/cancer/colon-rectal-cancer/causes-risks-prevention/risk-factors.html\">   Colorectal Cancer Screening    Colorectal cancer screening is a group of tests that are used to check for colorectal cancer before symptoms develop. Colorectal refers to the colon and rectum. The colon and rectum are located at the end of the digestive tract and carry stool (feces) out of the body.  Who should have screening?  All adults who are 45-75 years old should have screening. Your health care provider may recommend screening before age 45. You will have tests every 1-10 years, depending on your results and the type of screening test. Screening recommendations for adults who are 76-85 years old vary depending on a " person's health. People older than age 85 should no longer get colorectal cancer screening.  You may have screening tests starting before age 45, or more often than other people, if you have any of these risk factors:  A personal or family history of colorectal cancer or abnormal growths known as polyps in your colon.  Inflammatory bowel disease, such as ulcerative colitis or Crohn's disease.  A history of having radiation treatment to the abdomen or the area between the hip bones (pelvic area) for cancer.  A type of genetic syndrome that is passed from parent to child (hereditary), such as:  Charlton syndrome.  Familial adenomatous polyposis.  Turcot syndrome.  Peutz-Jeghers syndrome.  MUTYH-associated polyposis (MAP).  A personal history of diabetes.  Types of tests  There are several types of colorectal screening tests. You may have one or more of the following:  Guaiac-based fecal occult blood testing. For this test, a stool sample is checked for hidden (occult) blood, which could be a sign of colorectal cancer.  Fecal immunochemical test (FIT). For this test, a stool sample is checked for blood, which could be a sign of colorectal cancer.  Stool DNA test. For this test, a stool sample is checked for blood and changes in DNA that could lead to colorectal cancer.  Sigmoidoscopy. During this test, a thin, flexible tube with a camera on the end, called a sigmoidoscope, is used to examine the rectum and the lower colon.  Colonoscopy. During this test, a long, flexible tube with a camera on the end, called a colonoscope, is used to examine the entire colon and rectum. Also, sometimes a tissue sample is taken to be looked at under a microscope (biopsy) or small polyps are removed during this test.  Virtual colonoscopy. Instead of a colonoscope, this type of colonoscopy uses a CT scan to take pictures of the colon and rectum. A CT scan is a type of X-ray that is made using computers.  What are the benefits of  screening?  Screening reduces your risk for colorectal cancer and can help identify cancer at an early stage, when the cancer can be removed or treated more easily. It is common for polyps to form in the lining of the colon, especially as you age. These polyps may be cancerous or become cancerous over time. Screening can identify these polyps.  What are the risks of screening?  Generally, these are safe tests. However, problems may occur, including:  The need for more tests to confirm results from a stool sample test. Stool sample tests have fewer risks than other types of screening tests.  Being exposed to low levels of radiation, if you had a test involving X-rays. This may slightly increase your cancer risk. The benefit of detecting cancer outweighs the slight increase in risk.  Bleeding, damage to the intestine, or infection caused by a sigmoidoscopy or colonoscopy.  A reaction to medicines given during a sigmoidoscopy or colonoscopy.  Talk with your health care provider to understand your risk for colorectal cancer and to make a screening plan that is right for you.  Questions to ask your health care provider  When should I start colorectal cancer screening?  What is my risk for colorectal cancer?  How often do I need screening?  Which screening tests do I need?  How do I get my test results?  What do my results mean?  Where to find more information  Learn more about colorectal cancer screening from:  The American Cancer Society: cancer.org  National Cancer [place]: cancer.gov  Summary  Colorectal cancer screening is a group of tests used to check for colorectal cancer before symptoms develop.  All adults who are 45-75 years old should have screening. Your health care provider may recommend screening before age 45.  You may have screening tests starting before age 45, or more often than other people, if you have certain risk factors.  Screening reduces your risk for colorectal cancer and can help identify  cancer at an early stage, when the cancer can be removed or treated more easily.  Talk with your health care provider to understand your risk for colorectal cancer and to make a screening plan that is right for you.  This information is not intended to replace advice given to you by your health care provider. Make sure you discuss any questions you have with your health care provider.  Document Revised: 04/07/2021 Document Reviewed: 04/07/2021  [employer] Patient Education © 2021 [employer]

## 2022-05-16 NOTE — PROGRESS NOTES
The ABCs of the Annual Wellness Visit  Subsequent Medicare Wellness Visit    Chief Complaint  Subsequent Medicare Wellness Visit    Subjective    History of Present Illness:  Mariluz Jenkins is a 76 y.o. female who presents for a Subsequent Medicare Wellness Visit.    Falls  She has apparently had two further falls since last here. These also occurred shortly after getting up from sitting and were associated with lightheadedness and generalized weakness.  She continues to deny any other warning symptoms and there is no history of any chest pain, palpitations, diaphoresis, or nausea.  She continues to deny any new headaches or visual disturbances and has had no unilateral weakness.    Type 2 Diabetes Mellitus  She gives an approximate 35-year history of type 2 diabetes mellitus.  She remains on metformin 1000 twice daily as well as ASA 81 daily.  She admits to bilateral visual blurring and numbness of both feet.  She continues to deny any polydipsia or polyuria and there is no history of any skin breakdown.  She has lost approximately 55 pounds over the last 5 years with a steady improvement in her glucose.  Duplex Doppler ultrasound of the carotid arteries performed on 10/3/2019 revealed mild to moderate plaque formation worse on the right.  No hemodynamically significant stenosis was noted.  CTA of the abdomen performed on 10/11/2019 was reported as showing atherosclerotic vascular calcifications.  No hemodynamically significant stenosis was noted.  Nuclear stress testing performed on 4/12/2021 revealed no evidence of inducible ischemia with a calculated EF of 65%     Hyperlipidemia  She remains on atorvastatin 20 daily with no apparent side effects     Essential Hypertension  She remains on losartan 100 daily, metoprolol 25 twice daily, and hydralazine 25 twice daily.  She admits to shortness of breath with exertion with intermittent lower extremity edema but continues to deny any orthopnea or PND and there is no  history of any chest pain, palpitations, or lightheadedness.  She admits to bilateral visual blurring and numbness of both feet but denies any other visual disturbances and there is no history of any weakness or difficulty talking.  She has long standing hearing loss but has no difficulty understanding what is said to her provided she can hear it    Depression With Anxiety  She gives a more than 5 year history of depressed mood, anhedonia, nervousness, decreased appetite with weight loss, and insomnia.  She dates the symptoms to the death of her  from colon cancer.  There is no history of any suicidal ideation.  She is currently maintained on paroxetine 30 daily, buspirone 5 twice daily, and trazodone 100 every evening    Endoscopy  She underwent an EGD and colonoscopy by Dr. Noel on 4/20/2022 with evidence of esophagitis and removal of 2 colonic polyps.  Pathology was consistent with marked chronic esophagitis and tubular adenomas    The following portions of the patient's history were reviewed and   updated as appropriate: allergies, current medications, past family history, past medical history, past social history, past surgical history and problem list.    Compared to one year ago, the patient feels her physical   health is the same.    Compared to one year ago, the patient feels her mental   health is the same.    Recent Hospitalizations:  She was not admitted to the hospital during the last year.     Current Medical Providers:  Patient Care Team:  John Middleton MD as PCP - General (Family Medicine)    Outpatient Medications Prior to Visit   Medication Sig Dispense Refill   • atorvastatin (LIPITOR) 40 MG tablet Take 1 tablet by mouth Every Night. 30 tablet 5   • busPIRone (BUSPAR) 5 MG tablet Take 1 tablet by mouth 2 (Two) Times a Day. 60 tablet 5   • hydrALAZINE (APRESOLINE) 25 MG tablet Take 1 tablet by mouth 2 (Two) Times a Day. Hold for BP <110/60 60 tablet 5   • losartan (COZAAR) 100 MG  tablet Take 1 tablet by mouth Every Night. Hold for BP <110/60 30 tablet 5   • meclizine (ANTIVERT) 12.5 MG tablet Take 12.5 mg by mouth 2 (Two) Times a Day.     • metoprolol succinate XL (TOPROL-XL) 50 MG 24 hr tablet Take 1 tablet by mouth Every Night. 30 tablet 5   • PARoxetine (PAXIL) 30 MG tablet Take 1 tablet by mouth Every Morning. 30 tablet 5   • traZODone (DESYREL) 100 MG tablet Take 1 tablet by mouth Every Night. 30 tablet 5   • vitamin B-12 (CYANOCOBALAMIN) 1000 MCG tablet Take 1 tablet by mouth Daily. 30 tablet 5   • vitamin D (ERGOCALCIFEROL) 1.25 MG (68393 UT) capsule capsule Take 1 capsule by mouth 1 (One) Time Per Week. 4 capsule 5   • aspirin 81 MG EC tablet Take 1 tablet by mouth Daily. 30 tablet 1   • metFORMIN (GLUCOPHAGE) 1000 MG tablet Take 1 tablet by mouth Daily With Breakfast. 30 tablet 5     No facility-administered medications prior to visit.     No opioid medication identified on active medication list. I have reviewed chart for other potential  high risk medication/s and harmful drug interactions in the elderly.          Aspirin is on active medication list. Aspirin use is not indicated based on review of current medical condition/s. Risk of harm outweighs potential benefits. Patient instructed to discontinue this medication.  .    Patient Active Problem List   Diagnosis   • Atherosclerosis   • Type 2 diabetes mellitus with diabetic peripheral angiopathy without gangrene, without long-term current use of insulin ([facility])   • Healthcare maintenance   • Mixed hyperlipidemia   • Essential hypertension   • Depression with anxiety   • Smoker   • COPD mixed type (HCC)   • Chronic venous insufficiency   • Falls   • Vitamin D deficiency   • Low vitamin B12 level   • Gastroesophageal reflux disease with esophagitis without hemorrhage     Advance Care Planning  Advance Directive is not on file.  ACP discussion was held with the patient during this visit. Patient does not have an advance directive,  "information provided.    Review of Systems   Constitutional: Positive for fatigue. Negative for appetite change, chills, diaphoresis and fever.   HENT: Positive for hearing loss. Negative for congestion, ear pain, postnasal drip, rhinorrhea, sinus pressure, sneezing, sore throat, tinnitus and voice change.    Eyes: Negative for visual disturbance.   Respiratory: Positive for shortness of breath. Negative for cough and wheezing.    Cardiovascular: Positive for leg swelling. Negative for chest pain and palpitations.   Gastrointestinal: Positive for constipation and nausea. Negative for abdominal pain, blood in stool, diarrhea and vomiting.   Endocrine: Negative for polydipsia and polyuria.   Genitourinary: Negative for dysuria, frequency, hematuria and urgency.   Musculoskeletal: Negative for arthralgias, back pain, joint swelling and myalgias.   Skin: Negative for rash.   Neurological: Positive for weakness (generalized) and numbness (fett). Negative for confusion.   Psychiatric/Behavioral: Positive for decreased concentration, dysphoric mood and sleep disturbance. Negative for suicidal ideas. The patient is nervous/anxious.         Objective    Vitals:    05/16/22 1540   BP: 168/64   Pulse: 69   Resp: 15   Temp: 98.6 °F (37 °C)   TempSrc: Temporal   SpO2: 97%   Weight: 49.4 kg (109 lb)   Height: 167.3 cm (65.85\")     BMI Readings from Last 1 Encounters:   05/16/22 17.67 kg/m²   BMI is below normal parameters. Recommendations include: treating the underlying disease process    Does the patient have evidence of cognitive impairment? No    Physical Exam  Constitutional:       General: She is not in acute distress.     Appearance: Normal appearance. She is well-developed. She is not diaphoretic.      Comments: Accompanied by her son.  Alert and oriented.  Looks somewhat depressed.  No apparent distress.  Ambulating with a walker.  Able to climb onto the exam table with 2 person assistance.  Marked hearing loss.  No " pallor, jaundice, diaphoresis, or cyanosis.     HENT:      Head: Atraumatic.      Right Ear: Decreased hearing noted.   Eyes:      Conjunctiva/sclera: Conjunctivae normal.   Neck:      Thyroid: No thyroid mass or thyromegaly.      Vascular: Carotid bruit (right) present. No JVD.      Trachea: Trachea normal. No tracheal deviation.   Cardiovascular:      Rate and Rhythm: Normal rate and regular rhythm.      Heart sounds: Normal heart sounds, S1 normal and S2 normal. No murmur heard.    No gallop.   Pulmonary:      Effort: Pulmonary effort is normal.      Breath sounds: Examination of the right-lower field reveals decreased breath sounds. Examination of the left-lower field reveals decreased breath sounds. Decreased breath sounds and wheezing (diffuse - moderate) present. No rales.   Chest:   Breasts:      Right: No supraclavicular adenopathy.      Left: No supraclavicular adenopathy.       Abdominal:      General: Bowel sounds are normal. There is no distension or abdominal bruit.      Palpations: Abdomen is soft. There is no hepatomegaly, splenomegaly or mass.      Tenderness: There is no abdominal tenderness.      Hernia: No hernia is present.   Musculoskeletal:      Right lower le+ Edema present.      Left lower le+ Edema present.   Lymphadenopathy:      Head:      Right side of head: No submental, submandibular, tonsillar, preauricular, posterior auricular or occipital adenopathy.      Left side of head: No submental, submandibular, tonsillar, preauricular, posterior auricular or occipital adenopathy.      Cervical: No cervical adenopathy.      Upper Body:      Right upper body: No supraclavicular adenopathy.      Left upper body: No supraclavicular adenopathy.   Skin:     General: Skin is warm.      Coloration: Skin is not cyanotic, jaundiced or pale.      Findings: No rash.      Nails: There is no clubbing.   Neurological:      Mental Status: She is alert and oriented to person, place, and time.       Cranial Nerves: No cranial nerve deficit.      Sensory: Sensory deficit (decreased vibration sense toes of both feet) present.      Motor: No tremor.      Coordination: Coordination normal.      Gait: Gait normal.   Psychiatric:         Attention and Perception: Attention normal.         Mood and Affect: Mood is depressed (somewhat).         Speech: Speech normal.         Behavior: Behavior normal.         Thought Content: Thought content normal.       HEALTH RISK ASSESSMENT    Smoking Status:  Social History     Tobacco Use   Smoking Status Current Every Day Smoker   • Packs/day: 0.50   • Years: 50.00   • Pack years: 25.00   • Types: Cigarettes   Smokeless Tobacco Never Used     Alcohol Consumption:  Social History     Substance and Sexual Activity   Alcohol Use No     Fall Risk Screen:    STEADI Fall Risk Assessment was completed, and patient is at MODERATE risk for falls. Assessment completed on:5/16/2022    Depression Screening:  PHQ-2/PHQ-9 Depression Screening 5/16/2022   Retired PHQ-9 Total Score -   Retired Total Score -   Little Interest or Pleasure in Doing Things 0-->not at all   Feeling Down, Depressed or Hopeless 0-->not at all   PHQ-9: Brief Depression Severity Measure Score 0       Health Habits and Functional and Cognitive Screening:  Functional & Cognitive Status 5/16/2022   Do you have difficulty preparing food and eating? No   Do you have difficulty bathing yourself, getting dressed or grooming yourself? No   Do you have difficulty using the toilet? No   Do you have difficulty moving around from place to place? No   Do you have trouble with steps or getting out of a bed or a chair? No   Current Diet Well Balanced Diet   Dental Exam Not up to date   Eye Exam Not up to date   Exercise (times per week) 0 times per week   Current Exercises Include No Regular Exercise   Do you need help using the phone?  No   Are you deaf or do you have serious difficulty hearing?  No   Do you need help with  transportation? No   Do you need help shopping? No   Do you need help preparing meals?  No   Do you need help with housework?  No   Do you need help with laundry? No   Do you need help taking your medications? No   Do you need help managing money? No   Do you ever drive or ride in a car without wearing a seat belt? No   Have you felt unusual stress, anger or loneliness in the last month? No   Who do you live with? Other   If you need help, do you have trouble finding someone available to you? No   Have you been bothered in the last four weeks by sexual problems? No   Do you have difficulty concentrating, remembering or making decisions? No       Age-appropriate Screening Schedule:  Refer to the list below for future screening recommendations based on patient's age, sex and/or medical conditions. Orders for these recommended tests are listed in the plan section. The patient has been provided with a written plan.    Health Maintenance   Topic Date Due   • URINE MICROALBUMIN  Never done   • DXA SCAN  Never done   • ZOSTER VACCINE (1 of 2) Never done   • TDAP/TD VACCINES (2 - Tdap) 01/01/2010   • MAMMOGRAM  04/20/2017   • DIABETIC FOOT EXAM  Never done   • DIABETIC EYE EXAM  Never done   • HEMOGLOBIN A1C  05/11/2022   • INFLUENZA VACCINE  08/01/2022   • LIPID PANEL  11/11/2022              Assessment & Plan   CMS Preventative Services Quick Reference  Risk Factors Identified During Encounter  Cardiovascular Disease  Depression/Dysphoria  Fall Risk-High or Moderate  Immunizations Discussed/Encouraged (specific Immunizations; Tdap, Influenza, Shingrix and COVID19  Tobacco Use/Dependance (use dotphrase .tobaccocessation for documentation)  The above risks/problems have been discussed with the patient.  Follow up actions/plans if indicated are seen below in the Assessment/Plan Section.  Pertinent information has been shared with the patient in the After Visit Summary.    Diagnoses and all orders for this visit:    1.  Atherosclerosis (Primary)  Reminded regarding risk factor modification with an emphasis on tobacco cessation.  -     CBC & Differential; Future    2. Chronic venous insufficiency    3. Essential hypertension   Hypertension: poorly controlled. Evidence of target organ damage: evidence of atherosclerosis on previous imaging.  Continue current medication for now  We will likely titrate hydralazine at her return  -     CBC & Differential; Future  -     Comprehensive Metabolic Panel; Future    4. Mixed hyperlipidemia  Continue current medication.  -     Comprehensive Metabolic Panel; Future  -     Lipid Panel; Future    5. Type 2 diabetes mellitus with diabetic peripheral angiopathy without gangrene, without long-term current use of insulin (HCC)  Diabetes mellitus Type II, under excellent control.   Metformin will be discontinued as it is likely contributing to her weight loss  Scheduled for updated labs  -     Comprehensive Metabolic Panel; Future  -     Hemoglobin A1c; Future    6. History of melena  Resolved    7. Healthcare maintenance  Patient uninterested in a mammogram or DEXA scan but agrees to rescheduling of her low-dose CT  -      CT Chest Low Dose Cancer Screening WO; Future    8. Depression with anxiety  Significant situational component.   Supportive therapy.   Continue current medication.    9. Falls  Lengthy discussion again regarding fall avoidance    10. COPD mixed type (HCC)   COPD is stable.  Reminded of the importance of smoking cessation  Encouraged to remain as active as symptoms allow for  -     CBC & Differential; Future  -      CT Chest Low Dose Cancer Screening WO; Future    11. Smoker  Lengthy discussion regarding the potential sequela of continued tobacco use and the options with respect to cessation.  Patient uninterested in pursuing at present but will consider.  -      CT Chest Low Dose Cancer Screening WO; Future    12. Vitamin D deficiency  Continue supplementation with monitoring.  -      Vitamin D 25 Hydroxy; Future    13. Low vitamin B12 level  As above.  -     Vitamin B12; Future    14. Gastroesophageal reflux disease with esophagitis without hemorrhage  Will discontinue ASA and start on pantoprazole for now  -     pantoprazole (PROTONIX) 40 MG EC tablet; Take 1 tablet by mouth Daily.  Dispense: 30 tablet; Refill: 5    15.  History of colonic polyps  We will consider an updated endoscopy in 5 years depending on her overall health    Follow Up:   Return in about 12 weeks (around 8/8/2022).     An After Visit Summary and PPPS were made available to the patient.

## 2022-05-27 NOTE — TELEPHONE ENCOUNTER
Caller: LELA ABARCA    Relationship: Emergency Contact    Best call back number: [phone]     What medications are you currently taking:      traZODone (DESYREL) 100 MG tablet    When did you start taking these medications:    Which medication are you concerned about: traZODone (DESYREL) 100 MG tablet    Who prescribed you this medication: DR ALEJANDRA    What are your concerns: FAMILY FEELS IT IS TIME TO TAKE PATIENT OFF THE TRAZODONE, SHE WANTS TO SLEEP A LOT, LIGHTHEADED AND DIZZY WITH AN INCREASE LOSS OF BALANCE WITH SOME FALLS,   WITHIN AN HOUR OF TAKING THE MEDICATION SHE IS CONFUSED, HAS EXTENDED GROGGY SENSATION.        How long have you had these concerns:      PLEASE CALL AND ADVISE ON HOW TO TRANSITION OFF THE MEDICATION.

## 2022-06-17 ENCOUNTER — APPOINTMENT (OUTPATIENT)
Dept: CT IMAGING | Facility: HOSPITAL | Age: 77
End: 2022-06-17

## 2022-06-24 DIAGNOSIS — E78.2 MIXED HYPERLIPIDEMIA: ICD-10-CM

## 2022-06-24 RX ORDER — ERGOCALCIFEROL 1.25 MG/1
50000 CAPSULE ORAL WEEKLY
Qty: 4 CAPSULE | Refills: 5 | Status: SHIPPED | OUTPATIENT
Start: 2022-06-24

## 2022-06-24 RX ORDER — ATORVASTATIN CALCIUM 40 MG/1
40 TABLET, FILM COATED ORAL NIGHTLY
Qty: 30 TABLET | Refills: 5 | Status: SHIPPED | OUTPATIENT
Start: 2022-06-24 | End: 2022-08-25 | Stop reason: SDUPTHER

## 2022-07-01 DIAGNOSIS — I10 ESSENTIAL HYPERTENSION: ICD-10-CM

## 2022-07-01 RX ORDER — LOSARTAN POTASSIUM 100 MG/1
TABLET ORAL
Qty: 30 TABLET | Refills: 5 | Status: SHIPPED | OUTPATIENT
Start: 2022-07-01 | End: 2022-08-25 | Stop reason: SDUPTHER

## 2022-07-05 DIAGNOSIS — F41.8 DEPRESSION WITH ANXIETY: ICD-10-CM

## 2022-07-05 RX ORDER — PAROXETINE 30 MG/1
TABLET, FILM COATED ORAL
Qty: 30 TABLET | Refills: 5 | Status: SHIPPED | OUTPATIENT
Start: 2022-07-05

## 2022-08-22 DIAGNOSIS — I10 ESSENTIAL HYPERTENSION: ICD-10-CM

## 2022-08-22 RX ORDER — METOPROLOL SUCCINATE 50 MG/1
TABLET, EXTENDED RELEASE ORAL
Qty: 30 TABLET | Refills: 5 | Status: SHIPPED | OUTPATIENT
Start: 2022-08-22

## 2022-08-24 DIAGNOSIS — E78.2 MIXED HYPERLIPIDEMIA: ICD-10-CM

## 2022-08-24 DIAGNOSIS — I10 ESSENTIAL HYPERTENSION: ICD-10-CM

## 2022-08-24 DIAGNOSIS — F41.8 DEPRESSION WITH ANXIETY: ICD-10-CM

## 2022-08-25 RX ORDER — ATORVASTATIN CALCIUM 40 MG/1
TABLET, FILM COATED ORAL
Qty: 30 TABLET | Refills: 0 | Status: SHIPPED | OUTPATIENT
Start: 2022-08-25

## 2022-08-25 RX ORDER — TRAZODONE HYDROCHLORIDE 100 MG/1
TABLET ORAL
Qty: 30 TABLET | Refills: 0 | Status: SHIPPED | OUTPATIENT
Start: 2022-08-25

## 2022-08-25 RX ORDER — LOSARTAN POTASSIUM 100 MG/1
TABLET ORAL
Qty: 30 TABLET | Refills: 0 | Status: SHIPPED | OUTPATIENT
Start: 2022-08-25

## 2022-09-18 DIAGNOSIS — I10 ESSENTIAL HYPERTENSION: ICD-10-CM

## 2022-09-19 RX ORDER — HYDRALAZINE HYDROCHLORIDE 25 MG/1
TABLET, FILM COATED ORAL
Qty: 60 TABLET | Refills: 0 | Status: SHIPPED | OUTPATIENT
Start: 2022-09-19

## 2022-10-11 ENCOUNTER — OFFICE VISIT (OUTPATIENT)
Dept: FAMILY MEDICINE CLINIC | Facility: CLINIC | Age: 77
End: 2022-10-11

## 2022-10-11 DIAGNOSIS — Z23 ENCOUNTER FOR IMMUNIZATION: ICD-10-CM

## 2022-10-11 DIAGNOSIS — J44.9 COPD MIXED TYPE: ICD-10-CM

## 2022-10-11 DIAGNOSIS — E55.9 VITAMIN D DEFICIENCY: ICD-10-CM

## 2022-10-11 DIAGNOSIS — I70.90 ATHEROSCLEROSIS: Primary | ICD-10-CM

## 2022-10-11 DIAGNOSIS — Z86.010 HISTORY OF COLONIC POLYPS: ICD-10-CM

## 2022-10-11 DIAGNOSIS — F41.8 DEPRESSION WITH ANXIETY: ICD-10-CM

## 2022-10-11 DIAGNOSIS — E53.8 LOW VITAMIN B12 LEVEL: ICD-10-CM

## 2022-10-11 DIAGNOSIS — E78.2 MIXED HYPERLIPIDEMIA: ICD-10-CM

## 2022-10-11 DIAGNOSIS — K21.00 GASTROESOPHAGEAL REFLUX DISEASE WITH ESOPHAGITIS WITHOUT HEMORRHAGE: ICD-10-CM

## 2022-10-11 DIAGNOSIS — K59.09 CHRONIC CONSTIPATION: ICD-10-CM

## 2022-10-11 DIAGNOSIS — Z00.00 HEALTHCARE MAINTENANCE: ICD-10-CM

## 2022-10-11 DIAGNOSIS — F17.200 SMOKER: ICD-10-CM

## 2022-10-11 DIAGNOSIS — E11.51 TYPE 2 DIABETES MELLITUS WITH DIABETIC PERIPHERAL ANGIOPATHY WITHOUT GANGRENE, WITHOUT LONG-TERM CURRENT USE OF INSULIN: ICD-10-CM

## 2022-10-11 DIAGNOSIS — R06.02 SHORTNESS OF BREATH: ICD-10-CM

## 2022-10-11 DIAGNOSIS — I87.2 CHRONIC VENOUS INSUFFICIENCY: ICD-10-CM

## 2022-10-11 DIAGNOSIS — I10 ESSENTIAL HYPERTENSION: ICD-10-CM

## 2022-10-11 PROCEDURE — 99214 OFFICE O/P EST MOD 30 MIN: CPT | Performed by: GENERAL PRACTICE

## 2022-10-11 NOTE — PROGRESS NOTES
Subjective   Mariluz Jenkins is a 77 y.o. female.     You have chosen to receive care through a telehealth visit.  Do you consent to use a video/audio connection for your medical care today? Yes    I did not complete this video visit with the patient using [employer] but rather [facility].  Patient was at home and I was at the office    Chief Complaint  She returns for a scheduled reassessment of multiple medical problems including type 2 diabetes mellitus, hyperlipidemia, essential hypertension, and depression with anxiety    History of Present Illness     Type 2 Diabetes Mellitus  She gives an approximate 35-year history of type 2 diabetes mellitus.  She is on no medication for this at present.  She admits to bilateral visual blurring and numbness of both feet.  She continues to deny any polydipsia or polyuria and there is no history of any skin breakdown. Duplex Doppler ultrasound of the carotid arteries performed on 10/3/2019 revealed mild to moderate plaque formation worse on the right.  No hemodynamically significant stenosis was noted.  CTA of the abdomen performed on 10/11/2019 was reported as showing atherosclerotic vascular calcifications.  No hemodynamically significant stenosis was noted.  Nuclear stress testing performed on 4/12/2021 revealed no evidence of inducible ischemia with a calculated EF of 65%.  She has yet to follow-up with the labs arranged at her last visit     Hyperlipidemia  She remains on atorvastatin 20 daily with no apparent side effects     Essential Hypertension  She remains on losartan 100 daily, metoprolol succinate 50 daily, and hydralazine 25 twice daily.  She admits to an increase in her shortness of breath with exertion and lower extremity edema.  There is no history of any orthopnea or PND and she continues to deny any chest pain, palpitations, or lightheadedness.  She admits to bilateral visual blurring and numbness of both feet but continues to deny any other visual disturbances  and there is no history of any weakness or difficulty talking.  She has long standing hearing loss but has no difficulty understanding what is said to her provided she can hear it    Depression With Anxiety  She gives a more than 5 year history of depressed mood, anhedonia, nervousness, decreased appetite with weight loss, and insomnia.  She dates the symptoms to the death of her  from colon cancer.  There is no history of any suicidal ideation.  She is currently maintained on paroxetine 30 daily, buspirone 5 twice daily, and trazodone 100 every evening    Falls  She denies any recent falls.    Constipation  She has experienced increased constipation since last here.  She continues to eat poorly but denies any nausea, vomiting, abdominal pain, hematochezia, or melena. She underwent an EGD and colonoscopy by Dr. Noel on 4/20/2022 with evidence of esophagitis and removal of 2 colonic polyps.  Pathology was consistent with marked chronic esophagitis and tubular adenomas    The following portions of the patient's history were reviewed and updated as appropriate: allergies, current medications, past medical history, past social history and problem list.    Review of Systems   Constitutional: Positive for fatigue. Negative for appetite change, chills, fever and unexpected weight change.   HENT: Positive for hearing loss. Negative for congestion, ear pain, rhinorrhea, sneezing, sore throat and voice change.    Eyes: Negative for visual disturbance.   Respiratory: Positive for shortness of breath. Negative for cough and wheezing.    Cardiovascular: Positive for leg swelling. Negative for chest pain and palpitations.   Gastrointestinal: Positive for constipation and nausea. Negative for abdominal pain, blood in stool, diarrhea and vomiting.   Endocrine: Negative for polydipsia.   Genitourinary: Negative for difficulty urinating, dysuria, frequency, hematuria, menstrual problem, pelvic pain, urgency, vaginal bleeding  and vaginal discharge.   Musculoskeletal: Negative for arthralgias, back pain, joint swelling, myalgias and neck pain.   Skin: Negative for color change.   Neurological: Positive for weakness (generalized) and numbness (feet). Negative for tremors and headaches.   Psychiatric/Behavioral: Positive for decreased concentration, dysphoric mood and sleep disturbance. Negative for suicidal ideas. The patient is nervous/anxious.      Objective   Physical Exam  Constitutional:       Comments: Accompanied by her son.  Sitting in a chair in no apparent distress.  Alert, and oriented with no pallor, cyanosis, diaphoresis, or jaundice   HENT:      Head:      Comments: Somewhat hard of hearing  Pulmonary:      Comments: Normal respiratory effort.  No cough or audible wheezing  Skin:     Coloration: Skin is not cyanotic, jaundiced or pale.   Neurological:      Mental Status: She is alert and oriented to person, place, and time.      Cranial Nerves: No cranial nerve deficit.   Psychiatric:         Attention and Perception: Attention normal.         Mood and Affect: Mood normal.         Speech: Speech normal.         Behavior: Behavior normal.         Thought Content: Thought content normal.       Assessment & Plan   Problems Addressed this Visit        Cardiac and Vasculature    Atherosclerosis   Reminded regarding the importance of risk factor modification.    Relevant Orders    CBC & Differential    Comprehensive Metabolic Panel    Chronic venous insufficiency  Lengthy discussion regarding lifestyle modification  Prescription emailed for graded compression hose    Essential hypertension  Encouraged to continue to work on her diet and exercise plan.  Continue current medication    Relevant Orders    CBC & Differential    Comprehensive Metabolic Panel    TSH    Mixed hyperlipidemia  As above.   Continue current medication.    Relevant Orders    Comprehensive Metabolic Panel    Lipid Panel    TSH       Endocrine and Metabolic     Low vitamin B12 level    Relevant Orders    CBC & Differential    Vitamin B12    Type 2 diabetes mellitus with diabetic peripheral angiopathy without gangrene, without long-term current use of insulin (HCC)  As above.  Scheduled again for updated labs    Relevant Orders    Comprehensive Metabolic Panel    TSH    Hemoglobin A1c    Vitamin B12    MicroAlbumin, Urine, Random - Urine, Clean Catch    Vitamin D deficiency    Relevant Orders    Vitamin D 25 Hydroxy       Gastrointestinal Abdominal     Chronic constipation  Reviewed options and agreed on a trial of linaclotide  Encouraged to report if any worse, any new symptoms, or if no better over the next week or 2    Relevant Medications    linaclotide (LINZESS) 145 MCG capsule capsule    Other Relevant Orders    CBC & Differential    Comprehensive Metabolic Panel    History of colonic polyps    Relevant Orders    CBC & Differential       Health Encounters    Healthcare maintenance  Recommended a flu shot and Prevnar 20 when she comes in for her labs  Recommended an updated bivalent COVID-19 vaccination.    Relevant Orders    Fluzone High-Dose 65+yrs    Pneumococcal Conjugate Vaccine 20-Valent All       Mental Health    Depression with anxiety  Stable.  Supportive therapy.   Continue current medication.    Relevant Orders    TSH       Pulmonary and Pneumonias    COPD mixed type (HCC)   COPD is worsening.  Reminded of the importance of smoking cessation  Encouraged to remain as active as symptoms allow for    Relevant Orders    CBC & Differential    proBNP       Tobacco    Smoker          Diagnoses       Codes Comments    Atherosclerosis    -  Primary ICD-10-CM: I70.90  ICD-9-CM: 440.9     Chronic venous insufficiency     ICD-10-CM: I87.2  ICD-9-CM: 459.81     Essential hypertension     ICD-10-CM: I10  ICD-9-CM: 401.9     Mixed hyperlipidemia     ICD-10-CM: E78.2  ICD-9-CM: 272.2     Low vitamin B12 level     ICD-10-CM: E53.8  ICD-9-CM: 266.2     Type 2 diabetes  mellitus with diabetic peripheral angiopathy without gangrene, without long-term current use of insulin (HCC)     ICD-10-CM: E11.51  ICD-9-CM: 250.70, 443.81     Vitamin D deficiency     ICD-10-CM: E55.9  ICD-9-CM: 268.9     History of colonic polyps     ICD-10-CM: Z86.010  ICD-9-CM: V12.72     Healthcare maintenance     ICD-10-CM: Z00.00  ICD-9-CM: V70.0     Depression with anxiety     ICD-10-CM: F41.8  ICD-9-CM: 300.4     COPD mixed type (HCC)     ICD-10-CM: J44.9  ICD-9-CM: 496     Smoker     ICD-10-CM: F17.200  ICD-9-CM: 305.1     Gastroesophageal reflux disease with esophagitis without hemorrhage     ICD-10-CM: K21.00  ICD-9-CM: 530.81, 530.10     Chronic constipation     ICD-10-CM: K59.09  ICD-9-CM: 564.00     Encounter for immunization     ICD-10-CM: Z23  ICD-9-CM: V03.89     Shortness of breath     ICD-10-CM: R06.02  ICD-9-CM: 786.05

## (undated) DEVICE — Device: Brand: DEFENDO AIR/WATER/SUCTION AND BIOPSY VALVE

## (undated) DEVICE — ENDOGATOR TUBING FOR ENDOGATOR EGP-100 IRRIGATION PUMP,OLYMPUS OFP PUMP, OLYMPUS AFU-100 PUMP AND ERBE EIP2 PUMP: Brand: ENDOGATOR

## (undated) DEVICE — CONN Y IRR DISP 1P/U

## (undated) DEVICE — SYR LUERLOK 30CC

## (undated) DEVICE — FRCP BX RADJAW4 NDL 2.8 240CM LG OG BX40

## (undated) DEVICE — SINGLE PORT MANIFOLD: Brand: NEPTUNE 2

## (undated) DEVICE — SNAR POLYP CAPTIFLX MICRO OVL 13MM 240CM

## (undated) DEVICE — THE BITE BLOCK MAXI, LATEX FREE STRAP IS USED TO PROTECT THE ENDOSCOPE INSERTION TUBE FROM BEING BITTEN BY THE PATIENT.

## (undated) DEVICE — POLYP TRAP: Brand: TRAPEASE®

## (undated) DEVICE — Device

## (undated) DEVICE — ENDOGATOR AUXILIARY WATER JET CONNECTOR: Brand: ENDOGATOR